# Patient Record
Sex: FEMALE | Race: WHITE | NOT HISPANIC OR LATINO | Employment: UNEMPLOYED | URBAN - METROPOLITAN AREA
[De-identification: names, ages, dates, MRNs, and addresses within clinical notes are randomized per-mention and may not be internally consistent; named-entity substitution may affect disease eponyms.]

---

## 2015-11-02 LAB — CRC RECOMMENDATION EXT: NORMAL

## 2018-10-17 LAB — HUMAN PAPILLOMAVIRUS (HPV): NORMAL

## 2019-10-22 LAB — PAP RECOMMENDATION EXT: NORMAL

## 2021-12-08 LAB — BCS RECOMMENDATION EXT: NORMAL

## 2022-01-24 LAB — HCV IGG SERPL QL IA: NON REACTIVE

## 2022-05-10 LAB
LEFT EYE DM RETINOPATHY: NEGATIVE
RIGHT EYE DM RETINOPATHY: NEGATIVE

## 2022-06-02 LAB
CREATININE URINE: 103 (ref 20–275)
MICROALB/CREAT RATIO: 4
MICROALBUM.,U,RANDOM: 0.4 UG/ML

## 2022-08-01 ENCOUNTER — OFFICE VISIT (OUTPATIENT)
Dept: PRIMARY CARE CLINIC | Facility: CLINIC | Age: 57
End: 2022-08-01
Payer: COMMERCIAL

## 2022-08-01 VITALS
BODY MASS INDEX: 37.6 KG/M2 | TEMPERATURE: 97 F | WEIGHT: 220.25 LBS | HEIGHT: 64 IN | DIASTOLIC BLOOD PRESSURE: 82 MMHG | SYSTOLIC BLOOD PRESSURE: 138 MMHG | HEART RATE: 61 BPM

## 2022-08-01 DIAGNOSIS — I10 HYPERTENSION, UNSPECIFIED TYPE: ICD-10-CM

## 2022-08-01 DIAGNOSIS — Z79.4 TYPE 2 DIABETES MELLITUS WITH OTHER SPECIFIED COMPLICATION, WITH LONG-TERM CURRENT USE OF INSULIN: ICD-10-CM

## 2022-08-01 DIAGNOSIS — M79.7 FIBROMYALGIA: ICD-10-CM

## 2022-08-01 DIAGNOSIS — E78.5 HYPERLIPIDEMIA, UNSPECIFIED HYPERLIPIDEMIA TYPE: ICD-10-CM

## 2022-08-01 DIAGNOSIS — Z00.00 ANNUAL PHYSICAL EXAM: Primary | ICD-10-CM

## 2022-08-01 DIAGNOSIS — Z76.89 ENCOUNTER TO ESTABLISH CARE: ICD-10-CM

## 2022-08-01 DIAGNOSIS — E11.69 TYPE 2 DIABETES MELLITUS WITH OTHER SPECIFIED COMPLICATION, WITH LONG-TERM CURRENT USE OF INSULIN: ICD-10-CM

## 2022-08-01 PROCEDURE — 4010F PR ACE/ARB THEARPY RXD/TAKEN: ICD-10-PCS | Mod: CPTII,S$GLB,, | Performed by: FAMILY MEDICINE

## 2022-08-01 PROCEDURE — 1159F MED LIST DOCD IN RCRD: CPT | Mod: CPTII,S$GLB,, | Performed by: FAMILY MEDICINE

## 2022-08-01 PROCEDURE — 99386 PREV VISIT NEW AGE 40-64: CPT | Mod: S$GLB,,, | Performed by: FAMILY MEDICINE

## 2022-08-01 PROCEDURE — 1159F PR MEDICATION LIST DOCUMENTED IN MEDICAL RECORD: ICD-10-PCS | Mod: CPTII,S$GLB,, | Performed by: FAMILY MEDICINE

## 2022-08-01 PROCEDURE — 3008F PR BODY MASS INDEX (BMI) DOCUMENTED: ICD-10-PCS | Mod: CPTII,S$GLB,, | Performed by: FAMILY MEDICINE

## 2022-08-01 PROCEDURE — 4010F ACE/ARB THERAPY RXD/TAKEN: CPT | Mod: CPTII,S$GLB,, | Performed by: FAMILY MEDICINE

## 2022-08-01 PROCEDURE — 3008F BODY MASS INDEX DOCD: CPT | Mod: CPTII,S$GLB,, | Performed by: FAMILY MEDICINE

## 2022-08-01 PROCEDURE — 3075F PR MOST RECENT SYSTOLIC BLOOD PRESS GE 130-139MM HG: ICD-10-PCS | Mod: CPTII,S$GLB,, | Performed by: FAMILY MEDICINE

## 2022-08-01 PROCEDURE — 1160F RVW MEDS BY RX/DR IN RCRD: CPT | Mod: CPTII,S$GLB,, | Performed by: FAMILY MEDICINE

## 2022-08-01 PROCEDURE — 3079F PR MOST RECENT DIASTOLIC BLOOD PRESSURE 80-89 MM HG: ICD-10-PCS | Mod: CPTII,S$GLB,, | Performed by: FAMILY MEDICINE

## 2022-08-01 PROCEDURE — 99386 PR PREVENTIVE VISIT,NEW,40-64: ICD-10-PCS | Mod: S$GLB,,, | Performed by: FAMILY MEDICINE

## 2022-08-01 PROCEDURE — 3079F DIAST BP 80-89 MM HG: CPT | Mod: CPTII,S$GLB,, | Performed by: FAMILY MEDICINE

## 2022-08-01 PROCEDURE — 1160F PR REVIEW ALL MEDS BY PRESCRIBER/CLIN PHARMACIST DOCUMENTED: ICD-10-PCS | Mod: CPTII,S$GLB,, | Performed by: FAMILY MEDICINE

## 2022-08-01 PROCEDURE — 99999 PR PBB SHADOW E&M-NEW PATIENT-LVL III: CPT | Mod: PBBFAC,,, | Performed by: FAMILY MEDICINE

## 2022-08-01 PROCEDURE — 3075F SYST BP GE 130 - 139MM HG: CPT | Mod: CPTII,S$GLB,, | Performed by: FAMILY MEDICINE

## 2022-08-01 PROCEDURE — 99999 PR PBB SHADOW E&M-NEW PATIENT-LVL III: ICD-10-PCS | Mod: PBBFAC,,, | Performed by: FAMILY MEDICINE

## 2022-08-01 RX ORDER — DULOXETIN HYDROCHLORIDE 30 MG/1
30 CAPSULE, DELAYED RELEASE ORAL DAILY
COMMUNITY
Start: 2022-07-18 | End: 2022-08-01 | Stop reason: SDUPTHER

## 2022-08-01 RX ORDER — AMLODIPINE AND BENAZEPRIL HYDROCHLORIDE 5; 40 MG/1; MG/1
1 CAPSULE ORAL DAILY
COMMUNITY
Start: 2022-06-30 | End: 2022-08-01 | Stop reason: SDUPTHER

## 2022-08-01 RX ORDER — MELOXICAM 15 MG/1
15 TABLET ORAL DAILY
COMMUNITY
Start: 2022-04-19 | End: 2022-08-01 | Stop reason: SDUPTHER

## 2022-08-01 RX ORDER — DULOXETIN HYDROCHLORIDE 30 MG/1
30 CAPSULE, DELAYED RELEASE ORAL DAILY
Qty: 90 CAPSULE | Refills: 3 | Status: SHIPPED | OUTPATIENT
Start: 2022-08-01 | End: 2022-12-08 | Stop reason: SDUPTHER

## 2022-08-01 RX ORDER — NEBIVOLOL 20 MG/1
1 TABLET ORAL DAILY
Qty: 90 TABLET | Refills: 3 | Status: SHIPPED | OUTPATIENT
Start: 2022-08-01 | End: 2022-12-08 | Stop reason: SDUPTHER

## 2022-08-01 RX ORDER — HYDROCHLOROTHIAZIDE 25 MG/1
25 TABLET ORAL DAILY
COMMUNITY
Start: 2022-04-19 | End: 2022-08-01 | Stop reason: SDUPTHER

## 2022-08-01 RX ORDER — METFORMIN HYDROCHLORIDE 500 MG/1
1000 TABLET, EXTENDED RELEASE ORAL 2 TIMES DAILY WITH MEALS
Qty: 360 TABLET | Refills: 3 | Status: SHIPPED | OUTPATIENT
Start: 2022-08-01 | End: 2022-12-08 | Stop reason: SDUPTHER

## 2022-08-01 RX ORDER — HYDROCHLOROTHIAZIDE 25 MG/1
25 TABLET ORAL DAILY
Qty: 90 TABLET | Refills: 3 | Status: SHIPPED | OUTPATIENT
Start: 2022-08-01 | End: 2022-12-08 | Stop reason: SDUPTHER

## 2022-08-01 RX ORDER — AMOXICILLIN 500 MG
2 CAPSULE ORAL
COMMUNITY

## 2022-08-01 RX ORDER — PRAVASTATIN SODIUM 20 MG/1
20 TABLET ORAL DAILY
COMMUNITY
Start: 2022-06-12 | End: 2022-08-01 | Stop reason: SDUPTHER

## 2022-08-01 RX ORDER — PRAVASTATIN SODIUM 20 MG/1
20 TABLET ORAL DAILY
Qty: 90 TABLET | Refills: 3 | Status: SHIPPED | OUTPATIENT
Start: 2022-08-01 | End: 2022-12-08 | Stop reason: SDUPTHER

## 2022-08-01 RX ORDER — CHOLECALCIFEROL (VITAMIN D3) 25 MCG
1000 TABLET ORAL DAILY
COMMUNITY

## 2022-08-01 RX ORDER — ONDANSETRON 4 MG/1
4 TABLET, ORALLY DISINTEGRATING ORAL EVERY 6 HOURS PRN
Qty: 20 TABLET | Refills: 3 | Status: SHIPPED | OUTPATIENT
Start: 2022-08-01 | End: 2022-12-08 | Stop reason: ALTCHOICE

## 2022-08-01 RX ORDER — SEMAGLUTIDE 1.34 MG/ML
INJECTION, SOLUTION SUBCUTANEOUS
Qty: 1 PEN | Refills: 0 | Status: SHIPPED | OUTPATIENT
Start: 2022-08-01 | End: 2022-08-22

## 2022-08-01 RX ORDER — MELOXICAM 15 MG/1
15 TABLET ORAL DAILY
Qty: 90 TABLET | Refills: 3 | Status: SHIPPED | OUTPATIENT
Start: 2022-08-01 | End: 2023-06-12

## 2022-08-01 RX ORDER — NEBIVOLOL 20 MG/1
1 TABLET ORAL DAILY PRN
COMMUNITY
Start: 2022-06-30 | End: 2022-08-01 | Stop reason: SDUPTHER

## 2022-08-01 RX ORDER — INSULIN DEGLUDEC 100 U/ML
INJECTION, SOLUTION SUBCUTANEOUS
COMMUNITY
Start: 2022-07-13 | End: 2022-08-01 | Stop reason: ALTCHOICE

## 2022-08-01 RX ORDER — METFORMIN HYDROCHLORIDE 1000 MG/1
1000 TABLET ORAL 2 TIMES DAILY
COMMUNITY
Start: 2022-05-09 | End: 2022-08-01 | Stop reason: ALTCHOICE

## 2022-08-01 RX ORDER — AMLODIPINE AND BENAZEPRIL HYDROCHLORIDE 5; 40 MG/1; MG/1
1 CAPSULE ORAL DAILY
Qty: 90 CAPSULE | Refills: 3 | Status: SHIPPED | OUTPATIENT
Start: 2022-08-01 | End: 2022-12-08 | Stop reason: SDUPTHER

## 2022-08-01 RX ORDER — INSULIN GLARGINE 100 [IU]/ML
10 INJECTION, SOLUTION SUBCUTANEOUS DAILY
Qty: 9 ML | Refills: 3 | Status: SHIPPED | OUTPATIENT
Start: 2022-08-01 | End: 2022-09-08

## 2022-08-01 RX ORDER — ASPIRIN 81 MG/1
81 TABLET ORAL
COMMUNITY

## 2022-08-01 NOTE — PROGRESS NOTES
Ahsan Tripp MD    [facility]      [address] [place] [address]      Phone: [phone]      Fax: [phone]    Office Visit  08/01/2022    Annual Exam and Establish Care      57-year-old female presents today to [facility], annual exam.    Her , Mr. Milligan, is present with her today.  New line patient states she feels okay today.  Gets tired a lot.  Sweats a lot.  Otherwise, doing okay.  Denies chest pain, shortness breast.  (Recently saw her cardiologist, everything checked out fine.)  States appetite is okay.  Tries to eat healthy.  Bowel movements are normal for her.  She frequently has diarrhea.  No urinary issues.    Has history of diabetes.  Last A1c was 7.2% in June, 2022. Has been struggling to get control of her blood sugar.  States that her sugars typically run in the 150s in the morning.  Previously tried Trulicity, but when she increased the dose to 3 mg weekly, it made her extremely nauseous, throw up, about 2-3 days after doing the injection.  As such, it was discontinued.  She was recently started on Tresiba 20 units daily.  States that since she started on this, her legs have started swelling a bit.  Previously, she used Basaglar and had no issues with that.  She also takes metformin 1 g, b.i.d..  As above, has frequent diarrhea.  Wonders if the metformin could be contributing?  Gets regular eye exams at [facility].    Also has history of hypertension, CAD, HLD.  Currently takes Bystolic 20 mg daily, HCTZ 25 mg daily, amlodipine-benazepril 5-40 mg daily.  Supplements with pravastatin 20 mg daily.  As above, follows with her cardiologist, Dr. Mercedes, regularly.    Additionally, has history of fibromyalgia.  Currently takes Cymbalta 30 mg daily, along with Mobic 15 mg daily.    PMH:  Dm, HTN, CAD, HLD, LAURIE, fibromyalgia  PSH:  Tonsils, BTL  FH:  Dm, HLD, HTN, CAD/mi, AFib  Allergies:  Ceftin causes hives.  Bactrim/sulfa also causes hives.  Social:   Stays at home.  .  Ten ice tea/a/P    Exercise:  No regular exercise program.  Has a stationary bike, but is limited due to knee pains.    Ortho:  Dr. Hood at [facility]  Eyes:  Kat  Cardiology:  Dr. Vipin Mercedes  Gyn:  Dr. Jami Brewer [facility]      Colon: 2015. Repeat in 10 yrs ()    Pap: 10/22/2019. Due in   MM2021    Eyes: 5/10/2022  Foot: 2022    **Labs at [facility]**      Immunizations:    There is no immunization history on file for this patient.    Care Teams:  Patient Care Team:  Ahsan Tripp MD as PCP - General (Family Medicine)    Medical History:  Past Medical History:   Diagnosis Date    Arthritis     Coronary artery disease     Diabetes mellitus, type 2     Fibromyalgia     Hyperlipidemia     Hypertension        Social History:  Social History     Socioeconomic History    Marital status:        Alcohol History:  Social History     Substance and Sexual Activity   Alcohol Use None       Tobacco History:  Social History     Tobacco Use   Smoking Status Not on file   Smokeless Tobacco Not on file       Family History:  Family History   Problem Relation Age of Onset    Diabetes Mother     Hyperlipidemia Mother     Heart attack Mother     Diabetes Father     Hyperlipidemia Father        Surgical History:  Past Surgical History:   Procedure Laterality Date    TONSILLECTOMY      TUBAL LIGATION         Allergies:  Review of patient's allergies indicates:   Allergen Reactions    Cefuroxime Hives     No reaction to Keflex and PCN      Sulfamethoxazole-trimethoprim Hives    Empagliflozin Nausea And Vomiting    Sulfamethoxazole Hives    Trimethoprim Hives       Medications:    Current Outpatient Medications:     aspirin (ECOTRIN) 81 MG EC tablet, Take 81 mg by mouth., Disp: , Rfl:     omega-3 fatty acids/fish oil (FISH OIL-OMEGA-3 FATTY ACIDS) 300-1,000 mg capsule, Take 2 g by mouth., Disp: , Rfl:     vitamin D (VITAMIN D3) 1000 units Tab, Take  "1,000 Units by mouth once daily., Disp: , Rfl:     amLODIPine-benazepriL (LOTREL) 5-40 mg per capsule, Take 1 capsule by mouth once daily., Disp: 90 capsule, Rfl: 3    DULoxetine (CYMBALTA) 30 MG capsule, Take 1 capsule (30 mg total) by mouth once daily., Disp: 90 capsule, Rfl: 3    hydroCHLOROthiazide (HYDRODIURIL) 25 MG tablet, Take 1 tablet (25 mg total) by mouth once daily., Disp: 90 tablet, Rfl: 3    insulin (BASAGLAR KWIKPEN U-100 INSULIN) glargine 100 units/mL SubQ pen, Inject 10 Units into the skin once daily., Disp: 9 mL, Rfl: 3    meloxicam (MOBIC) 15 MG tablet, Take 1 tablet (15 mg total) by mouth once daily., Disp: 90 tablet, Rfl: 3    metFORMIN (GLUCOPHAGE-XR) 500 MG ER 24hr tablet, Take 2 tablets (1,000 mg total) by mouth 2 (two) times daily with meals., Disp: 360 tablet, Rfl: 3    nebivoloL (BYSTOLIC) 20 mg Tab, Take 1 tablet by mouth once daily., Disp: 90 tablet, Rfl: 3    ondansetron (ZOFRAN-ODT) 4 MG TbDL, Take 1 tablet (4 mg total) by mouth every 6 (six) hours as needed (nausea)., Disp: 20 tablet, Rfl: 3    pravastatin (PRAVACHOL) 20 MG tablet, Take 1 tablet (20 mg total) by mouth once daily., Disp: 90 tablet, Rfl: 3    semaglutide (OZEMPIC) 0.25 mg or 0.5 mg(2 mg/1.5 mL) pen injector, Inject 0.25 mg into the skin every 7 days for 28 days, THEN 0.5 mg every 7 days for 14 days., Disp: 1 pen, Rfl: 0    Health Maintenance:  Health Maintenance   Topic Date Due    Hepatitis C Screening  Never done    TETANUS VACCINE  Never done    Mammogram  Never done    Lipid Panel  01/24/2027       Screening Questions  1.  Do you smoke? No  2.  In the past month have you been bothered by feeling "down", depressed or hopeless? No  3.  In the past month, have you experienced a loss of interest or pleasure in doing things? No  4.  In the past 3 months, on any single occasion have you had 5 or more drinks containing alcohol? No  5.  Regarding your use of alcohol, have you ever felt you should cut down on " "your drinking? No  6.  Are you sexually active? Yes  7   Do you exercise?  rarely    Counseling  The patient was counseled regarding diet and exercise, motor vehicle safety, sun exposure, and use of vitamins and supplements.  The patient was counseled regarding Living Will/Durable Power-Of-.  The patient was given information regarding the dangers of smoking and the overuse of alcohol.    Screening Studies    Pap (21-65, if applicable):  approximate date 10/22/2019 and was normal    DM Foot Exam (if applicable): 8/1/2022    DM Eye Exam (if applicable): 5/10/2022    Review of Systems   Constitutional: Negative for activity change, appetite change, chills and fever.   HENT: Negative for congestion, postnasal drip, rhinorrhea, sore throat and trouble swallowing.    Respiratory: Negative for cough, shortness of breath and wheezing.    Cardiovascular: Negative for chest pain and palpitations.   Gastrointestinal: Negative for abdominal pain, constipation, diarrhea, nausea and vomiting.   Genitourinary: Negative for difficulty urinating.   Musculoskeletal: Positive for arthralgias and myalgias.   Skin: Negative for color change and rash.   Neurological: Negative for speech difficulty and headaches.   All other systems reviewed and are negative.       Objective   Vitals:    08/01/22 0941   BP: 138/82   Pulse: 61   Temp: 97.4 °F (36.3 °C)   Weight: 99.9 kg (220 lb 3.8 oz)   Height: 5' 4" (1.626 m)     Physical Exam  Vitals and nursing note reviewed.   Constitutional:       General: She is not in acute distress.     Appearance: Normal appearance.   HENT:      Head: Normocephalic and atraumatic.      Right Ear: Tympanic membrane, ear canal and external ear normal.      Left Ear: Tympanic membrane, ear canal and external ear normal.      Nose: Nose normal. No congestion or rhinorrhea.      Mouth/Throat:      Mouth: Mucous membranes are moist.      Pharynx: Oropharynx is clear. No oropharyngeal exudate or posterior " oropharyngeal erythema.   Eyes:      Extraocular Movements: Extraocular movements intact.      Conjunctiva/sclera: Conjunctivae normal.      Pupils: Pupils are equal, round, and reactive to light.   Cardiovascular:      Rate and Rhythm: Normal rate and regular rhythm.   Pulmonary:      Effort: Pulmonary effort is normal. No respiratory distress.      Breath sounds: No wheezing, rhonchi or rales.   Musculoskeletal:         General: Normal range of motion.      Cervical back: Normal range of motion.   Lymphadenopathy:      Cervical: No cervical adenopathy.   Skin:     General: Skin is warm and dry.      Findings: No rash.   Neurological:      Mental Status: She is alert.     Diabetic foot exam:   Left:   Pulses: Dorsalis Pedis/Posterior Tibial: present  Monofilament test: intact  Visual Inspection normal exam, no swelling, tenderness, instability; ligaments intact, full range of motion of all ankle/foot joints  Right:   Pulses: Dorsalis Pedis/Posterior Tibial: present  Monofilament test: intact  Visual Inspection normal exam, no swelling, tenderness, instability; ligaments intact, full range of motion of all ankle/foot joints         No results found for this or any previous visit (from the past 4368 hour(s)).    Fatou Patel was seen today for annual exam and establish care.    Diagnoses and all orders for this visit:    Annual physical exam  -     CBC Auto Differential; Future  -     Comprehensive Metabolic Panel; Future  -     TSH; Future  -     Lipid Panel; Future  -     Hemoglobin A1C; Future  -     CBC Auto Differential  -     Comprehensive Metabolic Panel  -     TSH  -     Lipid Panel  -     Hemoglobin A1C    Encounter to establish care  -     CBC Auto Differential; Future  -     Comprehensive Metabolic Panel; Future  -     TSH; Future  -     Lipid Panel; Future  -     Hemoglobin A1C; Future  -     CBC Auto Differential  -     Comprehensive Metabolic Panel  -     TSH  -     Lipid Panel  -     Hemoglobin  A1C    Type 2 diabetes mellitus with other specified complication, with long-term current use of insulin  -     insulin (BASAGLAR KWIKPEN U-100 INSULIN) glargine 100 units/mL SubQ pen; Inject 10 Units into the skin once daily.  -     semaglutide (OZEMPIC) 0.25 mg or 0.5 mg(2 mg/1.5 mL) pen injector; Inject 0.25 mg into the skin every 7 days for 28 days, THEN 0.5 mg every 7 days for 14 days.  -     metFORMIN (GLUCOPHAGE-XR) 500 MG ER 24hr tablet; Take 2 tablets (1,000 mg total) by mouth 2 (two) times daily with meals.  -     Hemoglobin A1C; Future  -     Hemoglobin A1C    Hypertension, unspecified type  -     CBC Auto Differential; Future  -     Comprehensive Metabolic Panel; Future  -     TSH; Future  -     Lipid Panel; Future  -     CBC Auto Differential  -     Comprehensive Metabolic Panel  -     TSH  -     Lipid Panel  -     amLODIPine-benazepriL (LOTREL) 5-40 mg per capsule; Take 1 capsule by mouth once daily.  -     hydroCHLOROthiazide (HYDRODIURIL) 25 MG tablet; Take 1 tablet (25 mg total) by mouth once daily.  -     nebivoloL (BYSTOLIC) 20 mg Tab; Take 1 tablet by mouth once daily.    Hyperlipidemia, unspecified hyperlipidemia type  -     Lipid Panel; Future  -     Lipid Panel  -     pravastatin (PRAVACHOL) 20 MG tablet; Take 1 tablet (20 mg total) by mouth once daily.    Fibromyalgia  -     DULoxetine (CYMBALTA) 30 MG capsule; Take 1 capsule (30 mg total) by mouth once daily.  -     meloxicam (MOBIC) 15 MG tablet; Take 1 tablet (15 mg total) by mouth once daily.    Other orders  -     ondansetron (ZOFRAN-ODT) 4 MG TbDL; Take 1 tablet (4 mg total) by mouth every 6 (six) hours as needed (nausea).    Last A1c was 7.2% in June.  DM just slightly out of control.  Since she did better on Basaglar, will change Tresiba to this.  Dropped to 10 units.  Started Ozempic.  We will go slow with titration.  Change metformin to XR.  Recheck A1c in 4 weeks.  Foot exam done today.    Blood pressure appears well controlled.   Continue current meds.    Follow-up:  Follow up in about 5 weeks (around 9/5/2022) for check up.    Signed by:  Ahsan Tripp MD    The patient is accompanied by her , Mr. Milligan.

## 2022-08-01 NOTE — PATIENT INSTRUCTIONS
Physically, everything looks good today.    Let us go ahead and make some changes to meds.    Prescription sent for metformin XR to mail order pharmacy.  When it arrives, just switch it for the immediate release metformin you were taking previously.  Hopefully, this will be much gentler on your stomach.    Let us try starting on Ozempic.  Start with 0.25 mg, once a week, for the 1st 4 weeks.  Then, we can increase to 0.5 mg, once a week.    If you get nauseous, feel free to use Zofran, as needed.    For your insulin, since we are starting on Ozempic, lets drop down to 10 units of insulin each day.  Go ahead and use Mr. Milligan's Basaglar until yours arrives.    Continue to eat a healthy diet.  Be very careful with portion sizes.  Includes lots of fresh fruits, vegetables, whole grains, lean proteins.  See info below.    Keep hydrated.  Be sure to drink at least 8-10, 8 oz, glasses of water every day.    Stay active.  Try to do some sort of physical activity every day.  Nothing outrageous, just try walking for 10-15 minutes each day.     Let us touch base in about 5 weeks to see how you are doing.  KK will schedule both of you an appointment for the same day.  Orders placed to get blood work done at [facility] prior to the visit.

## 2022-08-08 ENCOUNTER — PATIENT MESSAGE (OUTPATIENT)
Dept: ADMINISTRATIVE | Facility: HOSPITAL | Age: 57
End: 2022-08-08
Payer: COMMERCIAL

## 2022-09-07 LAB
ALBUMIN SERPL-MCNC: 4.2 G/DL (ref 3.6–5.1)
ALBUMIN/GLOB SERPL: 1.4 (CALC) (ref 1–2.5)
ALP SERPL-CCNC: 84 U/L (ref 37–153)
ALT SERPL-CCNC: 26 U/L (ref 6–29)
AST SERPL-CCNC: 22 U/L (ref 10–35)
BASOPHILS # BLD AUTO: 54 CELLS/UL (ref 0–200)
BASOPHILS NFR BLD AUTO: 0.6 %
BILIRUB SERPL-MCNC: 0.3 MG/DL (ref 0.2–1.2)
BUN SERPL-MCNC: 17 MG/DL (ref 7–25)
BUN/CREAT SERPL: ABNORMAL (CALC) (ref 6–22)
CALCIUM SERPL-MCNC: 9.1 MG/DL (ref 8.6–10.4)
CHLORIDE SERPL-SCNC: 102 MMOL/L (ref 98–110)
CHOLEST SERPL-MCNC: 141 MG/DL
CHOLEST/HDLC SERPL: 3.4 (CALC)
CO2 SERPL-SCNC: 28 MMOL/L (ref 20–32)
CREAT SERPL-MCNC: 0.71 MG/DL (ref 0.5–1.03)
EGFR: 98 ML/MIN/1.73M2
EOSINOPHIL # BLD AUTO: 207 CELLS/UL (ref 15–500)
EOSINOPHIL NFR BLD AUTO: 2.3 %
ERYTHROCYTE [DISTWIDTH] IN BLOOD BY AUTOMATED COUNT: 12.9 % (ref 11–15)
GLOBULIN SER CALC-MCNC: 3 G/DL (CALC) (ref 1.9–3.7)
GLUCOSE SERPL-MCNC: 209 MG/DL (ref 65–99)
HBA1C MFR BLD: 9 % OF TOTAL HGB
HCT VFR BLD AUTO: 40.9 % (ref 35–45)
HDLC SERPL-MCNC: 42 MG/DL
HGB BLD-MCNC: 13.4 G/DL (ref 11.7–15.5)
LDLC SERPL CALC-MCNC: 77 MG/DL (CALC)
LYMPHOCYTES # BLD AUTO: 3420 CELLS/UL (ref 850–3900)
LYMPHOCYTES NFR BLD AUTO: 38 %
MCH RBC QN AUTO: 27 PG (ref 27–33)
MCHC RBC AUTO-ENTMCNC: 32.8 G/DL (ref 32–36)
MCV RBC AUTO: 82.5 FL (ref 80–100)
MONOCYTES # BLD AUTO: 594 CELLS/UL (ref 200–950)
MONOCYTES NFR BLD AUTO: 6.6 %
NEUTROPHILS # BLD AUTO: 4725 CELLS/UL (ref 1500–7800)
NEUTROPHILS NFR BLD AUTO: 52.5 %
NONHDLC SERPL-MCNC: 99 MG/DL (CALC)
PLATELET # BLD AUTO: 319 THOUSAND/UL (ref 140–400)
PMV BLD REES-ECKER: 10 FL (ref 7.5–12.5)
POTASSIUM SERPL-SCNC: 4.6 MMOL/L (ref 3.5–5.3)
PROT SERPL-MCNC: 7.2 G/DL (ref 6.1–8.1)
RBC # BLD AUTO: 4.96 MILLION/UL (ref 3.8–5.1)
SODIUM SERPL-SCNC: 138 MMOL/L (ref 135–146)
TRIGL SERPL-MCNC: 136 MG/DL
TSH SERPL-ACNC: 3.15 MIU/L (ref 0.4–4.5)
WBC # BLD AUTO: 9 THOUSAND/UL (ref 3.8–10.8)

## 2022-09-08 ENCOUNTER — OFFICE VISIT (OUTPATIENT)
Dept: PRIMARY CARE CLINIC | Facility: CLINIC | Age: 57
End: 2022-09-08
Payer: COMMERCIAL

## 2022-09-08 VITALS
HEART RATE: 78 BPM | HEIGHT: 64 IN | WEIGHT: 219.38 LBS | SYSTOLIC BLOOD PRESSURE: 130 MMHG | DIASTOLIC BLOOD PRESSURE: 82 MMHG | TEMPERATURE: 98 F | BODY MASS INDEX: 37.45 KG/M2

## 2022-09-08 DIAGNOSIS — Z79.4 TYPE 2 DIABETES MELLITUS WITH OTHER SPECIFIED COMPLICATION, WITH LONG-TERM CURRENT USE OF INSULIN: Primary | ICD-10-CM

## 2022-09-08 DIAGNOSIS — E11.69 TYPE 2 DIABETES MELLITUS WITH OTHER SPECIFIED COMPLICATION, WITH LONG-TERM CURRENT USE OF INSULIN: Primary | ICD-10-CM

## 2022-09-08 PROCEDURE — 3008F PR BODY MASS INDEX (BMI) DOCUMENTED: ICD-10-PCS | Mod: CPTII,S$GLB,, | Performed by: FAMILY MEDICINE

## 2022-09-08 PROCEDURE — 3075F SYST BP GE 130 - 139MM HG: CPT | Mod: CPTII,S$GLB,, | Performed by: FAMILY MEDICINE

## 2022-09-08 PROCEDURE — 3075F PR MOST RECENT SYSTOLIC BLOOD PRESS GE 130-139MM HG: ICD-10-PCS | Mod: CPTII,S$GLB,, | Performed by: FAMILY MEDICINE

## 2022-09-08 PROCEDURE — 1159F MED LIST DOCD IN RCRD: CPT | Mod: CPTII,S$GLB,, | Performed by: FAMILY MEDICINE

## 2022-09-08 PROCEDURE — 99999 PR PBB SHADOW E&M-EST. PATIENT-LVL IV: ICD-10-PCS | Mod: PBBFAC,,, | Performed by: FAMILY MEDICINE

## 2022-09-08 PROCEDURE — 1160F PR REVIEW ALL MEDS BY PRESCRIBER/CLIN PHARMACIST DOCUMENTED: ICD-10-PCS | Mod: CPTII,S$GLB,, | Performed by: FAMILY MEDICINE

## 2022-09-08 PROCEDURE — 99214 OFFICE O/P EST MOD 30 MIN: CPT | Mod: S$GLB,,, | Performed by: FAMILY MEDICINE

## 2022-09-08 PROCEDURE — 4010F PR ACE/ARB THEARPY RXD/TAKEN: ICD-10-PCS | Mod: CPTII,S$GLB,, | Performed by: FAMILY MEDICINE

## 2022-09-08 PROCEDURE — 99214 PR OFFICE/OUTPT VISIT, EST, LEVL IV, 30-39 MIN: ICD-10-PCS | Mod: S$GLB,,, | Performed by: FAMILY MEDICINE

## 2022-09-08 PROCEDURE — 3079F DIAST BP 80-89 MM HG: CPT | Mod: CPTII,S$GLB,, | Performed by: FAMILY MEDICINE

## 2022-09-08 PROCEDURE — 3052F HG A1C>EQUAL 8.0%<EQUAL 9.0%: CPT | Mod: CPTII,S$GLB,, | Performed by: FAMILY MEDICINE

## 2022-09-08 PROCEDURE — 3052F PR MOST RECENT HEMOGLOBIN A1C LEVEL 8.0 - < 9.0%: ICD-10-PCS | Mod: CPTII,S$GLB,, | Performed by: FAMILY MEDICINE

## 2022-09-08 PROCEDURE — 4010F ACE/ARB THERAPY RXD/TAKEN: CPT | Mod: CPTII,S$GLB,, | Performed by: FAMILY MEDICINE

## 2022-09-08 PROCEDURE — 3079F PR MOST RECENT DIASTOLIC BLOOD PRESSURE 80-89 MM HG: ICD-10-PCS | Mod: CPTII,S$GLB,, | Performed by: FAMILY MEDICINE

## 2022-09-08 PROCEDURE — 99999 PR PBB SHADOW E&M-EST. PATIENT-LVL IV: CPT | Mod: PBBFAC,,, | Performed by: FAMILY MEDICINE

## 2022-09-08 PROCEDURE — 3008F BODY MASS INDEX DOCD: CPT | Mod: CPTII,S$GLB,, | Performed by: FAMILY MEDICINE

## 2022-09-08 PROCEDURE — 1159F PR MEDICATION LIST DOCUMENTED IN MEDICAL RECORD: ICD-10-PCS | Mod: CPTII,S$GLB,, | Performed by: FAMILY MEDICINE

## 2022-09-08 PROCEDURE — 1160F RVW MEDS BY RX/DR IN RCRD: CPT | Mod: CPTII,S$GLB,, | Performed by: FAMILY MEDICINE

## 2022-09-08 RX ORDER — SEMAGLUTIDE 1.34 MG/ML
0.5 INJECTION, SOLUTION SUBCUTANEOUS
Qty: 1 PEN | Refills: 12 | Status: SHIPPED | OUTPATIENT
Start: 2022-09-08 | End: 2022-12-08 | Stop reason: DRUGHIGH

## 2022-09-08 RX ORDER — INSULIN GLARGINE 100 [IU]/ML
15 INJECTION, SOLUTION SUBCUTANEOUS DAILY
Qty: 9 ML | Refills: 3 | Status: SHIPPED | OUTPATIENT
Start: 2022-09-08 | End: 2022-12-08 | Stop reason: SDUPTHER

## 2022-09-08 RX ORDER — PRAVASTATIN SODIUM 20 MG/1
1 TABLET ORAL DAILY
COMMUNITY
Start: 2022-08-23 | End: 2022-12-08 | Stop reason: SDUPTHER

## 2022-09-08 NOTE — PATIENT INSTRUCTIONS
Recent blood work all looks good.      A1c was elevated at 9%, but we expected this.      Let us increase the Basaglar to 15 units daily.    In about two weeks, start checking her blood sugar each morning.  If it remains above 130, go ahead and increase the Basaglar to 20 units daily.    Since you are doing well on Ozempic, let us increase to 0.5 mg weekly.  New prescription sent to [facility] mail order.    Continue the metformin XR, as prescribed.    Let us touch [facility] in about three months to recheck your A1c and see how you are doing.  We will place an order today.  Feel free to stop by [facility] a couple of days before your appointment to get the blood drawn.    Continue to eat a healthy diet.  Be careful with portion sizes.  Includes lots of fresh fruits, vegetables, whole grains, lean proteins.  See info below.    Keep hydrated.  Be sure to drink at least 8-10, 8 oz, glasses of water every day.    Stay active.  Try to do some sort of physical activity every day.  Nothing outrageous, just try walking for 10-15 minutes each day.

## 2022-09-08 NOTE — PROGRESS NOTES
"    [facility]       [address] [place] [address]      Phone: [phone]      Fax: [phone]    Ahsan Tripp MD                Office Visit  2022        Subjective      HPI:  Jorge Jones is a 57 y.o. female presents today in clinic for "Follow-up  ."     57-year-old female presents today to follow-up on diabetes.    Her , Mr. Milligan, is present with her today.  He provides portions of the history.    Patient states she is doing fine on the Ozempic.  Has been taking 0.25 mg weekly for the last four weeks.  No nausea, vomiting.  Has not really seen an affect on appetite, but is encouraged.  Doing much better than on Trulicity.  Still taking metformin XR.  Diarrhea has improved significantly, although it still is present.  After switching from Tresiba back to Basaglar, the swelling in her has improved.  She feels much better on the Basaglar.  Currently doing 10 units.  States she has tried Jardiance in the past, but it made her extremely nauseous and vomit.    Also has history of hypertension, CAD, HLD.  Currently takes Bystolic 20 mg daily, HCTZ 25 mg daily, amlodipine-benazepril 5-40 mg daily.  Supplements with pravastatin 20 mg daily.  As above, follows with her cardiologist, Dr. Mercedes, regularly.    Additionally, has history of fibromyalgia.  Currently takes Cymbalta 30 mg daily, along with Mobic 15 mg daily.    PMH:  Dm, HTN, CAD, HLD, LAURIE, fibromyalgia  PSH:  Tonsils, BTL  FMH:  Dm, HLD, HTN, CAD/mi, AFib  Allergies:  Ceftin causes hives.  Bactrim/sulfa also causes hives.  Social:  Stays at home.  .  Denies T/a/D    Exercise:  No regular exercise program.  Has a stationary bike, but is limited due to knee pains.    Ortho:  Dr. Hood at [facility]  Eyes:  [place]  Cardiology:  Dr. Vipin Mercedes  Gyn:  Dr. Jami Brewer at [facility]      Colon: 2015. Repeat in 10 yrs ()    Pap: 10/22/2019. Due in   MM2021    Eyes: " 5/10/2022  Foot: 8/1/2022    **L[facility] at [facility]**      The following were updated and reviewed by myself in the chart: medications, past medical history, past surgical history, family history, social history, and allergies.     Medications:  Current Outpatient Medications on File Prior to Visit   Medication Sig Dispense Refill    amLODIPine-benazepriL (LOTREL) 5-40 mg per capsule Take 1 capsule by mouth once daily. 90 capsule 3    aspirin (ECOTRIN) 81 MG EC tablet Take 81 mg by mouth.      DULoxetine (CYMBALTA) 30 MG capsule Take 1 capsule (30 mg total) by mouth once daily. 90 capsule 3    hydroCHLOROthiazide (HYDRODIURIL) 25 MG tablet Take 1 tablet (25 mg total) by mouth once daily. 90 tablet 3    meloxicam (MOBIC) 15 MG tablet Take 1 tablet (15 mg total) by mouth once daily. 90 tablet 3    metFORMIN (GLUCOPHAGE-XR) 500 MG ER 24hr tablet Take 2 tablets (1,000 mg total) by mouth 2 (two) times daily with meals. 360 tablet 3    nebivoloL (BYSTOLIC) 20 mg Tab Take 1 tablet by mouth once daily. 90 tablet 3    omega-3 fatty acids/fish oil (FISH OIL-OMEGA-3 FATTY ACIDS) 300-1,000 mg capsule Take 2 g by mouth.      ondansetron (ZOFRAN-ODT) 4 MG TbDL Take 1 tablet (4 mg total) by mouth every 6 (six) hours as needed (nausea). 20 tablet 3    pravastatin (PRAVACHOL) 20 MG tablet Take 1 tablet (20 mg total) by mouth once daily. 90 tablet 3    vitamin D (VITAMIN D3) 1000 units Tab Take 1,000 Units by mouth once daily.      [DISCONTINUED] insulin (BASAGLAR KWIKPEN U-100 INSULIN) glargine 100 units/mL SubQ pen Inject 10 Units into the skin once daily. 9 mL 3    [DISCONTINUED] semaglutide (OZEMPIC) 0.25 mg or 0.5 mg(2 mg/1.5 mL) pen injector Inject 0.5 mg into the skin every 7 days. 1 pen 12    pravastatin (PRAVACHOL) 20 MG tablet Take 1 tablet by mouth once daily.       No current facility-administered medications on file prior to visit.        PMHx:  Past Medical History:   Diagnosis Date    Arthritis     Coronary artery disease      "Diabetes mellitus, type 2     Fibromyalgia     Hyperlipidemia     Hypertension       There are no problems to display for this patient.       PSHx:  Past Surgical History:   Procedure Laterality Date    TONSILLECTOMY      TUBAL LIGATION          FHx:  Family History   Problem Relation Age of Onset    Diabetes Mother     Hyperlipidemia Mother     Heart attack Mother     Diabetes Father     Hyperlipidemia Father         Social:  Social History     Socioeconomic History    Marital status:         Allergies:  Review of patient's allergies indicates:   Allergen Reactions    Cefuroxime Hives     No reaction to Keflex and PCN      Sulfamethoxazole-trimethoprim Hives    Empagliflozin Nausea And Vomiting    Sulfamethoxazole Hives    Trimethoprim Hives        ROS:  Review of Systems   Constitutional:  Negative for activity change, appetite change, chills and fever.   HENT:  Negative for congestion, postnasal drip, rhinorrhea, sore throat and trouble swallowing.    Respiratory:  Negative for cough, shortness of breath and wheezing.    Cardiovascular:  Negative for chest pain and palpitations.   Gastrointestinal:  Negative for abdominal pain, constipation, diarrhea, nausea and vomiting.   Genitourinary:  Negative for difficulty urinating.   Musculoskeletal:  Negative for arthralgias and myalgias.   Skin:  Negative for color change and rash.   Neurological:  Negative for speech difficulty and headaches.   All other systems reviewed and are negative.       Objective      /82   Pulse 78   Temp 97.9 °F (36.6 °C)   Ht 5' 4" (1.626 m)   Wt 99.5 kg (219 lb 5.7 oz)   BMI 37.65 kg/m²   Ht Readings from Last 3 Encounters:   09/08/22 5' 4" (1.626 m)   08/01/22 5' 4" (1.626 m)     Wt Readings from Last 3 Encounters:   09/08/22 99.5 kg (219 lb 5.7 oz)   08/01/22 99.9 kg (220 lb 3.8 oz)       PHYSICAL EXAM:  Physical Exam  Vitals and nursing note reviewed.   Constitutional:       General: She is not in acute distress.     " Appearance: Normal appearance.   HENT:      Head: Normocephalic and atraumatic.      Right Ear: Tympanic membrane, ear canal and external ear normal.      Left Ear: Tympanic membrane, ear canal and external ear normal.      Nose: Nose normal. No congestion or rhinorrhea.      Mouth/Throat:      Mouth: Mucous membranes are moist.      Pharynx: Oropharynx is clear. No oropharyngeal exudate or posterior oropharyngeal erythema.   Eyes:      Extraocular Movements: Extraocular movements intact.      Conjunctiva/sclera: Conjunctivae normal.      Pupils: Pupils are equal, round, and reactive to light.   Cardiovascular:      Rate and Rhythm: Normal rate and regular rhythm.   Pulmonary:      Effort: Pulmonary effort is normal. No respiratory distress.      Breath sounds: No wheezing, rhonchi or rales.   Musculoskeletal:         General: Normal range of motion.      Cervical back: Normal range of motion.   Lymphadenopathy:      Cervical: No cervical adenopathy.   Skin:     General: Skin is warm and dry.      Findings: No rash.   Neurological:      Mental Status: She is alert.            LABS / IMAGING:  Recent Results (from the past 4368 hour(s))   CBC Auto Differential    Collection Time: 09/06/22  8:32 AM   Result Value Ref Range    WBC 9.0 3.8 - 10.8 Thousand/uL    RBC 4.96 3.80 - 5.10 Million/uL    Hemoglobin 13.4 11.7 - 15.5 g/dL    Hematocrit 40.9 35.0 - 45.0 %    MCV 82.5 80.0 - 100.0 fL    MCH 27.0 27.0 - 33.0 pg    MCHC 32.8 32.0 - 36.0 g/dL    RDW 12.9 11.0 - 15.0 %    Platelets 319 140 - 400 Thousand/uL    MPV 10.0 7.5 - 12.5 fL    Neutrophils, Abs 4,725 1,500 - 7,800 cells/uL    Lymph # 3,420 850 - 3,900 cells/uL    Mono # 594 200 - 950 cells/uL    Eos # 207 15 - 500 cells/uL    Baso # 54 0 - 200 cells/uL    Neutrophils Relative 52.5 %    Lymph % 38.0 %    Mono % 6.6 %    Eosinophil % 2.3 %    Basophil % 0.6 %   Comprehensive Metabolic Panel    Collection Time: 09/06/22  8:32 AM   Result Value Ref Range    Glucose  209 (H) 65 - 99 mg/dL    BUN 17 7 - 25 mg/dL    Creatinine 0.71 0.50 - 1.03 mg/dL    EGFR 98 > OR = 60 mL/min/1.73m2    BUN/Creatinine Ratio NOT APPLICABLE 6 - 22 (calc)    Sodium 138 135 - 146 mmol/L    Potassium 4.6 3.5 - 5.3 mmol/L    Chloride 102 98 - 110 mmol/L    CO2 28 20 - 32 mmol/L    Calcium 9.1 8.6 - 10.4 mg/dL    Total Protein 7.2 6.1 - 8.1 g/dL    Albumin 4.2 3.6 - 5.1 g/dL    Globulin, Total 3.0 1.9 - 3.7 g/dL (calc)    Albumin/Globulin Ratio 1.4 1.0 - 2.5 (calc)    Total Bilirubin 0.3 0.2 - 1.2 mg/dL    Alkaline Phosphatase 84 37 - 153 U/L    AST 22 10 - 35 U/L    ALT 26 6 - 29 U/L   TSH    Collection Time: 09/06/22  8:32 AM   Result Value Ref Range    TSH 3.15 0.40 - 4.50 mIU/L   Lipid Panel    Collection Time: 09/06/22  8:32 AM   Result Value Ref Range    Cholesterol 141 <200 mg/dL    HDL 42 (L) > OR = 50 mg/dL    Triglycerides 136 <150 mg/dL    LDL Cholesterol 77 mg/dL (calc)    HDL/Cholesterol Ratio 3.4 <5.0 (calc)    Non HDL Chol. (LDL+VLDL) 99 <130 mg/dL (calc)   Hemoglobin A1C    Collection Time: 09/06/22  8:32 AM   Result Value Ref Range    Hemoglobin A1C 9.0 (H) <5.7 % of total Hgb         Assessment    1. Type 2 diabetes mellitus with other specified complication, with long-term current use of insulin          Plan    Jorge was seen today for follow-up.    Diagnoses and all orders for this visit:    Type 2 diabetes mellitus with other specified complication, with long-term current use of insulin  -     semaglutide (OZEMPIC) 0.25 mg or 0.5 mg(2 mg/1.5 mL) pen injector; Inject 0.5 mg into the skin every 7 days.  -     insulin (BASAGLAR KWIKPEN U-100 INSULIN) glargine 100 units/mL SubQ pen; Inject 15 Units into the skin once daily.  -     HEMOGLOBIN A1C; Future  -     HEMOGLOBIN A1C    Doing well on Ozempic 0.25 mg.  Will increase to 0.5 mg weekly.      Blood sugar still running over 200 in the mornings.  Will increase Basaglar to 15 units daily.  After two weeks, if a.m. blood sugars are still  above 130, will have her increase to 20 units.    Continue metformin.      Recheck A1c in three months.  Orders for [facility] placed today.      FOLLOW-UP:  Follow up in about 3 months (around 12/8/2022) for check up, med refill.    I spent a total of 35 minutes face to face and non-face to face on the date of this visit.This includes time preparing to see the patient (eg, review of tests, notes), obtaining and/or reviewing additional history from an independent historian and/or outside medical records, documenting clinical information in the electronic health record, independently interpreting results and/or communicating results to the patient/family/caregiver, or care coordinator.    Signed by:  Ahsan Tripp MD

## 2022-10-18 ENCOUNTER — PATIENT MESSAGE (OUTPATIENT)
Dept: ADMINISTRATIVE | Facility: HOSPITAL | Age: 57
End: 2022-10-18
Payer: COMMERCIAL

## 2022-11-14 ENCOUNTER — PATIENT OUTREACH (OUTPATIENT)
Dept: ADMINISTRATIVE | Facility: HOSPITAL | Age: 57
End: 2022-11-14
Payer: COMMERCIAL

## 2022-11-22 ENCOUNTER — PATIENT OUTREACH (OUTPATIENT)
Dept: ADMINISTRATIVE | Facility: HOSPITAL | Age: 57
End: 2022-11-22
Payer: COMMERCIAL

## 2022-12-06 LAB — HBA1C MFR BLD: 6.9 % OF TOTAL HGB

## 2022-12-07 DIAGNOSIS — E11.9 TYPE 2 DIABETES MELLITUS WITHOUT COMPLICATION: ICD-10-CM

## 2022-12-08 ENCOUNTER — OFFICE VISIT (OUTPATIENT)
Dept: PRIMARY CARE CLINIC | Facility: CLINIC | Age: 57
End: 2022-12-08
Payer: COMMERCIAL

## 2022-12-08 VITALS
SYSTOLIC BLOOD PRESSURE: 138 MMHG | HEART RATE: 82 BPM | BODY MASS INDEX: 36.96 KG/M2 | RESPIRATION RATE: 16 BRPM | HEIGHT: 64 IN | DIASTOLIC BLOOD PRESSURE: 76 MMHG | WEIGHT: 216.5 LBS | TEMPERATURE: 98 F | OXYGEN SATURATION: 97 %

## 2022-12-08 DIAGNOSIS — M79.7 FIBROMYALGIA: ICD-10-CM

## 2022-12-08 DIAGNOSIS — E78.5 HYPERLIPIDEMIA, UNSPECIFIED HYPERLIPIDEMIA TYPE: ICD-10-CM

## 2022-12-08 DIAGNOSIS — E11.69 TYPE 2 DIABETES MELLITUS WITH OTHER SPECIFIED COMPLICATION, WITH LONG-TERM CURRENT USE OF INSULIN: Primary | ICD-10-CM

## 2022-12-08 DIAGNOSIS — Z79.4 TYPE 2 DIABETES MELLITUS WITH OTHER SPECIFIED COMPLICATION, WITH LONG-TERM CURRENT USE OF INSULIN: Primary | ICD-10-CM

## 2022-12-08 DIAGNOSIS — I10 HYPERTENSION, UNSPECIFIED TYPE: ICD-10-CM

## 2022-12-08 PROCEDURE — 1160F RVW MEDS BY RX/DR IN RCRD: CPT | Mod: CPTII,S$GLB,, | Performed by: FAMILY MEDICINE

## 2022-12-08 PROCEDURE — 1160F PR REVIEW ALL MEDS BY PRESCRIBER/CLIN PHARMACIST DOCUMENTED: ICD-10-PCS | Mod: CPTII,S$GLB,, | Performed by: FAMILY MEDICINE

## 2022-12-08 PROCEDURE — 3008F PR BODY MASS INDEX (BMI) DOCUMENTED: ICD-10-PCS | Mod: CPTII,S$GLB,, | Performed by: FAMILY MEDICINE

## 2022-12-08 PROCEDURE — 3078F DIAST BP <80 MM HG: CPT | Mod: CPTII,S$GLB,, | Performed by: FAMILY MEDICINE

## 2022-12-08 PROCEDURE — 3044F PR MOST RECENT HEMOGLOBIN A1C LEVEL <7.0%: ICD-10-PCS | Mod: CPTII,S$GLB,, | Performed by: FAMILY MEDICINE

## 2022-12-08 PROCEDURE — 3078F PR MOST RECENT DIASTOLIC BLOOD PRESSURE < 80 MM HG: ICD-10-PCS | Mod: CPTII,S$GLB,, | Performed by: FAMILY MEDICINE

## 2022-12-08 PROCEDURE — 3075F PR MOST RECENT SYSTOLIC BLOOD PRESS GE 130-139MM HG: ICD-10-PCS | Mod: CPTII,S$GLB,, | Performed by: FAMILY MEDICINE

## 2022-12-08 PROCEDURE — 99999 PR PBB SHADOW E&M-EST. PATIENT-LVL IV: CPT | Mod: PBBFAC,,, | Performed by: FAMILY MEDICINE

## 2022-12-08 PROCEDURE — 99214 OFFICE O/P EST MOD 30 MIN: CPT | Mod: S$GLB,,, | Performed by: FAMILY MEDICINE

## 2022-12-08 PROCEDURE — 1159F MED LIST DOCD IN RCRD: CPT | Mod: CPTII,S$GLB,, | Performed by: FAMILY MEDICINE

## 2022-12-08 PROCEDURE — 3075F SYST BP GE 130 - 139MM HG: CPT | Mod: CPTII,S$GLB,, | Performed by: FAMILY MEDICINE

## 2022-12-08 PROCEDURE — 99999 PR PBB SHADOW E&M-EST. PATIENT-LVL IV: ICD-10-PCS | Mod: PBBFAC,,, | Performed by: FAMILY MEDICINE

## 2022-12-08 PROCEDURE — 4010F PR ACE/ARB THEARPY RXD/TAKEN: ICD-10-PCS | Mod: CPTII,S$GLB,, | Performed by: FAMILY MEDICINE

## 2022-12-08 PROCEDURE — 1159F PR MEDICATION LIST DOCUMENTED IN MEDICAL RECORD: ICD-10-PCS | Mod: CPTII,S$GLB,, | Performed by: FAMILY MEDICINE

## 2022-12-08 PROCEDURE — 3008F BODY MASS INDEX DOCD: CPT | Mod: CPTII,S$GLB,, | Performed by: FAMILY MEDICINE

## 2022-12-08 PROCEDURE — 3044F HG A1C LEVEL LT 7.0%: CPT | Mod: CPTII,S$GLB,, | Performed by: FAMILY MEDICINE

## 2022-12-08 PROCEDURE — 4010F ACE/ARB THERAPY RXD/TAKEN: CPT | Mod: CPTII,S$GLB,, | Performed by: FAMILY MEDICINE

## 2022-12-08 PROCEDURE — 99214 PR OFFICE/OUTPT VISIT, EST, LEVL IV, 30-39 MIN: ICD-10-PCS | Mod: S$GLB,,, | Performed by: FAMILY MEDICINE

## 2022-12-08 RX ORDER — INSULIN GLARGINE 100 [IU]/ML
16 INJECTION, SOLUTION SUBCUTANEOUS DAILY
Qty: 15 ML | Refills: 3 | Status: SHIPPED | OUTPATIENT
Start: 2022-12-08 | End: 2023-10-06

## 2022-12-08 RX ORDER — PRAVASTATIN SODIUM 20 MG/1
20 TABLET ORAL DAILY
Qty: 90 TABLET | Refills: 3 | Status: SHIPPED | OUTPATIENT
Start: 2022-12-08 | End: 2022-12-08

## 2022-12-08 RX ORDER — INSULIN GLARGINE 100 [IU]/ML
16 INJECTION, SOLUTION SUBCUTANEOUS DAILY
Qty: 15 ML | Refills: 3 | Status: SHIPPED | OUTPATIENT
Start: 2022-12-08 | End: 2022-12-08

## 2022-12-08 RX ORDER — NEBIVOLOL 20 MG/1
1 TABLET ORAL DAILY
Qty: 90 TABLET | Refills: 3 | Status: SHIPPED | OUTPATIENT
Start: 2022-12-08 | End: 2023-06-12

## 2022-12-08 RX ORDER — AMLODIPINE AND BENAZEPRIL HYDROCHLORIDE 5; 40 MG/1; MG/1
1 CAPSULE ORAL DAILY
Qty: 90 CAPSULE | Refills: 3 | Status: SHIPPED | OUTPATIENT
Start: 2022-12-08 | End: 2023-05-09

## 2022-12-08 RX ORDER — METFORMIN HYDROCHLORIDE 500 MG/1
1000 TABLET, EXTENDED RELEASE ORAL 2 TIMES DAILY WITH MEALS
Qty: 360 TABLET | Refills: 3 | Status: SHIPPED | OUTPATIENT
Start: 2022-12-08 | End: 2022-12-08

## 2022-12-08 RX ORDER — PRAVASTATIN SODIUM 20 MG/1
20 TABLET ORAL DAILY
Qty: 90 TABLET | Refills: 3 | Status: SHIPPED | OUTPATIENT
Start: 2022-12-08 | End: 2024-01-22 | Stop reason: SDUPTHER

## 2022-12-08 RX ORDER — DULOXETIN HYDROCHLORIDE 30 MG/1
30 CAPSULE, DELAYED RELEASE ORAL DAILY
Qty: 90 CAPSULE | Refills: 3 | Status: SHIPPED | OUTPATIENT
Start: 2022-12-08 | End: 2022-12-08

## 2022-12-08 RX ORDER — NEBIVOLOL 20 MG/1
1 TABLET ORAL DAILY
Qty: 90 TABLET | Refills: 3 | Status: SHIPPED | OUTPATIENT
Start: 2022-12-08 | End: 2022-12-08

## 2022-12-08 RX ORDER — AMLODIPINE AND BENAZEPRIL HYDROCHLORIDE 5; 40 MG/1; MG/1
1 CAPSULE ORAL DAILY
Qty: 90 CAPSULE | Refills: 3 | Status: SHIPPED | OUTPATIENT
Start: 2022-12-08 | End: 2022-12-08

## 2022-12-08 RX ORDER — METFORMIN HYDROCHLORIDE 500 MG/1
1000 TABLET, EXTENDED RELEASE ORAL 2 TIMES DAILY WITH MEALS
Qty: 360 TABLET | Refills: 3 | Status: SHIPPED | OUTPATIENT
Start: 2022-12-08 | End: 2023-06-12 | Stop reason: ALTCHOICE

## 2022-12-08 RX ORDER — HYDROCHLOROTHIAZIDE 25 MG/1
25 TABLET ORAL DAILY
Qty: 90 TABLET | Refills: 3 | Status: SHIPPED | OUTPATIENT
Start: 2022-12-08 | End: 2023-06-12

## 2022-12-08 RX ORDER — SEMAGLUTIDE 1.34 MG/ML
1 INJECTION, SOLUTION SUBCUTANEOUS
Qty: 3 PEN | Refills: 3 | Status: SHIPPED | OUTPATIENT
Start: 2022-12-08 | End: 2022-12-08

## 2022-12-08 RX ORDER — HYDROCHLOROTHIAZIDE 25 MG/1
25 TABLET ORAL DAILY
Qty: 90 TABLET | Refills: 3 | Status: SHIPPED | OUTPATIENT
Start: 2022-12-08 | End: 2022-12-08

## 2022-12-08 RX ORDER — DULOXETIN HYDROCHLORIDE 30 MG/1
30 CAPSULE, DELAYED RELEASE ORAL DAILY
Qty: 90 CAPSULE | Refills: 3 | Status: SHIPPED | OUTPATIENT
Start: 2022-12-08 | End: 2024-01-22 | Stop reason: SDUPTHER

## 2022-12-08 RX ORDER — SEMAGLUTIDE 1.34 MG/ML
1 INJECTION, SOLUTION SUBCUTANEOUS
Qty: 3 PEN | Refills: 3 | Status: SHIPPED | OUTPATIENT
Start: 2022-12-08 | End: 2023-04-05 | Stop reason: DRUGHIGH

## 2022-12-08 NOTE — PROGRESS NOTES
"    [facility]       [address] [place] [address]      Phone: [phone]      Fax: [phone]    Ahsan Tripp MD                Office Visit  12/08/2022        Subjective      HPI:  Jorge Jones is a 57 y.o. female presents today in clinic for "Follow-up (3 months, Labs were done on Monday 12/5 at [facility])  ."     57-year-old female presents today to follow-up on diabetes.    Her , Mr. Milligan, is present with her today.  He provides portions of the history.    Patient states she is doing well on the Ozempic.  Has titrated up to 1 mg weekly for several weeks now.  No nausea, vomiting.  Has noticed an affect on appetite.  Eating much less.  Still trying to eat healthy.  Unfortunately, salads caused her diarrhea.  Has not really lost any weight.  Was 220 lb last visit, only 216 lb today.  Still taking metformin XR.  Also uses Basaglar daily.  States she has tried Jardiance in the past, but it made her extremely nauseous and vomit.  A1c has improved from 9.0% to 6.9% today.    Also has hypertension, CAD, HLD.  Currently takes Bystolic 20 mg daily, HCTZ 25 mg daily, amlodipine-benazepril 5-40 mg daily.  Supplements with pravastatin 20 mg daily.  As above, follows with her cardiologist, Dr. Mercedes, regularly.    Additionally, has history of fibromyalgia.  Currently takes Cymbalta 30 mg daily, along with Mobic 15 mg daily.      She states that she has a strong family history of Alzheimer's.  Several family members have it, including 1st degree relatives.  Sometimes she feels like she is forgetting things.  Is concerned that she may develop it at some point.    Has appointment next week to see gyn for MMG, Pap.    PMH:  Dm, HTN, CAD, HLD, LAURIE, fibromyalgia  PSH:  Tonsils, BTL  FMH:  Dm, HLD, HTN, CAD/mi, AFib  Allergies:  Ceftin causes hives.  Bactrim/sulfa also causes hives.  Social:  Stays at home.  .  Denies T/a/D    Exercise:  No regular exercise " program.  Has a stationary bike, but is limited due to knee pains.    Ortho:  Dr. Hood at [facility]  Eyes:  [place]  Cardiology:  Dr. Vipin Mercedes  Gyn:  Dr. Jami Brewer at [facility]      Colon: 2015. Repeat in 10 yrs ()    Pap: 10/22/2019. Due in   MM2021    Eyes: 5/10/2022  Foot: 2022    **Labs at [facility]**      The following were updated and reviewed by myself in the chart: medications, past medical history, past surgical history, family history, social history, and allergies.     Medications:  Current Outpatient Medications on File Prior to Visit   Medication Sig Dispense Refill    aspirin (ECOTRIN) 81 MG EC tablet Take 81 mg by mouth.      meloxicam (MOBIC) 15 MG tablet Take 1 tablet (15 mg total) by mouth once daily. 90 tablet 3    omega-3 fatty acids/fish oil (FISH OIL-OMEGA-3 FATTY ACIDS) 300-1,000 mg capsule Take 2 g by mouth.      vitamin D (VITAMIN D3) 1000 units Tab Take 1,000 Units by mouth once daily.      [DISCONTINUED] amLODIPine-benazepriL (LOTREL) 5-40 mg per capsule Take 1 capsule by mouth once daily. 90 capsule 3    [DISCONTINUED] DULoxetine (CYMBALTA) 30 MG capsule Take 1 capsule (30 mg total) by mouth once daily. 90 capsule 3    [DISCONTINUED] hydroCHLOROthiazide (HYDRODIURIL) 25 MG tablet Take 1 tablet (25 mg total) by mouth once daily. 90 tablet 3    [DISCONTINUED] insulin (BASAGLAR KWIKPEN U-100 INSULIN) glargine 100 units/mL SubQ pen Inject 15 Units into the skin once daily. (Patient taking differently: Inject 16 Units into the skin once daily.) 9 mL 3    [DISCONTINUED] metFORMIN (GLUCOPHAGE-XR) 500 MG ER 24hr tablet Take 2 tablets (1,000 mg total) by mouth 2 (two) times daily with meals. 360 tablet 3    [DISCONTINUED] nebivoloL (BYSTOLIC) 20 mg Tab Take 1 tablet by mouth once daily. 90 tablet 3    [DISCONTINUED] pravastatin (PRAVACHOL) 20 MG tablet Take 1 tablet (20 mg total) by mouth once daily. 90 tablet 3    [DISCONTINUED] semaglutide (OZEMPIC) 0.25 mg or 0.5 mg(2  mg/1.5 mL) pen injector Inject 0.5 mg into the skin every 7 days. 1 pen 12    [DISCONTINUED] ondansetron (ZOFRAN-ODT) 4 MG TbDL Take 1 tablet (4 mg total) by mouth every 6 (six) hours as needed (nausea). (Patient not taking: Reported on 12/8/2022) 20 tablet 3    [DISCONTINUED] pravastatin (PRAVACHOL) 20 MG tablet Take 1 tablet by mouth once daily.       No current facility-administered medications on file prior to visit.        PMHx:  Past Medical History:   Diagnosis Date    Arthritis     Coronary artery disease     Diabetes mellitus, type 2     Fibromyalgia     Hyperlipidemia     Hypertension       There are no problems to display for this patient.       PSHx:  Past Surgical History:   Procedure Laterality Date    TONSILLECTOMY      TUBAL LIGATION          FHx:  Family History   Problem Relation Age of Onset    Diabetes Mother     Hyperlipidemia Mother     Heart attack Mother     Diabetes Father     Hyperlipidemia Father         Social:  Social History     Socioeconomic History    Marital status:    Tobacco Use    Smoking status: Never     Passive exposure: Past    Smokeless tobacco: Never   Substance and Sexual Activity    Alcohol use: Not Currently    Drug use: Never        Allergies:  Review of patient's allergies indicates:   Allergen Reactions    Cefuroxime Hives     No reaction to Keflex and PCN      Sulfamethoxazole-trimethoprim Hives    Cefuroxime axetil Hives    Empagliflozin Nausea And Vomiting    Sulfamethoxazole Hives    Trimethoprim Hives        ROS:  Review of Systems   Constitutional:  Negative for activity change, appetite change, chills and fever.   HENT:  Negative for congestion, postnasal drip, rhinorrhea, sore throat and trouble swallowing.    Respiratory:  Negative for cough, shortness of breath and wheezing.    Cardiovascular:  Negative for chest pain and palpitations.   Gastrointestinal:  Negative for abdominal pain, constipation, diarrhea, nausea and vomiting.   Genitourinary:   "Negative for difficulty urinating.   Musculoskeletal:  Negative for arthralgias and myalgias.   Skin:  Negative for color change and rash.   Neurological:  Negative for speech difficulty and headaches.   All other systems reviewed and are negative.       Objective      /76   Pulse 82   Temp 97.9 °F (36.6 °C)   Resp 16   Ht 5' 4" (1.626 m)   Wt 98.2 kg (216 lb 7.9 oz)   SpO2 97%   BMI 37.16 kg/m²   Ht Readings from Last 3 Encounters:   12/08/22 5' 4" (1.626 m)   09/08/22 5' 4" (1.626 m)   08/01/22 5' 4" (1.626 m)     Wt Readings from Last 3 Encounters:   12/08/22 98.2 kg (216 lb 7.9 oz)   09/08/22 99.5 kg (219 lb 5.7 oz)   08/01/22 99.9 kg (220 lb 3.8 oz)       PHYSICAL EXAM:  Physical Exam  Vitals and nursing note reviewed.   Constitutional:       General: She is not in acute distress.     Appearance: Normal appearance.   HENT:      Head: Normocephalic and atraumatic.      Right Ear: Tympanic membrane, ear canal and external ear normal.      Left Ear: Tympanic membrane, ear canal and external ear normal.      Nose: Nose normal. No congestion or rhinorrhea.      Mouth/Throat:      Mouth: Mucous membranes are moist.      Pharynx: Oropharynx is clear. No oropharyngeal exudate or posterior oropharyngeal erythema.   Eyes:      Extraocular Movements: Extraocular movements intact.      Conjunctiva/sclera: Conjunctivae normal.      Pupils: Pupils are equal, round, and reactive to light.   Cardiovascular:      Rate and Rhythm: Normal rate and regular rhythm.   Pulmonary:      Effort: Pulmonary effort is normal. No respiratory distress.      Breath sounds: No wheezing, rhonchi or rales.   Musculoskeletal:         General: Normal range of motion.      Cervical back: Normal range of motion.   Lymphadenopathy:      Cervical: No cervical adenopathy.   Skin:     General: Skin is warm and dry.      Findings: No rash.   Neurological:      Mental Status: She is alert.            LABS / IMAGING:  Recent Results (from the " past 4368 hour(s))   CBC Auto Differential    Collection Time: 09/06/22  8:32 AM   Result Value Ref Range    WBC 9.0 3.8 - 10.8 Thousand/uL    RBC 4.96 3.80 - 5.10 Million/uL    Hemoglobin 13.4 11.7 - 15.5 g/dL    Hematocrit 40.9 35.0 - 45.0 %    MCV 82.5 80.0 - 100.0 fL    MCH 27.0 27.0 - 33.0 pg    MCHC 32.8 32.0 - 36.0 g/dL    RDW 12.9 11.0 - 15.0 %    Platelets 319 140 - 400 Thousand/uL    MPV 10.0 7.5 - 12.5 fL    Neutrophils, Abs 4,725 1,500 - 7,800 cells/uL    Lymph # 3,420 850 - 3,900 cells/uL    Mono # 594 200 - 950 cells/uL    Eos # 207 15 - 500 cells/uL    Baso # 54 0 - 200 cells/uL    Neutrophils Relative 52.5 %    Lymph % 38.0 %    Mono % 6.6 %    Eosinophil % 2.3 %    Basophil % 0.6 %   Comprehensive Metabolic Panel    Collection Time: 09/06/22  8:32 AM   Result Value Ref Range    Glucose 209 (H) 65 - 99 mg/dL    BUN 17 7 - 25 mg/dL    Creatinine 0.71 0.50 - 1.03 mg/dL    eGFR 98 > OR = 60 mL/min/1.73m2    BUN/Creatinine Ratio NOT APPLICABLE 6 - 22 (calc)    Sodium 138 135 - 146 mmol/L    Potassium 4.6 3.5 - 5.3 mmol/L    Chloride 102 98 - 110 mmol/L    CO2 28 20 - 32 mmol/L    Calcium 9.1 8.6 - 10.4 mg/dL    Total Protein 7.2 6.1 - 8.1 g/dL    Albumin 4.2 3.6 - 5.1 g/dL    Globulin, Total 3.0 1.9 - 3.7 g/dL (calc)    Albumin/Globulin Ratio 1.4 1.0 - 2.5 (calc)    Total Bilirubin 0.3 0.2 - 1.2 mg/dL    Alkaline Phosphatase 84 37 - 153 U/L    AST 22 10 - 35 U/L    ALT 26 6 - 29 U/L   TSH    Collection Time: 09/06/22  8:32 AM   Result Value Ref Range    TSH 3.15 0.40 - 4.50 mIU/L   Lipid Panel    Collection Time: 09/06/22  8:32 AM   Result Value Ref Range    Cholesterol 141 <200 mg/dL    HDL 42 (L) > OR = 50 mg/dL    Triglycerides 136 <150 mg/dL    LDL Cholesterol 77 mg/dL (calc)    HDL/Cholesterol Ratio 3.4 <5.0 (calc)    Non HDL Chol. (LDL+VLDL) 99 <130 mg/dL (calc)   Hemoglobin A1C    Collection Time: 09/06/22  8:32 AM   Result Value Ref Range    Hemoglobin A1C 9.0 (H) <5.7 % of total Hgb   HEMOGLOBIN  A1C    Collection Time: 12/05/22 10:03 AM   Result Value Ref Range    Hemoglobin A1C 6.9 (H) <5.7 % of total Hgb         Assessment    1. Type 2 diabetes mellitus with other specified complication, with long-term current use of insulin    2. Hypertension, unspecified type    3. Hyperlipidemia, unspecified hyperlipidemia type    4. Fibromyalgia          Fatou Patel was seen today for follow-up.    Diagnoses and all orders for this visit:    Type 2 diabetes mellitus with other specified complication, with long-term current use of insulin  -     Discontinue: metFORMIN (GLUCOPHAGE-XR) 500 MG ER 24hr tablet; Take 2 tablets (1,000 mg total) by mouth 2 (two) times daily with meals.  -     Discontinue: insulin (BASAGLAR KWIKPEN U-100 INSULIN) glargine 100 units/mL SubQ pen; Inject 16 Units into the skin once daily.  -     insulin (BASAGLAR KWIKPEN U-100 INSULIN) glargine 100 units/mL SubQ pen; Inject 16 Units into the skin once daily.  -     metFORMIN (GLUCOPHAGE-XR) 500 MG ER 24hr tablet; Take 2 tablets (1,000 mg total) by mouth 2 (two) times daily with meals.  -     semaglutide (OZEMPIC) 1 mg/dose (4 mg/3 mL); Inject 1 mg into the skin every 7 days.    Hypertension, unspecified type  -     Discontinue: nebivoloL (BYSTOLIC) 20 mg Tab; Take 1 tablet by mouth once daily.  -     Discontinue: hydroCHLOROthiazide (HYDRODIURIL) 25 MG tablet; Take 1 tablet (25 mg total) by mouth once daily.  -     Discontinue: amLODIPine-benazepriL (LOTREL) 5-40 mg per capsule; Take 1 capsule by mouth once daily.  -     amLODIPine-benazepriL (LOTREL) 5-40 mg per capsule; Take 1 capsule by mouth once daily.  -     hydroCHLOROthiazide (HYDRODIURIL) 25 MG tablet; Take 1 tablet (25 mg total) by mouth once daily.  -     nebivoloL (BYSTOLIC) 20 mg Tab; Take 1 tablet by mouth once daily.    Hyperlipidemia, unspecified hyperlipidemia type  -     Discontinue: pravastatin (PRAVACHOL) 20 MG tablet; Take 1 tablet (20 mg total) by mouth once daily.  -      pravastatin (PRAVACHOL) 20 MG tablet; Take 1 tablet (20 mg total) by mouth once daily.    Fibromyalgia  -     Discontinue: DULoxetine (CYMBALTA) 30 MG capsule; Take 1 capsule (30 mg total) by mouth once daily.  -     DULoxetine (CYMBALTA) 30 MG capsule; Take 1 capsule (30 mg total) by mouth once daily.    Other orders  -     Discontinue: semaglutide (OZEMPIC) 1 mg/dose (4 mg/3 mL); Inject 1 mg into the skin every 7 days.      Doing very well on Ozempic 1 mg weekly.  We will continue this, metformin, Basaglar.  Refills sent to mail order pharmacy.    Recheck A1c in six months.    Strong family history of Alzheimer's.  Offered referral to Neurology/neuropsychology for baseline cognitive testing.  She will let us know. We will visit this again at next visit.    FOLLOW-UP:  Follow up in about 6 months (around 6/8/2023) for check up.    I spent a total of 35 minutes face to face and non-face to face on the date of this visit.This includes time preparing to see the patient (eg, review of tests, notes), obtaining and/or reviewing additional history from an independent historian and/or outside medical records, documenting clinical information in the electronic health record, independently interpreting results and/or communicating results to the patient/family/caregiver, or care coordinator.    Signed by:  Ahsan Tripp MD

## 2022-12-08 NOTE — PATIENT INSTRUCTIONS
Physically, everything looks great today.      A1c has come down dramatically!  We are now at 6.9%, which is well controlled.      Let us keep taking the Ozempic, along with the other medications, as prescribed.      Refills have been sent to the mail-order pharmacy today.      Continue to eat a healthy diet.  Be careful with portion sizes.  Includes lots of fresh fruits, vegetables, whole grains, lean proteins.  See info below.    Keep hydrated.  Be sure to drink at least 8-10, 8 oz, glasses of water every day.    Stay active.  Try to do some sort of physical activity every day.  Nothing outrageous, just try walking for 10-15 minutes each day.

## 2022-12-09 ENCOUNTER — PATIENT OUTREACH (OUTPATIENT)
Dept: ADMINISTRATIVE | Facility: HOSPITAL | Age: 57
End: 2022-12-09
Payer: COMMERCIAL

## 2022-12-13 ENCOUNTER — PATIENT OUTREACH (OUTPATIENT)
Dept: ADMINISTRATIVE | Facility: HOSPITAL | Age: 57
End: 2022-12-13
Payer: COMMERCIAL

## 2022-12-14 ENCOUNTER — PATIENT OUTREACH (OUTPATIENT)
Dept: ADMINISTRATIVE | Facility: HOSPITAL | Age: 57
End: 2022-12-14
Payer: COMMERCIAL

## 2022-12-14 DIAGNOSIS — E11.9 TYPE 2 DIABETES MELLITUS WITHOUT COMPLICATION: ICD-10-CM

## 2022-12-14 DIAGNOSIS — Z12.31 OTHER SCREENING MAMMOGRAM: ICD-10-CM

## 2022-12-14 NOTE — PROGRESS NOTES
Bulk orders declined-micro albumin urine, as results are in Care Everywhere.    Results abstracted/entered into chart

## 2022-12-16 ENCOUNTER — PATIENT OUTREACH (OUTPATIENT)
Dept: ADMINISTRATIVE | Facility: HOSPITAL | Age: 57
End: 2022-12-16
Payer: COMMERCIAL

## 2022-12-16 NOTE — LETTER
December 16, 2022    Woman's              [facility]  [address]  [facility] [address]  Phone: [phone] December 16, 2022     Patient: Jorge Jones    YOB: 1965   Date of Visit: 12/16/2022     To whom it may concern       We are seeing Jorge ZAPATA CHEVY Jones.O.B is 1965, at [facility]. Ahsan Tripp MD is their primary care physician. To help with our health maintenance records could you please send the following:     Last Pap Smear Result & HPV if applicable.    Fax Pap Smear report or check appropriate below & send back.       ___ Pap overdue    ___ No Pap on file     ___ Patient not on file     Please send fax to [phone], Attention Yasmine GARCIA LPN Care Coordination.    Thank-you in advance for your assistance. If you have any questions or concerns, please don't hesitate to contact me at [phone].    Yasmine GARCIA LPN  Care Coordination Department  [facility]  Phone number [phone]

## 2022-12-16 NOTE — LETTER
December 16, 2022    Woman's              [facility]  [address]  [facility] [address]  Phone: [phone] December 16, 2022     Patient: Jorge Jones    YOB: 1965   Date of Visit: 12/16/2022     To whom it may concern       We are seeing Jorge ZAPATA CHEVY Jones.O.B is 1965, at [facility]. Ahsan Tripp MD is their primary care physician. To help with our health maintenance records could you please send the following:     Last Mammogram Result.    Please send fax to [phone], Attention Yasmine GARCIA LPN Care Coordination.    Thank-you in advance for your assistance. If you have any questions or concerns, please don't hesitate to contact me at [phone].     Yasmine GARCIA LPN  Care Coordination Department  [facility]  Phone number [phone]

## 2022-12-19 ENCOUNTER — PATIENT OUTREACH (OUTPATIENT)
Dept: ADMINISTRATIVE | Facility: HOSPITAL | Age: 57
End: 2022-12-19
Payer: COMMERCIAL

## 2022-12-19 NOTE — LETTER
December 19, 2022    Woman's              [facility]  [address]  [facility] [address]  Phone: [phone] December 19, 2022     Patient: Jorge Jones    YOB: 1965   Date of Visit: 12/19/2022     To whom it may concern       We are seeing Jorge ZAPATA CHEVY Jones.O.B is 1965, at [facility]. Ahsan Tripp MD is their primary care physician. To help with our health maintenance records could you please send the following:     Last Mammogram Result.    Please send fax to [phone], Attention Yasmine GARCIA LPN Care Coordination.    Thank-you in advance for your assistance. If you have any questions or concerns, please don't hesitate to contact me at [phone].     Yasmine GARCIA LPN  Care Coordination Department  [facility]  Phone number [phone]

## 2022-12-20 DIAGNOSIS — Z12.31 OTHER SCREENING MAMMOGRAM: ICD-10-CM

## 2023-01-04 DIAGNOSIS — Z12.31 OTHER SCREENING MAMMOGRAM: ICD-10-CM

## 2023-01-09 LAB
BCS RECOMMENDATION EXT: NORMAL
BMD RECOMMENDATION EXT: NORMAL

## 2023-01-18 LAB — HEMOCCULT STL QL IA: NEGATIVE

## 2023-01-25 ENCOUNTER — PATIENT MESSAGE (OUTPATIENT)
Dept: ADMINISTRATIVE | Facility: HOSPITAL | Age: 58
End: 2023-01-25
Payer: COMMERCIAL

## 2023-03-16 ENCOUNTER — TELEPHONE (OUTPATIENT)
Dept: PRIMARY CARE CLINIC | Facility: CLINIC | Age: 58
End: 2023-03-16
Payer: COMMERCIAL

## 2023-03-16 DIAGNOSIS — M79.7 FIBROMYALGIA: ICD-10-CM

## 2023-03-16 NOTE — TELEPHONE ENCOUNTER
Pt advised Cymbalta was sent to [facility] on 12/8/2022; pharmacy confirmed receipt on same date @10:32am

## 2023-03-16 NOTE — TELEPHONE ENCOUNTER
----- Message from Tamiko Connor sent at 3/16/2023  3:57 PM CDT -----  Contact: Jorge  Type:  RX Refill Request    Who Called: Jogre  Refill or New Rx:refill  RX Name and Strength:DULoxetine (CYMBALTA) 30 MG capsule  How is the patient currently taking it? (ex. 1XDay):1xday  Is this a 30 day or 90 day RX:90day  Preferred Pharmacy with phone number:  [facility] - [facility] - [facility] AT [address] [facility]  [place] [address]  Phone: [phone] Fax: [phone]  Local or Mail Order:mail order  Ordering Provider:  Would the patient rather a call back or a response via [facility]? Call back   Best Call Back Number:[phone]  Additional Information:   Thanks   Am

## 2023-04-05 ENCOUNTER — OFFICE VISIT (OUTPATIENT)
Dept: PRIMARY CARE CLINIC | Facility: CLINIC | Age: 58
End: 2023-04-05
Payer: COMMERCIAL

## 2023-04-05 VITALS
WEIGHT: 220 LBS | TEMPERATURE: 97 F | DIASTOLIC BLOOD PRESSURE: 84 MMHG | SYSTOLIC BLOOD PRESSURE: 122 MMHG | HEART RATE: 86 BPM | BODY MASS INDEX: 37.56 KG/M2 | OXYGEN SATURATION: 94 % | RESPIRATION RATE: 15 BRPM | HEIGHT: 64 IN

## 2023-04-05 DIAGNOSIS — R05.9 COUGH, UNSPECIFIED TYPE: Primary | ICD-10-CM

## 2023-04-05 DIAGNOSIS — J06.9 URI, ACUTE: ICD-10-CM

## 2023-04-05 DIAGNOSIS — E11.69 TYPE 2 DIABETES MELLITUS WITH OTHER SPECIFIED COMPLICATION, WITH LONG-TERM CURRENT USE OF INSULIN: ICD-10-CM

## 2023-04-05 DIAGNOSIS — Z79.4 TYPE 2 DIABETES MELLITUS WITH OTHER SPECIFIED COMPLICATION, WITH LONG-TERM CURRENT USE OF INSULIN: ICD-10-CM

## 2023-04-05 PROCEDURE — 3079F DIAST BP 80-89 MM HG: CPT | Mod: CPTII,S$GLB,, | Performed by: FAMILY MEDICINE

## 2023-04-05 PROCEDURE — 4010F ACE/ARB THERAPY RXD/TAKEN: CPT | Mod: CPTII,S$GLB,, | Performed by: FAMILY MEDICINE

## 2023-04-05 PROCEDURE — 99999 PR PBB SHADOW E&M-EST. PATIENT-LVL V: CPT | Mod: PBBFAC,,, | Performed by: FAMILY MEDICINE

## 2023-04-05 PROCEDURE — 99214 OFFICE O/P EST MOD 30 MIN: CPT | Mod: S$GLB,,, | Performed by: FAMILY MEDICINE

## 2023-04-05 PROCEDURE — 3079F PR MOST RECENT DIASTOLIC BLOOD PRESSURE 80-89 MM HG: ICD-10-PCS | Mod: CPTII,S$GLB,, | Performed by: FAMILY MEDICINE

## 2023-04-05 PROCEDURE — 3008F BODY MASS INDEX DOCD: CPT | Mod: CPTII,S$GLB,, | Performed by: FAMILY MEDICINE

## 2023-04-05 PROCEDURE — 99999 PR PBB SHADOW E&M-EST. PATIENT-LVL V: ICD-10-PCS | Mod: PBBFAC,,, | Performed by: FAMILY MEDICINE

## 2023-04-05 PROCEDURE — 3074F PR MOST RECENT SYSTOLIC BLOOD PRESSURE < 130 MM HG: ICD-10-PCS | Mod: CPTII,S$GLB,, | Performed by: FAMILY MEDICINE

## 2023-04-05 PROCEDURE — 1159F PR MEDICATION LIST DOCUMENTED IN MEDICAL RECORD: ICD-10-PCS | Mod: CPTII,S$GLB,, | Performed by: FAMILY MEDICINE

## 2023-04-05 PROCEDURE — 3074F SYST BP LT 130 MM HG: CPT | Mod: CPTII,S$GLB,, | Performed by: FAMILY MEDICINE

## 2023-04-05 PROCEDURE — 99214 PR OFFICE/OUTPT VISIT, EST, LEVL IV, 30-39 MIN: ICD-10-PCS | Mod: S$GLB,,, | Performed by: FAMILY MEDICINE

## 2023-04-05 PROCEDURE — 1159F MED LIST DOCD IN RCRD: CPT | Mod: CPTII,S$GLB,, | Performed by: FAMILY MEDICINE

## 2023-04-05 PROCEDURE — 3008F PR BODY MASS INDEX (BMI) DOCUMENTED: ICD-10-PCS | Mod: CPTII,S$GLB,, | Performed by: FAMILY MEDICINE

## 2023-04-05 PROCEDURE — 4010F PR ACE/ARB THEARPY RXD/TAKEN: ICD-10-PCS | Mod: CPTII,S$GLB,, | Performed by: FAMILY MEDICINE

## 2023-04-05 RX ORDER — PEN NEEDLE, DIABETIC 31 GX5/16"
1 NEEDLE, DISPOSABLE MISCELLANEOUS
COMMUNITY
Start: 2023-02-06

## 2023-04-05 RX ORDER — PROMETHAZINE HYDROCHLORIDE AND DEXTROMETHORPHAN HYDROBROMIDE 6.25; 15 MG/5ML; MG/5ML
5 SYRUP ORAL EVERY 6 HOURS PRN
Qty: 120 ML | Refills: 0 | Status: SHIPPED | OUTPATIENT
Start: 2023-04-05 | End: 2023-04-15

## 2023-04-05 RX ORDER — ALBUTEROL SULFATE 90 UG/1
2 AEROSOL, METERED RESPIRATORY (INHALATION) EVERY 6 HOURS PRN
Qty: 18 G | Refills: 3 | Status: SHIPPED | OUTPATIENT
Start: 2023-04-05 | End: 2024-01-30

## 2023-04-05 RX ORDER — BENZONATATE 200 MG/1
200 CAPSULE ORAL 3 TIMES DAILY PRN
Qty: 30 CAPSULE | Refills: 0 | Status: SHIPPED | OUTPATIENT
Start: 2023-04-05 | End: 2023-04-15

## 2023-04-05 RX ORDER — SEMAGLUTIDE 2.68 MG/ML
2 INJECTION, SOLUTION SUBCUTANEOUS
Qty: 9 ML | Refills: 3 | Status: SHIPPED | OUTPATIENT
Start: 2023-04-05 | End: 2023-06-12

## 2023-04-05 NOTE — PROGRESS NOTES
"    [facility]       [address] [place] [address]      Phone: [phone]      Fax: [phone]    Ahsan Tripp MD                Office Visit  2023        Subjective      HPI:  Jorge Jones is a 58 y.o. female presents today in clinic for "Cough (Wants to make sure she is good to have heart cath on Monday)  ."     58-year-old female presents today complaining of cough.      Patient states that she is a heart catheterization scheduled for Monday.  Has been coughing lately, wants to make sure she is okay to proceed.      States that she had a nuclear stress test done a couple of weeks ago.  That made her throat start dripping.  She went to the urgent care, got prescriptions for allergist, deacon X, Tessalon, promethazine DM.  Has been taking these.  Reports no fever, chills, nor body aches.  Was congested, but much better now.  No runny nose.  Sinus pressure has relieve itself.  Still has some postnasal drip.  Feels like it is stuck in her upper chest.    PMH:  Dm, HTN, CAD, HLD, LAURIE, fibromyalgia  PSH:  Tonsils, BTL  FMH:  Dm, HLD, HTN, CAD/mi, AFib  Allergies:  Ceftin causes hives.  Bactrim/sulfa also causes hives.  Social:  Stays at home.  .  Denies T/a/D    Exercise:  No regular exercise program.  Has a stationary bike, but is limited due to knee pains.    Ortho:  Dr. Hood at [facility]  Eyes:  [place]  Cardiology:  Dr. Vipin Mercedes  Gyn:  Dr. Jami Brewer at [facility]      Colon: 2015. Repeat in 10 yrs ()    Pap: 10/22/2019. Due in   MM2021    Eyes: 5/10/2022  Foot: 2022    **Labs at [facility]**      The following were updated and reviewed by myself in the chart: medications, past medical history, past surgical history, family history, social history, and allergies.     Medications:  Current Outpatient Medications on File Prior to Visit   Medication Sig Dispense Refill    amLODIPine-benazepriL (LOTREL) 5-40 mg per capsule " "Take 1 capsule by mouth once daily. 90 capsule 3    aspirin (ECOTRIN) 81 MG EC tablet Take 81 mg by mouth.      BD ULTRA-FINE SHORT PEN NEEDLE 31 gauge x 5/16" Ndle Inject 1 Syringe into the skin.      DULoxetine (CYMBALTA) 30 MG capsule Take 1 capsule (30 mg total) by mouth once daily. 90 capsule 3    hydroCHLOROthiazide (HYDRODIURIL) 25 MG tablet Take 1 tablet (25 mg total) by mouth once daily. 90 tablet 3    insulin (BASAGLAR KWIKPEN U-100 INSULIN) glargine 100 units/mL SubQ pen Inject 16 Units into the skin once daily. 15 mL 3    meloxicam (MOBIC) 15 MG tablet Take 1 tablet (15 mg total) by mouth once daily. 90 tablet 3    metFORMIN (GLUCOPHAGE-XR) 500 MG ER 24hr tablet Take 2 tablets (1,000 mg total) by mouth 2 (two) times daily with meals. 360 tablet 3    nebivoloL (BYSTOLIC) 20 mg Tab Take 1 tablet by mouth once daily. (Patient taking differently: Take 1 tablet by mouth 2 (two) times a day.) 90 tablet 3    omega-3 fatty acids/fish oil (FISH OIL-OMEGA-3 FATTY ACIDS) 300-1,000 mg capsule Take 2 g by mouth.      pravastatin (PRAVACHOL) 20 MG tablet Take 1 tablet (20 mg total) by mouth once daily. 90 tablet 3    semaglutide (OZEMPIC) 1 mg/dose (4 mg/3 mL) Inject 1 mg into the skin every 7 days. 3 pen 3    vitamin D (VITAMIN D3) 1000 units Tab Take 1,000 Units by mouth once daily.       No current facility-administered medications on file prior to visit.        PMHx:  Past Medical History:   Diagnosis Date    Arthritis     Coronary artery disease     Diabetes mellitus, type 2     Fibromyalgia     Hyperlipidemia     Hypertension       There are no problems to display for this patient.       PSHx:  Past Surgical History:   Procedure Laterality Date    TONSILLECTOMY      TUBAL LIGATION          FHx:  Family History   Problem Relation Age of Onset    Diabetes Mother     Hyperlipidemia Mother     Heart attack Mother     Diabetes Father     Hyperlipidemia Father         Social:  Social History     Socioeconomic History " "   Marital status:    Tobacco Use    Smoking status: Never     Passive exposure: Past    Smokeless tobacco: Never   Substance and Sexual Activity    Alcohol use: Not Currently    Drug use: Never    Sexual activity: Yes     Partners: Male        Allergies:  Review of patient's allergies indicates:   Allergen Reactions    Cefuroxime Hives     No reaction to Keflex and PCN      Sulfamethoxazole-trimethoprim Hives    Cefuroxime axetil Hives    Empagliflozin Nausea And Vomiting    Sulfamethoxazole Hives    Trimethoprim Hives        ROS:  Review of Systems   Constitutional:  Negative for activity change, appetite change, chills and fever.   HENT:  Positive for postnasal drip. Negative for congestion, rhinorrhea, sore throat and trouble swallowing.    Respiratory:  Positive for cough. Negative for shortness of breath and wheezing.    Cardiovascular:  Negative for chest pain and palpitations.   Gastrointestinal:  Negative for abdominal pain, constipation, diarrhea, nausea and vomiting.   Genitourinary:  Negative for difficulty urinating.   Musculoskeletal:  Negative for arthralgias and myalgias.   Skin:  Negative for color change and rash.   Neurological:  Negative for speech difficulty and headaches.   All other systems reviewed and are negative.       Objective      /84   Pulse 86   Temp 97.1 °F (36.2 °C) (Temporal)   Resp 15   Ht 5' 4" (1.626 m)   Wt 99.8 kg (220 lb 0.3 oz)   SpO2 (!) 94%   BMI 37.77 kg/m²   Ht Readings from Last 3 Encounters:   04/05/23 5' 4" (1.626 m)   12/08/22 5' 4" (1.626 m)   09/08/22 5' 4" (1.626 m)     Wt Readings from Last 3 Encounters:   04/05/23 99.8 kg (220 lb 0.3 oz)   12/08/22 98.2 kg (216 lb 7.9 oz)   09/08/22 99.5 kg (219 lb 5.7 oz)       PHYSICAL EXAM:  Physical Exam  Vitals and nursing note reviewed.   Constitutional:       General: She is not in acute distress.     Appearance: Normal appearance.   HENT:      Head: Normocephalic and atraumatic.      Right Ear: " Tympanic membrane, ear canal and external ear normal.      Left Ear: Tympanic membrane, ear canal and external ear normal.      Nose: Nose normal. No congestion or rhinorrhea.      Mouth/Throat:      Mouth: Mucous membranes are moist.      Pharynx: Oropharynx is clear. No oropharyngeal exudate or posterior oropharyngeal erythema.   Eyes:      Extraocular Movements: Extraocular movements intact.      Conjunctiva/sclera: Conjunctivae normal.      Pupils: Pupils are equal, round, and reactive to light.   Cardiovascular:      Rate and Rhythm: Normal rate and regular rhythm.   Pulmonary:      Effort: Pulmonary effort is normal. No respiratory distress.      Breath sounds: No wheezing, rhonchi or rales.      Comments: Occasional coughing during exam  Musculoskeletal:         General: Normal range of motion.      Cervical back: Normal range of motion.   Lymphadenopathy:      Cervical: No cervical adenopathy.   Skin:     General: Skin is warm and dry.      Findings: No rash.   Neurological:      Mental Status: She is alert.            LABS / IMAGING:  Recent Results (from the past 4368 hour(s))   HEMOGLOBIN A1C    Collection Time: 12/05/22 10:03 AM   Result Value Ref Range    Hemoglobin A1C 6.9 (H) <5.7 % of total Hgb         Assessment    1. Cough, unspecified type    2. URI, acute          Fatou Patel was seen today for cough.    Diagnoses and all orders for this visit:    Cough, unspecified type  -     benzonatate (TESSALON) 200 MG capsule; Take 1 capsule (200 mg total) by mouth 3 (three) times daily as needed for Cough.  -     albuterol (VENTOLIN HFA) 90 mcg/actuation inhaler; Inhale 2 puffs into the lungs every 6 (six) hours as needed (cough). Rescue inhaler.  -     promethazine-dextromethorphan (PROMETHAZINE-DM) 6.25-15 mg/5 mL Syrp; Take 5 mLs by mouth every 6 (six) hours as needed (cough).    URI, acute  -     benzonatate (TESSALON) 200 MG capsule; Take 1 capsule (200 mg total) by mouth 3 (three) times daily as  needed for Cough.  -     albuterol (VENTOLIN HFA) 90 mcg/actuation inhaler; Inhale 2 puffs into the lungs every 6 (six) hours as needed (cough). Rescue inhaler.  -     promethazine-dextromethorphan (PROMETHAZINE-DM) 6.25-15 mg/5 mL Syrp; Take 5 mLs by mouth every 6 (six) hours as needed (cough).    Physically, everything looks good today.  Suspect she just has some postviral cough syndrome, postnasal drip.  Will have her use daily antihistamine.  Refill Tessalon, promethazine DM.  Can try albuterol inhaler for coughing spells.      Patient also requesting to increase her dose of Ozempic to 2 mg weekly.  Tolerating 1 mg weekly just fine.  New dose sent to mail order pharmacy.    FOLLOW-UP:  Follow up if symptoms worsen or fail to improve.    I spent a total of 35 minutes face to face and non-face to face on the date of this visit.This includes time preparing to see the patient (eg, review of tests, notes), obtaining and/or reviewing additional history from an independent historian and/or outside medical records, documenting clinical information in the electronic health record, independently interpreting results and/or communicating results to the patient/family/caregiver, or care coordinator.    Signed by:  Ahsan Tripp MD

## 2023-04-05 NOTE — PATIENT INSTRUCTIONS
Physically, everything looks good today.      Suspect you are just having the lingering affects from your recent URI/cold.      Try using an antihistamine daily (Claritin, Zyrtec, Allegra) for the next two weeks.    Refill of the Tessalon Perles sent to pharmacy.  Feel free to keep using them, as needed, for the cough.      Also sent a refill of the liquid cough medicine.  Continue to use that at bedtime, as needed.      You may want to try using an albuterol inhaler when you have coughing spells.  These can relax the airways and cool it off.    You should be okay to proceed with your heart catheterization on Monday.

## 2023-05-09 ENCOUNTER — OFFICE VISIT (OUTPATIENT)
Dept: PRIMARY CARE CLINIC | Facility: CLINIC | Age: 58
End: 2023-05-09
Payer: COMMERCIAL

## 2023-05-09 VITALS
BODY MASS INDEX: 36.25 KG/M2 | DIASTOLIC BLOOD PRESSURE: 72 MMHG | OXYGEN SATURATION: 98 % | TEMPERATURE: 98 F | RESPIRATION RATE: 14 BRPM | SYSTOLIC BLOOD PRESSURE: 118 MMHG | HEART RATE: 70 BPM | WEIGHT: 212.31 LBS | HEIGHT: 64 IN

## 2023-05-09 DIAGNOSIS — E11.69 TYPE 2 DIABETES MELLITUS WITH OTHER SPECIFIED COMPLICATION, WITH LONG-TERM CURRENT USE OF INSULIN: ICD-10-CM

## 2023-05-09 DIAGNOSIS — E78.5 HYPERLIPIDEMIA, UNSPECIFIED HYPERLIPIDEMIA TYPE: ICD-10-CM

## 2023-05-09 DIAGNOSIS — Z79.4 TYPE 2 DIABETES MELLITUS WITH OTHER SPECIFIED COMPLICATION, WITH LONG-TERM CURRENT USE OF INSULIN: ICD-10-CM

## 2023-05-09 DIAGNOSIS — Z11.4 ENCOUNTER FOR SCREENING FOR HIV: ICD-10-CM

## 2023-05-09 DIAGNOSIS — I25.810 CORONARY ARTERY DISEASE INVOLVING CORONARY BYPASS GRAFT OF NATIVE HEART WITHOUT ANGINA PECTORIS: ICD-10-CM

## 2023-05-09 DIAGNOSIS — Z95.1 S/P CABG X 3: ICD-10-CM

## 2023-05-09 DIAGNOSIS — Z09 HOSPITAL DISCHARGE FOLLOW-UP: Primary | ICD-10-CM

## 2023-05-09 DIAGNOSIS — I10 HYPERTENSION, UNSPECIFIED TYPE: ICD-10-CM

## 2023-05-09 PROCEDURE — 1159F MED LIST DOCD IN RCRD: CPT | Mod: CPTII,S$GLB,, | Performed by: FAMILY MEDICINE

## 2023-05-09 PROCEDURE — 4010F PR ACE/ARB THEARPY RXD/TAKEN: ICD-10-PCS | Mod: CPTII,S$GLB,, | Performed by: FAMILY MEDICINE

## 2023-05-09 PROCEDURE — 99999 PR PBB SHADOW E&M-EST. PATIENT-LVL IV: ICD-10-PCS | Mod: PBBFAC,,, | Performed by: FAMILY MEDICINE

## 2023-05-09 PROCEDURE — 1159F PR MEDICATION LIST DOCUMENTED IN MEDICAL RECORD: ICD-10-PCS | Mod: CPTII,S$GLB,, | Performed by: FAMILY MEDICINE

## 2023-05-09 PROCEDURE — 1160F RVW MEDS BY RX/DR IN RCRD: CPT | Mod: CPTII,S$GLB,, | Performed by: FAMILY MEDICINE

## 2023-05-09 PROCEDURE — 99999 PR PBB SHADOW E&M-EST. PATIENT-LVL IV: CPT | Mod: PBBFAC,,, | Performed by: FAMILY MEDICINE

## 2023-05-09 PROCEDURE — 3008F PR BODY MASS INDEX (BMI) DOCUMENTED: ICD-10-PCS | Mod: CPTII,S$GLB,, | Performed by: FAMILY MEDICINE

## 2023-05-09 PROCEDURE — 4010F ACE/ARB THERAPY RXD/TAKEN: CPT | Mod: CPTII,S$GLB,, | Performed by: FAMILY MEDICINE

## 2023-05-09 PROCEDURE — 3074F SYST BP LT 130 MM HG: CPT | Mod: CPTII,S$GLB,, | Performed by: FAMILY MEDICINE

## 2023-05-09 PROCEDURE — 99215 PR OFFICE/OUTPT VISIT, EST, LEVL V, 40-54 MIN: ICD-10-PCS | Mod: S$GLB,,, | Performed by: FAMILY MEDICINE

## 2023-05-09 PROCEDURE — 3008F BODY MASS INDEX DOCD: CPT | Mod: CPTII,S$GLB,, | Performed by: FAMILY MEDICINE

## 2023-05-09 PROCEDURE — 1160F PR REVIEW ALL MEDS BY PRESCRIBER/CLIN PHARMACIST DOCUMENTED: ICD-10-PCS | Mod: CPTII,S$GLB,, | Performed by: FAMILY MEDICINE

## 2023-05-09 PROCEDURE — 3074F PR MOST RECENT SYSTOLIC BLOOD PRESSURE < 130 MM HG: ICD-10-PCS | Mod: CPTII,S$GLB,, | Performed by: FAMILY MEDICINE

## 2023-05-09 PROCEDURE — 99215 OFFICE O/P EST HI 40 MIN: CPT | Mod: S$GLB,,, | Performed by: FAMILY MEDICINE

## 2023-05-09 PROCEDURE — 3078F PR MOST RECENT DIASTOLIC BLOOD PRESSURE < 80 MM HG: ICD-10-PCS | Mod: CPTII,S$GLB,, | Performed by: FAMILY MEDICINE

## 2023-05-09 PROCEDURE — 3078F DIAST BP <80 MM HG: CPT | Mod: CPTII,S$GLB,, | Performed by: FAMILY MEDICINE

## 2023-05-09 RX ORDER — AMLODIPINE AND BENAZEPRIL HYDROCHLORIDE 5; 20 MG/1; MG/1
CAPSULE ORAL
COMMUNITY
End: 2023-06-12

## 2023-05-09 RX ORDER — POTASSIUM CHLORIDE 20 MEQ/1
20 TABLET, EXTENDED RELEASE ORAL
COMMUNITY
Start: 2023-05-02 | End: 2023-06-12

## 2023-05-09 RX ORDER — PANTOPRAZOLE SODIUM 40 MG/1
40 TABLET, DELAYED RELEASE ORAL
COMMUNITY
Start: 2023-05-02 | End: 2024-01-22 | Stop reason: SDUPTHER

## 2023-05-09 RX ORDER — METOPROLOL TARTRATE 50 MG/1
50 TABLET ORAL 2 TIMES DAILY
COMMUNITY
Start: 2023-05-02 | End: 2023-06-12

## 2023-05-09 RX ORDER — CLOPIDOGREL BISULFATE 75 MG/1
75 TABLET ORAL
COMMUNITY
Start: 2023-05-02 | End: 2024-01-22 | Stop reason: SDUPTHER

## 2023-05-09 RX ORDER — FUROSEMIDE 20 MG/1
20 TABLET ORAL DAILY
COMMUNITY
Start: 2023-05-02 | End: 2024-01-30

## 2023-05-09 NOTE — PATIENT INSTRUCTIONS
All things considered, you are looking pretty good today!     Keep your follow-up appointment with Cardiology, as scheduled.  Would like to get an updated list of your medicines after that visit.      Continue taking the metformin, insulin, as directed.  Let us hold off on Ozempic until your next visit.    Orders for labs placed for [facility].  Feel free to swing by their about one week prior to our next appointment to get the blood drawn.  This way, we can go over the results together.    Continue to eat a healthy diet.  Be careful with portion sizes.  Includes lots of fresh fruits, vegetables, whole grains, lean proteins.  See info below.    Keep hydrated.  Be sure to drink at least 8-10, 8 oz, glasses of water every day.    Stay active.  Try to do some sort of physical activity every day.  Nothing outrageous, just try walking for 10-15 minutes each day.

## 2023-05-09 NOTE — PROGRESS NOTES
"    [facility]       [address] [place] [address]      Phone: [phone]      Fax: [phone]    Ahsan Tripp MD                Office Visit  05/09/2023        Subjective      HPI:  Jorge Jones is a 58 y.o. female presents today in clinic for "No chief complaint on file.  ."     58-year-old female presents today for hospital follow-up.    Her , Mr. Milligan, is present with her today.  He provides portions of the history.    Patient states that she had the heart catheterization scheduled by her cardiologist.  When they did the catheterization, there were three vessels that were completely blocked.  They went ahead and admitted her to the hospital, performed bypass for those three vessels.  Lad was one of the vessels.  She was admitted to [facility] for one week.  From 4/25 through 5/2.  She would have been out a few day sooner, but she developed some discomfort in her left lung.  Apparently, chest x-rays showed some abnormality.  Unsure if it was fluid, infection, pneumothorax.  She states she was told that one log did not completely expand the way it should have.    Since coming home, she feels pretty good.  Still gets short of breath easily when moving around the house.  Has an occasional cough.  No chest pains.  Appetite normal.  Bowel movements normal.  No urinary issues.    For her diabetes, when she was in the hospital they stopped all of her medication and controlled her sugar with insulin.  Since being discharged, she restarted her metformin and her insulin.  She had just filled the prescription for Ozempic 2 mg, but did not take it yet.    Also has hypertension, CAD, HLD.  Was taking Bystolic 20 mg daily, HCTZ 25 mg daily, amlodipine-benazepril 5-40 mg daily, along with pravastatin 20 mg daily.  States that some of her medications were changed while she was in the hospital.  Does not have her with her.  We will be seeing her " "cardiologist next week, on May 15.    Additionally, has history of fibromyalgia.  Currently takes Cymbalta 30 mg daily, along with Mobic 15 mg daily.      PMH:  Dm, HTN, CAD, HLD, LAURIE, fibromyalgia  PSH:  Tonsils, BTL  FMH:  Dm, HLD, HTN, CAD/mi, AFib  Allergies:  Ceftin causes hives.  Bactrim/sulfa also causes hives.  Social:  Stays at home.  .  Denies T/a/D    Exercise:  No regular exercise program.  Has a stationary bike, but is limited due to knee pains.    Ortho:  Dr. Hood at [facility]  Eyes:  Kat  Cardiology:  Dr. Vipin Mercedes  Gyn:  Dr. Jami Brewer at [facility]      Colon: 2015. Repeat in 10 yrs ()    Pap: 10/22/2019. Due in   MM2021    Eyes: 5/10/2022  Foot: 2022    **Labs at [facility]**      The following were updated and reviewed by myself in the chart: medications, past medical history, past surgical history, family history, social history, and allergies.     Medications:  Current Outpatient Medications on File Prior to Visit   Medication Sig Dispense Refill    albuterol (VENTOLIN HFA) 90 mcg/actuation inhaler Inhale 2 puffs into the lungs every 6 (six) hours as needed (cough). Rescue inhaler. 18 g 3    amlodipine-benazepril 5-20 mg (LOTREL) 5-20 mg per capsule Take by mouth.      aspirin (ECOTRIN) 81 MG EC tablet Take 81 mg by mouth.      BD ULTRA-FINE SHORT PEN NEEDLE 31 gauge x 5/16" Ndle Inject 1 Syringe into the skin.      clopidogreL (PLAVIX) 75 mg tablet Take 75 mg by mouth.      DULoxetine (CYMBALTA) 30 MG capsule Take 1 capsule (30 mg total) by mouth once daily. 90 capsule 3    furosemide (LASIX) 20 MG tablet Take 20 mg by mouth.      hydroCHLOROthiazide (HYDRODIURIL) 25 MG tablet Take 1 tablet (25 mg total) by mouth once daily. 90 tablet 3    insulin (BASAGLAR KWIKPEN U-100 INSULIN) glargine 100 units/mL SubQ pen Inject 16 Units into the skin once daily. 15 mL 3    meloxicam (MOBIC) 15 MG tablet Take 1 tablet (15 mg total) by mouth once daily. 90 tablet 3    " metFORMIN (GLUCOPHAGE-XR) 500 MG ER 24hr tablet Take 2 tablets (1,000 mg total) by mouth 2 (two) times daily with meals. 360 tablet 3    metoprolol tartrate (LOPRESSOR) 50 MG tablet Take 50 mg by mouth 2 (two) times daily.      nebivoloL (BYSTOLIC) 20 mg Tab Take 1 tablet by mouth once daily. (Patient taking differently: Take 1 tablet by mouth 2 (two) times a day.) 90 tablet 3    omega-3 fatty acids/fish oil (FISH OIL-OMEGA-3 FATTY ACIDS) 300-1,000 mg capsule Take 2 g by mouth.      pantoprazole (PROTONIX) 40 MG tablet Take 40 mg by mouth.      potassium chloride SA (K-DUR,KLOR-CON) 20 MEQ tablet Take 20 mEq by mouth.      pravastatin (PRAVACHOL) 20 MG tablet Take 1 tablet (20 mg total) by mouth once daily. 90 tablet 3    semaglutide (OZEMPIC) 2 mg/dose (8 mg/3 mL) PnIj Inject 2 mg into the skin every 7 days. 9 mL 3    vitamin D (VITAMIN D3) 1000 units Tab Take 1,000 Units by mouth once daily.      [DISCONTINUED] amLODIPine-benazepriL (LOTREL) 5-40 mg per capsule Take 1 capsule by mouth once daily. 90 capsule 3     No current facility-administered medications on file prior to visit.        PMHx:  Past Medical History:   Diagnosis Date    Arthritis     Coronary artery disease     Diabetes mellitus, type 2     Fibromyalgia     Hyperlipidemia     Hypertension       There are no problems to display for this patient.       PSHx:  Past Surgical History:   Procedure Laterality Date    TONSILLECTOMY      TUBAL LIGATION          FHx:  Family History   Problem Relation Age of Onset    Diabetes Mother     Hyperlipidemia Mother     Heart attack Mother     Diabetes Father     Hyperlipidemia Father         Social:  Social History     Socioeconomic History    Marital status:    Tobacco Use    Smoking status: Never     Passive exposure: Past    Smokeless tobacco: Never   Substance and Sexual Activity    Alcohol use: Not Currently    Drug use: Never    Sexual activity: Yes     Partners: Male        Allergies:  Review of  "patient's allergies indicates:   Allergen Reactions    Cefuroxime Hives     No reaction to Keflex and PCN      Sulfamethoxazole-trimethoprim Hives    Cefuroxime axetil Hives    Empagliflozin Nausea And Vomiting    Sulfamethoxazole Hives    Trimethoprim Hives        ROS:  Review of Systems   Constitutional:  Positive for activity change. Negative for appetite change, chills and fever.   HENT:  Negative for congestion, postnasal drip, rhinorrhea, sore throat and trouble swallowing.    Respiratory:  Positive for cough and shortness of breath. Negative for wheezing.    Cardiovascular:  Negative for chest pain and palpitations.   Gastrointestinal:  Negative for abdominal pain, constipation, diarrhea, nausea and vomiting.   Genitourinary:  Negative for difficulty urinating.   Musculoskeletal:  Negative for arthralgias and myalgias.   Skin:  Negative for color change and rash.   Neurological:  Negative for speech difficulty and headaches.   All other systems reviewed and are negative.       Objective      /72   Pulse 70   Temp 97.9 °F (36.6 °C) (Temporal)   Resp 14   Ht 5' 4" (1.626 m)   Wt 96.3 kg (212 lb 4.9 oz)   SpO2 98%   BMI 36.44 kg/m²   Ht Readings from Last 3 Encounters:   05/09/23 5' 4" (1.626 m)   04/05/23 5' 4" (1.626 m)   12/08/22 5' 4" (1.626 m)     Wt Readings from Last 3 Encounters:   05/09/23 96.3 kg (212 lb 4.9 oz)   04/05/23 99.8 kg (220 lb 0.3 oz)   12/08/22 98.2 kg (216 lb 7.9 oz)       PHYSICAL EXAM:  Physical Exam  Vitals and nursing note reviewed.   Constitutional:       General: She is not in acute distress.     Appearance: Normal appearance.   HENT:      Head: Normocephalic and atraumatic.      Right Ear: Tympanic membrane, ear canal and external ear normal.      Left Ear: Tympanic membrane, ear canal and external ear normal.      Nose: Nose normal. No congestion or rhinorrhea.      Mouth/Throat:      Mouth: Mucous membranes are moist.      Pharynx: Oropharynx is clear. No " oropharyngeal exudate or posterior oropharyngeal erythema.   Eyes:      Extraocular Movements: Extraocular movements intact.      Conjunctiva/sclera: Conjunctivae normal.      Pupils: Pupils are equal, round, and reactive to light.   Cardiovascular:      Rate and Rhythm: Normal rate and regular rhythm.   Pulmonary:      Effort: Pulmonary effort is normal. No respiratory distress.      Breath sounds: No wheezing, rhonchi or rales.   Musculoskeletal:         General: Normal range of motion.      Cervical back: Normal range of motion.   Lymphadenopathy:      Cervical: No cervical adenopathy.   Skin:     General: Skin is warm and dry.      Findings: No rash.   Neurological:      Mental Status: She is alert.            LABS / IMAGING:  Recent Results (from the past 4368 hour(s))   HEMOGLOBIN A1C    Collection Time: 12/05/22 10:03 AM   Result Value Ref Range    Hemoglobin A1C 6.9 (H) <5.7 % of total Hgb         Assessment    1. Hospital discharge follow-up    2. Coronary artery disease involving coronary bypass graft of native heart without angina pectoris    3. S/P CABG x 3    4. Type 2 diabetes mellitus with other specified complication, with long-term current use of insulin    5. Hypertension, unspecified type    6. Hyperlipidemia, unspecified hyperlipidemia type    7. Encounter for screening for HIV          Plan    Diagnoses and all orders for this visit:    Hospital discharge follow-up    Coronary artery disease involving coronary bypass graft of native heart without angina pectoris    S/P CABG x 3    Type 2 diabetes mellitus with other specified complication, with long-term current use of insulin  -     Microalbumin/Creatinine Ratio, Urine; Future  -     HEMOGLOBIN A1C; Future  -     Microalbumin/Creatinine Ratio, Urine  -     HEMOGLOBIN A1C    Hypertension, unspecified type    Hyperlipidemia, unspecified hyperlipidemia type    Encounter for screening for HIV  -     HIV 1/2 Ag/Ab (4th Gen); Future  -     HIV 1/2  Ag/Ab (4th Gen)      Physically, she looks good today, all things considered.    She has follow-up appointments with her cardiologist next week, on May 15.  After that visit, we can contact him to get a list of her updated meds.      Will also follow up with the thoracic surgeon on May 30th.  Because of the lung issue, he wants to get a chest x-ray about two days prior.    She will be due to follow-up with us in about one month.  Will have her keep this appointment.  Will send labs to [facility] to get done about one week prior.    FOLLOW-UP:  Follow up in about 1 month (around 6/9/2023) for check up, labs 1 week prior.    I spent a total of 45 minutes face to face and non-face to face on the date of this visit.This includes time preparing to see the patient (eg, review of tests, notes), obtaining and/or reviewing additional history from an independent historian and/or outside medical records, documenting clinical information in the electronic health record, independently interpreting results and/or communicating results to the patient/family/caregiver, or care coordinator.    Signed by:  Ahsan Tripp MD

## 2023-05-18 ENCOUNTER — OFFICE VISIT (OUTPATIENT)
Dept: PRIMARY CARE CLINIC | Facility: CLINIC | Age: 58
End: 2023-05-18
Payer: COMMERCIAL

## 2023-05-18 VITALS
SYSTOLIC BLOOD PRESSURE: 122 MMHG | BODY MASS INDEX: 36.99 KG/M2 | TEMPERATURE: 98 F | HEART RATE: 88 BPM | WEIGHT: 216.69 LBS | DIASTOLIC BLOOD PRESSURE: 78 MMHG | HEIGHT: 64 IN

## 2023-05-18 DIAGNOSIS — Z95.1 S/P CABG X 3: Primary | ICD-10-CM

## 2023-05-18 DIAGNOSIS — T81.49XA INCISIONAL ABSCESS: ICD-10-CM

## 2023-05-18 PROCEDURE — 3074F PR MOST RECENT SYSTOLIC BLOOD PRESSURE < 130 MM HG: ICD-10-PCS | Mod: CPTII,S$GLB,, | Performed by: FAMILY MEDICINE

## 2023-05-18 PROCEDURE — 3074F SYST BP LT 130 MM HG: CPT | Mod: CPTII,S$GLB,, | Performed by: FAMILY MEDICINE

## 2023-05-18 PROCEDURE — 1159F MED LIST DOCD IN RCRD: CPT | Mod: CPTII,S$GLB,, | Performed by: FAMILY MEDICINE

## 2023-05-18 PROCEDURE — 3008F BODY MASS INDEX DOCD: CPT | Mod: CPTII,S$GLB,, | Performed by: FAMILY MEDICINE

## 2023-05-18 PROCEDURE — 3008F PR BODY MASS INDEX (BMI) DOCUMENTED: ICD-10-PCS | Mod: CPTII,S$GLB,, | Performed by: FAMILY MEDICINE

## 2023-05-18 PROCEDURE — 4010F ACE/ARB THERAPY RXD/TAKEN: CPT | Mod: CPTII,S$GLB,, | Performed by: FAMILY MEDICINE

## 2023-05-18 PROCEDURE — 3078F PR MOST RECENT DIASTOLIC BLOOD PRESSURE < 80 MM HG: ICD-10-PCS | Mod: CPTII,S$GLB,, | Performed by: FAMILY MEDICINE

## 2023-05-18 PROCEDURE — 99214 OFFICE O/P EST MOD 30 MIN: CPT | Mod: S$GLB,,, | Performed by: FAMILY MEDICINE

## 2023-05-18 PROCEDURE — 99999 PR PBB SHADOW E&M-EST. PATIENT-LVL V: CPT | Mod: PBBFAC,,, | Performed by: FAMILY MEDICINE

## 2023-05-18 PROCEDURE — 4010F PR ACE/ARB THEARPY RXD/TAKEN: ICD-10-PCS | Mod: CPTII,S$GLB,, | Performed by: FAMILY MEDICINE

## 2023-05-18 PROCEDURE — 99999 PR PBB SHADOW E&M-EST. PATIENT-LVL V: ICD-10-PCS | Mod: PBBFAC,,, | Performed by: FAMILY MEDICINE

## 2023-05-18 PROCEDURE — 1159F PR MEDICATION LIST DOCUMENTED IN MEDICAL RECORD: ICD-10-PCS | Mod: CPTII,S$GLB,, | Performed by: FAMILY MEDICINE

## 2023-05-18 PROCEDURE — 3078F DIAST BP <80 MM HG: CPT | Mod: CPTII,S$GLB,, | Performed by: FAMILY MEDICINE

## 2023-05-18 PROCEDURE — 87186 SC STD MICRODIL/AGAR DIL: CPT | Performed by: FAMILY MEDICINE

## 2023-05-18 PROCEDURE — 87077 CULTURE AEROBIC IDENTIFY: CPT | Performed by: FAMILY MEDICINE

## 2023-05-18 PROCEDURE — 99214 PR OFFICE/OUTPT VISIT, EST, LEVL IV, 30-39 MIN: ICD-10-PCS | Mod: S$GLB,,, | Performed by: FAMILY MEDICINE

## 2023-05-18 PROCEDURE — 87070 CULTURE OTHR SPECIMN AEROBIC: CPT | Performed by: FAMILY MEDICINE

## 2023-05-18 RX ORDER — DOXYCYCLINE HYCLATE 100 MG
100 TABLET ORAL 2 TIMES DAILY
Qty: 20 TABLET | Refills: 0 | Status: SHIPPED | OUTPATIENT
Start: 2023-05-18 | End: 2023-05-28

## 2023-05-18 RX ORDER — METOPROLOL SUCCINATE 100 MG/1
100 TABLET, EXTENDED RELEASE ORAL
COMMUNITY
Start: 2023-05-15 | End: 2024-01-22 | Stop reason: SDUPTHER

## 2023-05-18 RX ORDER — DOXYCYCLINE HYCLATE 100 MG
100 TABLET ORAL 2 TIMES DAILY
Qty: 20 TABLET | Refills: 0 | Status: SHIPPED | OUTPATIENT
Start: 2023-05-18 | End: 2023-05-18

## 2023-05-18 NOTE — PATIENT INSTRUCTIONS
Does look like the top of the wound is getting a little abscess.      Culture obtained today.  If/when it grows the bacteria, will make sure it is susceptible to the doxycycline.  If not, we will contact you in change the antibiotic.      For now, take doxycycline twice daily, as directed.      Apply a warm compress to the area for about 5 minutes, twice a day.    After the compress, clean the area gently with antibacterial soap.  Rinse thoroughly, pat dry.      For the next three days, apply a small amount of Bactroban (mupirocin) ointment to the area.  This will help kill bacteria from the outside.  After about three days, stop using the ointment and let the area dry out.      Keep your follow-up appointment with the surgeon on May 31.

## 2023-05-18 NOTE — PROGRESS NOTES
"    [facility]       [address] [place] [address]      Phone: [phone]      Fax: [phone]    Ahsan Tripp MD                Office Visit  2023        Subjective      HPI:  Jorge Jones is a 58 y.o. female presents today in clinic for "Wound Check  ."     58-year-old female presents today for follow-up of her incision.    Her , Mr. Milligan, is present with her today.  He provides portions of the history.    She had CABG done on approximately .  Healing had been going well, but over the last couple of days the top of the sternal incision started turning red.  Mild swelling.  No discomfort.  Slight leakage.  A couple of the other incisions have also had some leakage.  She wanted to get it checked out to make sure she was not getting an infection.    PMH:  Dm, HTN, CAD, HLD, LAURIE, fibromyalgia  PSH:  Tonsils, BTL  FMH:  Dm, HLD, HTN, CAD/mi, AFib  Allergies:  Ceftin causes hives.  Bactrim/sulfa also causes hives.  Social:  Stays at home.  .  Denies T/a/D    Exercise:  No regular exercise program.  Has a stationary bike, but is limited due to knee pains.    Ortho:  Dr. Hood at [facility]  Eyes:  [place]  Cardiology:  Dr. Vipin Mercedes  Gyn:  Dr. Jami Brewer at [facility]      Colon: 2015. Repeat in 10 yrs ()    Pap: 10/22/2019. Due in   MM2021    Eyes: 5/10/2022  Foot: 2022    **Labs at [facility]**      The following were updated and reviewed by myself in the chart: medications, past medical history, past surgical history, family history, social history, and allergies.     Medications:  Current Outpatient Medications on File Prior to Visit   Medication Sig Dispense Refill    albuterol (VENTOLIN HFA) 90 mcg/actuation inhaler Inhale 2 puffs into the lungs every 6 (six) hours as needed (cough). Rescue inhaler. 18 g 3    aspirin (ECOTRIN) 81 MG EC tablet Take 81 mg by mouth.      BD ULTRA-FINE SHORT PEN NEEDLE 31 gauge x " "5/16" Ndle Inject 1 Syringe into the skin.      clopidogreL (PLAVIX) 75 mg tablet Take 75 mg by mouth.      DULoxetine (CYMBALTA) 30 MG capsule Take 1 capsule (30 mg total) by mouth once daily. 90 capsule 3    insulin (BASAGLAR KWIKPEN U-100 INSULIN) glargine 100 units/mL SubQ pen Inject 16 Units into the skin once daily. 15 mL 3    metFORMIN (GLUCOPHAGE-XR) 500 MG ER 24hr tablet Take 2 tablets (1,000 mg total) by mouth 2 (two) times daily with meals. 360 tablet 3    metoprolol succinate (TOPROL-XL) 100 MG 24 hr tablet Take 100 mg by mouth.      metoprolol tartrate (LOPRESSOR) 50 MG tablet Take 50 mg by mouth 2 (two) times daily.      omega-3 fatty acids/fish oil (FISH OIL-OMEGA-3 FATTY ACIDS) 300-1,000 mg capsule Take 2 g by mouth.      pantoprazole (PROTONIX) 40 MG tablet Take 40 mg by mouth.      pravastatin (PRAVACHOL) 20 MG tablet Take 1 tablet (20 mg total) by mouth once daily. 90 tablet 3    vitamin D (VITAMIN D3) 1000 units Tab Take 1,000 Units by mouth once daily.      amlodipine-benazepril 5-20 mg (LOTREL) 5-20 mg per capsule Take by mouth.      furosemide (LASIX) 20 MG tablet Take 20 mg by mouth.      hydroCHLOROthiazide (HYDRODIURIL) 25 MG tablet Take 1 tablet (25 mg total) by mouth once daily. (Patient not taking: Reported on 5/18/2023) 90 tablet 3    meloxicam (MOBIC) 15 MG tablet Take 1 tablet (15 mg total) by mouth once daily. (Patient not taking: Reported on 5/18/2023) 90 tablet 3    nebivoloL (BYSTOLIC) 20 mg Tab Take 1 tablet by mouth once daily. (Patient not taking: Reported on 5/18/2023) 90 tablet 3    potassium chloride SA (K-DUR,KLOR-CON) 20 MEQ tablet Take 20 mEq by mouth.      semaglutide (OZEMPIC) 2 mg/dose (8 mg/3 mL) PnIj Inject 2 mg into the skin every 7 days. (Patient not taking: Reported on 5/18/2023) 9 mL 3     No current facility-administered medications on file prior to visit.        PMHx:  Past Medical History:   Diagnosis Date    Arthritis     Coronary artery disease     Diabetes " "mellitus, type 2     Fibromyalgia     Hyperlipidemia     Hypertension       There are no problems to display for this patient.       PSHx:  Past Surgical History:   Procedure Laterality Date    TONSILLECTOMY      TUBAL LIGATION          FHx:  Family History   Problem Relation Age of Onset    Diabetes Mother     Hyperlipidemia Mother     Heart attack Mother     Diabetes Father     Hyperlipidemia Father         Social:  Social History     Socioeconomic History    Marital status:    Tobacco Use    Smoking status: Never     Passive exposure: Past    Smokeless tobacco: Never   Substance and Sexual Activity    Alcohol use: Not Currently    Drug use: Never    Sexual activity: Yes     Partners: Male        Allergies:  Review of patient's allergies indicates:   Allergen Reactions    Cefuroxime Hives     No reaction to Keflex and PCN      Sulfamethoxazole-trimethoprim Hives    Cefuroxime axetil Hives    Empagliflozin Nausea And Vomiting    Sulfamethoxazole Hives    Trimethoprim Hives        ROS:  Review of Systems   Constitutional:  Negative for activity change, appetite change, chills and fever.   HENT:  Negative for congestion, postnasal drip, rhinorrhea, sore throat and trouble swallowing.    Respiratory:  Negative for cough, shortness of breath and wheezing.    Cardiovascular:  Negative for chest pain and palpitations.   Gastrointestinal:  Negative for abdominal pain, constipation, diarrhea, nausea and vomiting.   Genitourinary:  Negative for difficulty urinating.   Musculoskeletal:  Negative for arthralgias and myalgias.   Skin:  Positive for color change and wound.   Neurological:  Negative for speech difficulty and headaches.   All other systems reviewed and are negative.       Objective      /78   Pulse 88   Temp 97.9 °F (36.6 °C)   Ht 5' 4" (1.626 m)   Wt 98.3 kg (216 lb 11.4 oz)   BMI 37.20 kg/m²   Ht Readings from Last 3 Encounters:   05/18/23 5' 4" (1.626 m)   05/09/23 5' 4" (1.626 m)   04/05/23 " "5' 4" (1.626 m)     Wt Readings from Last 3 Encounters:   05/18/23 98.3 kg (216 lb 11.4 oz)   05/09/23 96.3 kg (212 lb 4.9 oz)   04/05/23 99.8 kg (220 lb 0.3 oz)       PHYSICAL EXAM:  Physical Exam  Vitals and nursing note reviewed.   Constitutional:       General: She is not in acute distress.     Appearance: Normal appearance.   HENT:      Head: Normocephalic and atraumatic.      Right Ear: Tympanic membrane, ear canal and external ear normal.      Left Ear: Tympanic membrane, ear canal and external ear normal.      Nose: Nose normal. No congestion or rhinorrhea.      Mouth/Throat:      Mouth: Mucous membranes are moist.      Pharynx: Oropharynx is clear. No oropharyngeal exudate or posterior oropharyngeal erythema.   Eyes:      Extraocular Movements: Extraocular movements intact.      Conjunctiva/sclera: Conjunctivae normal.      Pupils: Pupils are equal, round, and reactive to light.   Cardiovascular:      Rate and Rhythm: Normal rate and regular rhythm.   Pulmonary:      Effort: Pulmonary effort is normal. No respiratory distress.      Breath sounds: No wheezing, rhonchi or rales.   Chest:          Comments: Midline, sternal incision on chest wall.  Staples in place.  Most of the incision looks good.  The apex has a small area of swelling, approximately 1-2 cm.  Erythematous.  Warm to touch.  Slight scab.  Manual pressure elicits small amount of purulent material.  Culture obtained.  Musculoskeletal:         General: Normal range of motion.      Cervical back: Normal range of motion.   Lymphadenopathy:      Cervical: No cervical adenopathy.   Skin:     General: Skin is warm and dry.      Findings: No rash.   Neurological:      Mental Status: She is alert.            LABS / IMAGING:  Recent Results (from the past 4368 hour(s))   HEMOGLOBIN A1C    Collection Time: 12/05/22 10:03 AM   Result Value Ref Range    Hemoglobin A1C 6.9 (H) <5.7 % of total Hgb         Assessment    1. S/P CABG x 3    2. Incisional abscess "          Fatou Patel was seen today for wound check.    Diagnoses and all orders for this visit:    S/P CABG x 3  -     doxycycline (VIBRA-TABS) 100 MG tablet; Take 1 tablet (100 mg total) by mouth 2 (two) times daily. for 10 days  -     Aerobic culture    Incisional abscess  -     doxycycline (VIBRA-TABS) 100 MG tablet; Take 1 tablet (100 mg total) by mouth 2 (two) times daily. for 10 days  -     Aerobic culture      Does look like we are developing a small, incisional abscess.  Will start her on doxycycline twice daily for 10 days.  Warm compress.  Clean gently with antibacterial soap.  Rinse, pat dry.  Bactroban/mupirocin for three days.  Keep follow-up appointment with surgeon, as scheduled.    Culture obtained in clinic.    FOLLOW-UP:  Follow up if symptoms worsen or fail to improve.    I spent a total of 35 minutes face to face and non-face to face on the date of this visit.This includes time preparing to see the patient (eg, review of tests, notes), obtaining and/or reviewing additional history from an independent historian and/or outside medical records, documenting clinical information in the electronic health record, independently interpreting results and/or communicating results to the patient/family/caregiver, or care coordinator.    Signed by:  Ahsan Tripp MD

## 2023-05-21 LAB — BACTERIA SPEC AEROBE CULT: ABNORMAL

## 2023-05-22 NOTE — PROGRESS NOTES
Ms. Patel,     As expected, the wound abscess did grow staph bacteria.  The good news is that it should be sensitive to the doxycycline (tetracycline) that I sent to the pharmacy.

## 2023-06-02 ENCOUNTER — TELEPHONE (OUTPATIENT)
Dept: PRIMARY CARE CLINIC | Facility: CLINIC | Age: 58
End: 2023-06-02
Payer: COMMERCIAL

## 2023-06-02 NOTE — TELEPHONE ENCOUNTER
----- Message from Lindy Gallegos sent at 6/2/2023  9:21 AM CDT -----  Who Called: Pt    What is the request in detail: Requesting call back to discuss new Rx for cpap face mask sent to [facility]. Fax: [phone].  Please advise    Can the clinic reply by [facility]? No    Best Call Back Number: [phone]        Additional Information:

## 2023-06-02 NOTE — TELEPHONE ENCOUNTER
Pt advised to come in to sign Records request form; records needed to be requested from previous sleep medicine clinic.    Pt came into clinic today; PHI request faxed    Pt also advised Dr Tripp did send you lab orders to [facility]

## 2023-06-08 ENCOUNTER — TELEPHONE (OUTPATIENT)
Dept: PRIMARY CARE CLINIC | Facility: CLINIC | Age: 58
End: 2023-06-08
Payer: COMMERCIAL

## 2023-06-08 NOTE — TELEPHONE ENCOUNTER
----- Message from Ashli Gardiner sent at 6/8/2023 11:26 AM CDT -----  Contact: Georgiana/[facility]  Type:  Sooner Apoointment Request    Caller is requesting a sooner appointment. Caller will not accept being placed on the waitlist and is requesting a message be sent to doctor.  Name of Caller:Georgiana  When is the first available appointment?scheduled 6/20/23  Symptoms: Hospital follow-up  Would the patient rather a call back or a response via [facility]? call  Best Call Back Number:[phone]  Additional Information: Request patient is seen sooner than scheduled.  Thank you,  GH

## 2023-06-12 ENCOUNTER — OFFICE VISIT (OUTPATIENT)
Dept: PRIMARY CARE CLINIC | Facility: CLINIC | Age: 58
End: 2023-06-12
Payer: COMMERCIAL

## 2023-06-12 DIAGNOSIS — Z79.4 TYPE 2 DIABETES MELLITUS WITH OTHER SPECIFIED COMPLICATION, WITH LONG-TERM CURRENT USE OF INSULIN: ICD-10-CM

## 2023-06-12 DIAGNOSIS — E11.69 TYPE 2 DIABETES MELLITUS WITH OTHER SPECIFIED COMPLICATION, WITH LONG-TERM CURRENT USE OF INSULIN: ICD-10-CM

## 2023-06-12 DIAGNOSIS — Z09 HOSPITAL DISCHARGE FOLLOW-UP: Primary | ICD-10-CM

## 2023-06-12 DIAGNOSIS — I50.9 ACUTE ON CHRONIC CONGESTIVE HEART FAILURE, UNSPECIFIED HEART FAILURE TYPE: ICD-10-CM

## 2023-06-12 PROCEDURE — 1160F PR REVIEW ALL MEDS BY PRESCRIBER/CLIN PHARMACIST DOCUMENTED: ICD-10-PCS | Mod: CPTII,95,, | Performed by: FAMILY MEDICINE

## 2023-06-12 PROCEDURE — 4010F ACE/ARB THERAPY RXD/TAKEN: CPT | Mod: CPTII,95,, | Performed by: FAMILY MEDICINE

## 2023-06-12 PROCEDURE — 1159F MED LIST DOCD IN RCRD: CPT | Mod: CPTII,95,, | Performed by: FAMILY MEDICINE

## 2023-06-12 PROCEDURE — 99214 PR OFFICE/OUTPT VISIT, EST, LEVL IV, 30-39 MIN: ICD-10-PCS | Mod: 95,,, | Performed by: FAMILY MEDICINE

## 2023-06-12 PROCEDURE — 1160F RVW MEDS BY RX/DR IN RCRD: CPT | Mod: CPTII,95,, | Performed by: FAMILY MEDICINE

## 2023-06-12 PROCEDURE — 4010F PR ACE/ARB THEARPY RXD/TAKEN: ICD-10-PCS | Mod: CPTII,95,, | Performed by: FAMILY MEDICINE

## 2023-06-12 PROCEDURE — 99214 OFFICE O/P EST MOD 30 MIN: CPT | Mod: 95,,, | Performed by: FAMILY MEDICINE

## 2023-06-12 PROCEDURE — 1159F PR MEDICATION LIST DOCUMENTED IN MEDICAL RECORD: ICD-10-PCS | Mod: CPTII,95,, | Performed by: FAMILY MEDICINE

## 2023-06-12 RX ORDER — EMPAGLIFLOZIN, METFORMIN HYDROCHLORIDE 12.5; 1 MG/1; MG/1
2 TABLET, EXTENDED RELEASE ORAL DAILY
Qty: 180 TABLET | Refills: 3 | Status: SHIPPED | OUTPATIENT
Start: 2023-06-12 | End: 2023-08-10 | Stop reason: ALTCHOICE

## 2023-06-12 NOTE — PROGRESS NOTES
"    [facility]       [address] [place] [address]      Phone: [phone]      Fax: [phone]    Ahsan Tripp MD                Video Visit  06/12/2023        Subjective      HPI:  Jorge Jones is a 58 y.o. female presents today via video for "No chief complaint on file.  ."     58-year-old female presents today via video visit for hospital follow-up.    Pain it is that she was in the hospital last week.  Went to [facility].    Patient states that she had been feeling a bit tired, sluggish.  One afternoon they went out to eat.  Had some blood crabs.  Even before she got to the restaurant, was already feeling a bit exhausted.  She only ate a little blood, then started feeling bad.  Got home, was getting short of breath.  Oxygen level had dropped to 89%.  She called the on-call physician who told her to good you MARINA Bennett evaluated.      She went to [facility].  They did labs, x-ray, CT.  They thought at 1st it might be pneumonia, but determined it to be CHF exacerbation.  She was admitted, given some diuretics.  Once her breathing improved, they discharged her home.      While she was in the hospital, she noticed her blood sugar was in the 400s.  They were given her insulin, but blood sugar has stayed high at home.  Recently, she was taking some extended-release metformin, but knows that whole pills were appearing in her stools.  She had some leftover immediate release metformin, shoes to she started taking this instead.  Sugars are still in the 2-300 range.  She increased her insulin to 30 units daily.      She saw her cardiologist this morning.  He recommended adding Jardiance or Farxiga to her medications to help with both blood sugar and her heart.  He plans on keeping her Lasix temporarily.  She will go back to  in about a week.  Knee also noted that her incision looked infected again.  Was able to schedule her an appointment with her cardiothoracic " surgeon on Wednesday, two days from now.  He noted that while she was in the hospital, her white blood cell counts were slightly elevated, so was her temperature.    PMH:  Dm, HTN, CAD, HLD, LAURIE, fibromyalgia  PSH:  Tonsils, BTL  FMH:  Dm, HLD, HTN, CAD/mi, AFib  Allergies:  Ceftin causes hives.  Bactrim/sulfa also causes hives.  Social:  Stays at home.  .  Denies T/a/D    Exercise:  No regular exercise program.  Has a stationary bike, but is limited due to knee pains.    Ortho:  Dr. Hood at [facility]  Eyes:  Kat  Cardiology:  Dr. Vipin Mercedes  Gyn:  Dr. Jami Brewer at [facility]      Colon: 2015. Repeat in 10 yrs ()    Pap: 10/22/2019. Due in   MM2021    Eyes: 5/10/2022  Foot: 2022    **Labs at [facility]**    Diabetes  She has type 2 diabetes mellitus. MedicAlert identification noted. The initial diagnosis of diabetes was made 15 years ago. Hypoglycemia symptoms include hunger, nervousness/anxiousness and sleepiness. Pertinent negatives for hypoglycemia include no confusion, dizziness, headaches, mood changes, pallor, seizures, speech difficulty, sweats or tremors. Associated symptoms include fatigue, polydipsia, polyphagia and polyuria. Pertinent negatives for diabetes include no blurred vision, no chest pain, no foot paresthesias, no foot ulcerations, no visual change, no weakness and no weight loss. Pertinent negatives for hypoglycemia complications include no blackouts, no hospitalization, no nocturnal hypoglycemia, no required assistance and no required glucagon injection. Symptoms are improving. Diabetic complications include heart disease. Pertinent negatives for diabetic complications include no autonomic neuropathy, CVA, nephropathy, peripheral neuropathy, PVD or retinopathy. Risk factors for coronary artery disease include dyslipidemia, family history, hypertension, obesity, post-menopausal, sedentary lifestyle and diabetes mellitus. Current diabetic treatment includes diet,  "insulin injections and oral agent (monotherapy). She is compliant with treatment most of the time. She is currently taking insulin at bedtime. Insulin injections are given by patient. Rotation sites for injection include the abdominal wall. She is following a generally healthy diet. When asked about meal planning, she reported none. She has had a previous visit with a dietitian. She rarely participates in exercise. She monitors blood glucose at home 1-2 x per day. She monitors urine at home <1 x per month. Blood glucose monitoring compliance is inadequate. Her home blood glucose trend is increasing steadily. She does not see a podiatrist.Eye exam is not current.     The following were updated and reviewed by myself in the chart: medications, past medical history, past surgical history, family history, social history, and allergies.     Medications:  Current Outpatient Medications on File Prior to Visit   Medication Sig Dispense Refill    albuterol (VENTOLIN HFA) 90 mcg/actuation inhaler Inhale 2 puffs into the lungs every 6 (six) hours as needed (cough). Rescue inhaler. 18 g 3    aspirin (ECOTRIN) 81 MG EC tablet Take 81 mg by mouth.      BD ULTRA-FINE SHORT PEN NEEDLE 31 gauge x 5/16" Ndle Inject 1 Syringe into the skin.      clopidogreL (PLAVIX) 75 mg tablet Take 75 mg by mouth.      DULoxetine (CYMBALTA) 30 MG capsule Take 1 capsule (30 mg total) by mouth once daily. 90 capsule 3    furosemide (LASIX) 20 MG tablet Take 20 mg by mouth once daily.      insulin (BASAGLAR KWIKPEN U-100 INSULIN) glargine 100 units/mL SubQ pen Inject 16 Units into the skin once daily. 15 mL 3    metoprolol succinate (TOPROL-XL) 100 MG 24 hr tablet Take 100 mg by mouth.      omega-3 fatty acids/fish oil (FISH OIL-OMEGA-3 FATTY ACIDS) 300-1,000 mg capsule Take 2 g by mouth.      pantoprazole (PROTONIX) 40 MG tablet Take 40 mg by mouth.      pravastatin (PRAVACHOL) 20 MG tablet Take 1 tablet (20 mg total) by mouth once daily. 90 tablet 3 "    vitamin D (VITAMIN D3) 1000 units Tab Take 1,000 Units by mouth once daily.      [DISCONTINUED] amlodipine-benazepril 5-20 mg (LOTREL) 5-20 mg per capsule Take by mouth.      [DISCONTINUED] hydroCHLOROthiazide (HYDRODIURIL) 25 MG tablet Take 1 tablet (25 mg total) by mouth once daily. (Patient not taking: Reported on 5/18/2023) 90 tablet 3    [DISCONTINUED] meloxicam (MOBIC) 15 MG tablet Take 1 tablet (15 mg total) by mouth once daily. (Patient not taking: Reported on 5/18/2023) 90 tablet 3    [DISCONTINUED] metFORMIN (GLUCOPHAGE-XR) 500 MG ER 24hr tablet Take 2 tablets (1,000 mg total) by mouth 2 (two) times daily with meals. 360 tablet 3    [DISCONTINUED] metoprolol tartrate (LOPRESSOR) 50 MG tablet Take 50 mg by mouth 2 (two) times daily.      [DISCONTINUED] nebivoloL (BYSTOLIC) 20 mg Tab Take 1 tablet by mouth once daily. (Patient not taking: Reported on 5/18/2023) 90 tablet 3    [DISCONTINUED] potassium chloride SA (K-DUR,KLOR-CON) 20 MEQ tablet Take 20 mEq by mouth.      [DISCONTINUED] semaglutide (OZEMPIC) 2 mg/dose (8 mg/3 mL) PnIj Inject 2 mg into the skin every 7 days. (Patient not taking: Reported on 5/18/2023) 9 mL 3     No current facility-administered medications on file prior to visit.        PMHx:  Past Medical History:   Diagnosis Date    Arthritis     Coronary artery disease     Diabetes mellitus, type 2     Fibromyalgia     Hyperlipidemia     Hypertension       There are no problems to display for this patient.       PSHx:  Past Surgical History:   Procedure Laterality Date    TONSILLECTOMY      TUBAL LIGATION          FHx:  Family History   Problem Relation Age of Onset    Diabetes Mother     Hyperlipidemia Mother     Heart attack Mother     Diabetes Father     Hyperlipidemia Father         Social:  Social History     Socioeconomic History    Marital status:    Tobacco Use    Smoking status: Never     Passive exposure: Past    Smokeless tobacco: Never   Substance and Sexual Activity     "Alcohol use: Not Currently    Drug use: Never    Sexual activity: Yes     Partners: Male        Allergies:  Review of patient's allergies indicates:   Allergen Reactions    Cefuroxime Hives     No reaction to Keflex and PCN      Sulfamethoxazole-trimethoprim Hives    Cefuroxime axetil Hives    Empagliflozin Nausea And Vomiting    Sulfamethoxazole Hives    Trimethoprim Hives        ROS:  Review of Systems   Constitutional:  Positive for fatigue. Negative for activity change, appetite change, chills, fever and weight loss.   HENT:  Negative for congestion, postnasal drip, rhinorrhea, sore throat and trouble swallowing.    Eyes:  Negative for blurred vision.   Respiratory:  Negative for cough, shortness of breath and wheezing.    Cardiovascular:  Negative for chest pain and palpitations.   Gastrointestinal:  Negative for abdominal pain, constipation, diarrhea, nausea and vomiting.   Endocrine: Positive for polydipsia, polyphagia and polyuria.   Genitourinary:  Negative for difficulty urinating.   Musculoskeletal:  Negative for arthralgias and myalgias.   Skin:  Positive for color change. Negative for pallor and rash.   Neurological:  Negative for dizziness, tremors, seizures, speech difficulty, weakness and headaches.   Psychiatric/Behavioral:  Negative for confusion. The patient is nervous/anxious.    All other systems reviewed and are negative.       Objective      There were no vitals taken for this visit.  Ht Readings from Last 3 Encounters:   05/18/23 5' 4" (1.626 m)   05/09/23 5' 4" (1.626 m)   04/05/23 5' 4" (1.626 m)     Wt Readings from Last 3 Encounters:   05/18/23 98.3 kg (216 lb 11.4 oz)   05/09/23 96.3 kg (212 lb 4.9 oz)   04/05/23 99.8 kg (220 lb 0.3 oz)       PHYSICAL EXAM:  Physical Exam  Constitutional:       General: She is not in acute distress.     Appearance: Normal appearance.   HENT:      Head: Normocephalic and atraumatic.   Pulmonary:      Effort: Pulmonary effort is normal. No respiratory " distress.   Skin:     Findings: Erythema and wound present.             Comments: The surgical wound appears to be erythematous on video.   Neurological:      Mental Status: She is alert.            LABS / IMAGING:  Recent Results (from the past 4368 hour(s))   Aerobic culture    Collection Time: 05/18/23 10:36 AM    Specimen: Abscess; Incision site   Result Value Ref Range    Aerobic Bacterial Culture (A)      METHICILLIN RESISTANT STAPHYLOCOCCUS AUREUS  Many         Susceptibility    Methicillin resistant Staphylococcus aureus - CULTURE, AEROBIC  (SPECIFY SOURCE)     Clindamycin <=0.5 Sensitive mcg/mL     Erythromycin <=0.5 Sensitive mcg/mL     Oxacillin >2 Resistant mcg/mL     Penicillin 8 Resistant mcg/mL     Trimeth/Sulfa <=0.5/9.5 Sensitive mcg/mL     Tetracycline <=4 Sensitive mcg/mL     Vancomycin 2 Sensitive mcg/mL         Assessment    1. Hospital discharge follow-up    2. Type 2 diabetes mellitus with other specified complication, with long-term current use of insulin    3. Acute on chronic congestive heart failure, unspecified heart failure type          Plan    Diagnoses and all orders for this visit:    Hospital discharge follow-up    Type 2 diabetes mellitus with other specified complication, with long-term current use of insulin  -     empagliflozin-metformin (SYNJARDY XR) 12.5-1,000 mg TBph; Take 2 tablets by mouth once daily.    Acute on chronic congestive heart failure, unspecified heart failure type  -     empagliflozin-metformin (SYNJARDY XR) 12.5-1,000 mg TBph; Take 2 tablets by mouth once daily.      Physically, she looks like she is doing okay on video.  We with blood sugars continuing to be elevated, will have her stay on the 30 units of insulin.  Will add Synjardy to her medications.  (Stop the metformin)    She has some doxycycline left over from the last time she had but appear to be a skin infection.  Will have her start taking that in keep the appointment with surgeon on  Wednesday.    FOLLOW-UP:  Follow up if symptoms worsen or fail to improve.    The patient location is: [place]  The chief complaint leading to consultation is: hosp f/u     Visit type: audiovisual    Face to Face time with patient: 20 minutes    35 minutes of total time spent on the encounter, which includes face to face time and non-face to face time preparing to see the patient (eg, review of tests), Obtaining and/or reviewing separately obtained history, Documenting clinical information in the electronic or other health record, Independently interpreting results (not separately reported) and communicating results to the patient/family/caregiver, or Care coordination (not separately reported).     Each patient to whom he or she provides medical services by telemedicine is:  (1) informed of the relationship between the physician and patient and the respective role of any other health care provider with respect to management of the patient; and (2) notified that he or she may decline to receive medical services by telemedicine and may withdraw from such care at any time.    Signed by:  Ahsan Tripp MD

## 2023-06-13 ENCOUNTER — TELEPHONE (OUTPATIENT)
Dept: PRIMARY CARE CLINIC | Facility: CLINIC | Age: 58
End: 2023-06-13
Payer: COMMERCIAL

## 2023-06-13 NOTE — TELEPHONE ENCOUNTER
----- Message from Fernanda Sawyer sent at 6/12/2023  2:59 PM CDT -----  Contact: [phone]  Pt is calling in regards to her virtual appt. Pt stated that her computer does not have a camera and she would like a phone call instead. Please call her back at [phone]. Thanks KB

## 2023-06-22 ENCOUNTER — OFFICE VISIT (OUTPATIENT)
Dept: PRIMARY CARE CLINIC | Facility: CLINIC | Age: 58
End: 2023-06-22
Payer: COMMERCIAL

## 2023-06-22 VITALS
TEMPERATURE: 98 F | HEIGHT: 64 IN | WEIGHT: 204.13 LBS | HEART RATE: 78 BPM | SYSTOLIC BLOOD PRESSURE: 120 MMHG | DIASTOLIC BLOOD PRESSURE: 82 MMHG | BODY MASS INDEX: 34.85 KG/M2

## 2023-06-22 DIAGNOSIS — E11.69 TYPE 2 DIABETES MELLITUS WITH OTHER SPECIFIED COMPLICATION, WITH LONG-TERM CURRENT USE OF INSULIN: ICD-10-CM

## 2023-06-22 DIAGNOSIS — T81.49XA INCISIONAL ABSCESS: ICD-10-CM

## 2023-06-22 DIAGNOSIS — Z09 HOSPITAL DISCHARGE FOLLOW-UP: Primary | ICD-10-CM

## 2023-06-22 DIAGNOSIS — Z79.4 TYPE 2 DIABETES MELLITUS WITH OTHER SPECIFIED COMPLICATION, WITH LONG-TERM CURRENT USE OF INSULIN: ICD-10-CM

## 2023-06-22 DIAGNOSIS — Z95.1 S/P CABG X 3: ICD-10-CM

## 2023-06-22 PROCEDURE — 99999 PR PBB SHADOW E&M-EST. PATIENT-LVL V: CPT | Mod: PBBFAC,,, | Performed by: FAMILY MEDICINE

## 2023-06-22 PROCEDURE — 3074F PR MOST RECENT SYSTOLIC BLOOD PRESSURE < 130 MM HG: ICD-10-PCS | Mod: CPTII,S$GLB,, | Performed by: FAMILY MEDICINE

## 2023-06-22 PROCEDURE — 4010F PR ACE/ARB THEARPY RXD/TAKEN: ICD-10-PCS | Mod: CPTII,S$GLB,, | Performed by: FAMILY MEDICINE

## 2023-06-22 PROCEDURE — 4010F ACE/ARB THERAPY RXD/TAKEN: CPT | Mod: CPTII,S$GLB,, | Performed by: FAMILY MEDICINE

## 2023-06-22 PROCEDURE — 99999 PR PBB SHADOW E&M-EST. PATIENT-LVL V: ICD-10-PCS | Mod: PBBFAC,,, | Performed by: FAMILY MEDICINE

## 2023-06-22 PROCEDURE — 99215 OFFICE O/P EST HI 40 MIN: CPT | Mod: S$GLB,,, | Performed by: FAMILY MEDICINE

## 2023-06-22 PROCEDURE — 3079F PR MOST RECENT DIASTOLIC BLOOD PRESSURE 80-89 MM HG: ICD-10-PCS | Mod: CPTII,S$GLB,, | Performed by: FAMILY MEDICINE

## 2023-06-22 PROCEDURE — 1159F MED LIST DOCD IN RCRD: CPT | Mod: CPTII,S$GLB,, | Performed by: FAMILY MEDICINE

## 2023-06-22 PROCEDURE — 3008F PR BODY MASS INDEX (BMI) DOCUMENTED: ICD-10-PCS | Mod: CPTII,S$GLB,, | Performed by: FAMILY MEDICINE

## 2023-06-22 PROCEDURE — 3079F DIAST BP 80-89 MM HG: CPT | Mod: CPTII,S$GLB,, | Performed by: FAMILY MEDICINE

## 2023-06-22 PROCEDURE — 99215 PR OFFICE/OUTPT VISIT, EST, LEVL V, 40-54 MIN: ICD-10-PCS | Mod: S$GLB,,, | Performed by: FAMILY MEDICINE

## 2023-06-22 PROCEDURE — 3008F BODY MASS INDEX DOCD: CPT | Mod: CPTII,S$GLB,, | Performed by: FAMILY MEDICINE

## 2023-06-22 PROCEDURE — 3074F SYST BP LT 130 MM HG: CPT | Mod: CPTII,S$GLB,, | Performed by: FAMILY MEDICINE

## 2023-06-22 PROCEDURE — 1159F PR MEDICATION LIST DOCUMENTED IN MEDICAL RECORD: ICD-10-PCS | Mod: CPTII,S$GLB,, | Performed by: FAMILY MEDICINE

## 2023-06-22 NOTE — PROGRESS NOTES
"    [facility]       [address] [place] [address]      Phone: [phone]      Fax: [phone]    Ahsan Tripp MD                Office Visit  06/22/2023        Subjective      HPI:  Jorge Jones is a 58 y.o. female presents today in clinic for "Follow-up  ."     58-year-old female presents today for hospital follow-up.    Her daughter, Javier, is present with her today.  She provides portions of the history.      We last saw Ms. Jones on 06/12/2023 via video visit for another hospital follow-up.  At that visit, she noted that her incision from her surgery was looking a little red, swollen.  When she pressed on it, a little bit of pus would come out.  She had some leftover doxycycline, so we recommended she start taking it.  She had a follow-up appointment with her surgeon scheduled for two days later.    She took the doxycycline, as directed that evening.  The next day, when she woke up, she states that the incision got significantly worse overnight.  She sent a picture of the wound to her surgeon's office and he directed her to go directly to the ER.  She went to [facility].  Surgeon met her there and wound up taking her back to the operating room to do a procedure to debride the wound.  She was kept in the hospital for approximally two days (six/06/13/2015).  Wound VAC was placed.  She was discharged home with home health.  Was given a prescription for Levaquin, which she just finished.  She states she is had some x-rays that show the sternum is healing.  She reports that the surgeon's plan is to let this wound heal, then when the sternum itself has completely healed, they will go back in to remove the plates and wires holding everything together.  They worry that there might be infection in the bone.    Since coming home, she is been feeling relatively okay.  She did have an episode of vomiting on Sunday.  At the time, she felt shaky, nauseous.      She " "has been taking her Lantus 30 units daily, as directed.  Was taking two tablets of Synjardy each morning.  She thinks this may have contributed to her stomach upset.  States that in the hospital, her A1c was checked and it came back at 10%.  Since coming home, her sugars have been running very good.  Numbers have been between the 120s-160s in the morning, fasting.    She notes that some right-sided chest wall discomfort and right shoulder discomfort that she had been having seems to have improved since he debrided the wound.    She also still has a strange sensation in her lower/middle back, on the left side.  Feels almost a fluttery sensation when she takes a deep breath, just before she coughs.  Comes and goes.    PMH:  Dm, HTN, CAD, HLD, LAURIE, fibromyalgia  PSH:  Tonsils, BTL  FMH:  Dm, HLD, HTN, CAD/mi, AFib  Allergies:  Ceftin causes hives.  Bactrim/sulfa also causes hives.  Social:  Stays at home.  .  Denies T/a/D    Exercise:  No regular exercise program.  Has a stationary bike, but is limited due to knee pains.    Ortho:  Dr. Hood at [facility]  Eyes:  [place]  Cardiology:  Dr. Vipin Mercedes  Gyn:  Dr. Jami Brewer at [facility]      Colon: 2015. Repeat in 10 yrs ()    Pap: 10/22/2019. Due in   MM2021    Eyes: 5/10/2022  Foot: 2022    **Labs at [facility]**      The following were updated and reviewed by myself in the chart: medications, past medical history, past surgical history, family history, social history, and allergies.     Medications:  Current Outpatient Medications on File Prior to Visit   Medication Sig Dispense Refill    albuterol (VENTOLIN HFA) 90 mcg/actuation inhaler Inhale 2 puffs into the lungs every 6 (six) hours as needed (cough). Rescue inhaler. 18 g 3    aspirin (ECOTRIN) 81 MG EC tablet Take 81 mg by mouth.      BD ULTRA-FINE SHORT PEN NEEDLE 31 gauge x 5/16" Ndle Inject 1 Syringe into the skin.      clopidogreL (PLAVIX) 75 mg tablet Take 75 mg by mouth.      " DULoxetine (CYMBALTA) 30 MG capsule Take 1 capsule (30 mg total) by mouth once daily. 90 capsule 3    empagliflozin-metformin (SYNJARDY XR) 12.5-1,000 mg TBph Take 2 tablets by mouth once daily. 180 tablet 3    furosemide (LASIX) 20 MG tablet Take 20 mg by mouth once daily.      insulin (BASAGLAR KWIKPEN U-100 INSULIN) glargine 100 units/mL SubQ pen Inject 16 Units into the skin once daily. 15 mL 3    metoprolol succinate (TOPROL-XL) 100 MG 24 hr tablet Take 100 mg by mouth.      omega-3 fatty acids/fish oil (FISH OIL-OMEGA-3 FATTY ACIDS) 300-1,000 mg capsule Take 2 g by mouth.      pantoprazole (PROTONIX) 40 MG tablet Take 40 mg by mouth.      pravastatin (PRAVACHOL) 20 MG tablet Take 1 tablet (20 mg total) by mouth once daily. 90 tablet 3    vitamin D (VITAMIN D3) 1000 units Tab Take 1,000 Units by mouth once daily.      empagliflozin-metformin (SYNJARDY) 12.5-1,000 mg Tab Take by mouth.       No current facility-administered medications on file prior to visit.        PMHx:  Past Medical History:   Diagnosis Date    Arthritis     Coronary artery disease     Diabetes mellitus, type 2     Fibromyalgia     Hyperlipidemia     Hypertension       There are no problems to display for this patient.       PSHx:  Past Surgical History:   Procedure Laterality Date    TONSILLECTOMY      TUBAL LIGATION          FHx:  Family History   Problem Relation Age of Onset    Diabetes Mother     Hyperlipidemia Mother     Heart attack Mother     Diabetes Father     Hyperlipidemia Father         Social:  Social History     Socioeconomic History    Marital status:    Tobacco Use    Smoking status: Never     Passive exposure: Past    Smokeless tobacco: Never   Substance and Sexual Activity    Alcohol use: Not Currently    Drug use: Never    Sexual activity: Yes     Partners: Male        Allergies:  Review of patient's allergies indicates:   Allergen Reactions    Cefuroxime Hives     No reaction to Keflex and PCN       "Sulfamethoxazole-trimethoprim Hives    Cefuroxime axetil Hives    Empagliflozin Nausea And Vomiting    Sulfamethoxazole Hives    Trimethoprim Hives        ROS:  Review of Systems   Constitutional:  Negative for activity change, appetite change, chills and fever.   HENT:  Negative for congestion, postnasal drip, rhinorrhea, sore throat and trouble swallowing.    Respiratory:  Negative for cough, shortness of breath and wheezing.    Cardiovascular:  Negative for chest pain and palpitations.   Gastrointestinal:  Negative for abdominal pain, constipation, diarrhea, nausea and vomiting.   Genitourinary:  Negative for difficulty urinating.   Musculoskeletal:  Negative for arthralgias and myalgias.   Skin:  Positive for wound.   Neurological:  Negative for speech difficulty and headaches.   All other systems reviewed and are negative.       Objective      /82   Pulse 78   Temp 97.6 °F (36.4 °C)   Ht 5' 4" (1.626 m)   Wt 92.6 kg (204 lb 2.3 oz)   BMI 35.04 kg/m²   Ht Readings from Last 3 Encounters:   06/22/23 5' 4" (1.626 m)   05/18/23 5' 4" (1.626 m)   05/09/23 5' 4" (1.626 m)     Wt Readings from Last 3 Encounters:   06/22/23 92.6 kg (204 lb 2.3 oz)   05/18/23 98.3 kg (216 lb 11.4 oz)   05/09/23 96.3 kg (212 lb 4.9 oz)       PHYSICAL EXAM:  Physical Exam  Vitals and nursing note reviewed.   Constitutional:       General: She is not in acute distress.     Appearance: Normal appearance.   HENT:      Head: Normocephalic and atraumatic.      Right Ear: Tympanic membrane, ear canal and external ear normal.      Left Ear: Tympanic membrane, ear canal and external ear normal.      Nose: Nose normal. No congestion or rhinorrhea.      Mouth/Throat:      Mouth: Mucous membranes are moist.      Pharynx: Oropharynx is clear. No oropharyngeal exudate or posterior oropharyngeal erythema.   Eyes:      Extraocular Movements: Extraocular movements intact.      Conjunctiva/sclera: Conjunctivae normal.      Pupils: Pupils are " equal, round, and reactive to light.   Cardiovascular:      Rate and Rhythm: Normal rate and regular rhythm.   Pulmonary:      Effort: Pulmonary effort is normal. No respiratory distress.      Breath sounds: No wheezing, rhonchi or rales.   Musculoskeletal:         General: Normal range of motion.      Cervical back: Normal range of motion.   Lymphadenopathy:      Cervical: No cervical adenopathy.   Skin:     General: Skin is warm and dry.      Findings: No rash.             Comments: Wound VAC in place   Neurological:      Mental Status: She is alert.            LABS / IMAGING:  Recent Results (from the past 4368 hour(s))   Aerobic culture    Collection Time: 05/18/23 10:36 AM    Specimen: Abscess; Incision site   Result Value Ref Range    Aerobic Bacterial Culture (A)      METHICILLIN RESISTANT STAPHYLOCOCCUS AUREUS  Many         Susceptibility    Methicillin resistant Staphylococcus aureus - CULTURE, AEROBIC  (SPECIFY SOURCE)     Clindamycin <=0.5 Sensitive mcg/mL     Erythromycin <=0.5 Sensitive mcg/mL     Oxacillin >2 Resistant mcg/mL     Penicillin 8 Resistant mcg/mL     Trimeth/Sulfa <=0.5/9.5 Sensitive mcg/mL     Tetracycline <=4 Sensitive mcg/mL     Vancomycin 2 Sensitive mcg/mL         Assessment    1. Hospital discharge follow-up    2. Incisional abscess    3. S/P CABG x 3    4. Type 2 diabetes mellitus with other specified complication, with long-term current use of insulin          Plan    Jorge was seen today for follow-up.    Diagnoses and all orders for this visit:    Hospital discharge follow-up    Incisional abscess    S/P CABG x 3    Type 2 diabetes mellitus with other specified complication, with long-term current use of insulin      Overall, she looks pretty good today.  Vital signs all look normal.      Wound seems to be healing now that has been debrided.  Wound VAC in place.  Home health coming out.  She has an appointment with the surgeon scheduled for next Wednesday.  We will also be  getting a chest x-ray on Monday or Tuesday, prior to that visit.      Lungs sound nice and clear.  There is some occasionally crackling, gurgling in the lower left lobe.  Almost suspect that this could be some leftover fluid from her recent CHF exacerbation.  Could also represent transmitted bronchial sounds from postnasal drip, rhinitis.  Since she is scheduled to get a chest x-ray on Monday at [facility], she will trying get a copy of the x-ray put on to a desk and drop it off test take a look.    Her A1c was 10% in the hospital and her sugars have been running a bit high (400 range) during her previous hospitalization.  Since coming home, blood sugars appear to be under good control.  Will have her continue the Lantus 30 units daily, along with the Synjardy.  Instead of taking two tablets in the morning, will have her take one tablet, twice a day, to see if that is a bit more gentle on her stomach.  Suspect that the elevated A1c is just due to the increased metabolic demand from infected wound.  We will continue to monitor.  Once she is completely healed, may want to consider restarting on the Ozempic.    FOLLOW-UP:  Follow up in about 3 months (around 9/22/2023) for check up.    I spent a total of 45 minutes face to face and non-face to face on the date of this visit.This includes time preparing to see the patient (eg, review of tests, notes), obtaining and/or reviewing additional history from an independent historian and/or outside medical records, documenting clinical information in the electronic health record, independently interpreting results and/or communicating results to the patient/family/caregiver, or care coordinator.    Signed by:  Ahsan Tripp MD

## 2023-06-22 NOTE — PATIENT INSTRUCTIONS
Physically, everything is looking much better.      Wound seems to be healing.  It will be a slow process.  Keep your appointment with the surgeon next week, as scheduled.      I would recommend getting the chest x-ray on Monday or Tuesday, if possible.  When you get it, please ask them to put the pictures on a disc and then, at your convenience, drop it off with us.  I will be interested in taking a look at the lung area in the side where you are feeling that funny sensation.    For your sugar, let us stay on 30 units of insulin each day.  You can try splitting the Synjardy up into one tablet, every 12 hours, instead of taking both of them at the same time.  This might be a little easier on your system.    If you would like to meet with the nutritionist, just let me know.  I can always place a referral.    Continue to eat a healthy diet.  Be careful with portion sizes.  Includes lots of fresh fruits, vegetables, whole grains, lean proteins.  See info below.    Keep hydrated.  Be sure to drink at least 8-10, 8 oz, glasses of water every day.    Stay active.  Try to do some sort of physical activity every day.  Nothing outrageous, just try walking for 10-15 minutes each day.

## 2023-06-29 ENCOUNTER — PATIENT MESSAGE (OUTPATIENT)
Dept: PRIMARY CARE CLINIC | Facility: CLINIC | Age: 58
End: 2023-06-29
Payer: COMMERCIAL

## 2023-06-30 ENCOUNTER — OFFICE VISIT (OUTPATIENT)
Dept: PRIMARY CARE CLINIC | Facility: CLINIC | Age: 58
End: 2023-06-30
Payer: COMMERCIAL

## 2023-06-30 DIAGNOSIS — E11.69 TYPE 2 DIABETES MELLITUS WITH OTHER SPECIFIED COMPLICATION, WITH LONG-TERM CURRENT USE OF INSULIN: ICD-10-CM

## 2023-06-30 DIAGNOSIS — L29.9 ITCHING: Primary | ICD-10-CM

## 2023-06-30 DIAGNOSIS — Z79.4 TYPE 2 DIABETES MELLITUS WITH OTHER SPECIFIED COMPLICATION, WITH LONG-TERM CURRENT USE OF INSULIN: ICD-10-CM

## 2023-06-30 PROCEDURE — 4010F PR ACE/ARB THEARPY RXD/TAKEN: ICD-10-PCS | Mod: CPTII,95,, | Performed by: FAMILY MEDICINE

## 2023-06-30 PROCEDURE — 4010F ACE/ARB THERAPY RXD/TAKEN: CPT | Mod: CPTII,95,, | Performed by: FAMILY MEDICINE

## 2023-06-30 PROCEDURE — 99214 OFFICE O/P EST MOD 30 MIN: CPT | Mod: 95,,, | Performed by: FAMILY MEDICINE

## 2023-06-30 PROCEDURE — 99214 PR OFFICE/OUTPT VISIT, EST, LEVL IV, 30-39 MIN: ICD-10-PCS | Mod: 95,,, | Performed by: FAMILY MEDICINE

## 2023-06-30 RX ORDER — HYDROXYZINE HYDROCHLORIDE 25 MG/1
25 TABLET, FILM COATED ORAL 3 TIMES DAILY PRN
Qty: 30 TABLET | Refills: 2 | Status: SHIPPED | OUTPATIENT
Start: 2023-06-30 | End: 2024-01-30

## 2023-06-30 NOTE — PATIENT INSTRUCTIONS
Do your best to keep your skin clean and dry.      Apply moisturizing lotion twice a day to keep the skin hydrated.  This can help cut down on some itching.      In the mornings, try taking an OTC antihistamine, such as Claritin or Zyrtec each day.    In the evening, try taking my prescription Atarax (hydroxyzine).  This can help cut down on some of the itching symptoms.    If the Atarax does not make you too sleepy or drowsy, you can take it during the day, as well.    Glad to hear about your sugar!  If your morning, fasting, blood sugars stay in the 110-120 range, you can cut down on your insulin by 2 or 3 units every three days.  If they start rising to 140, or above, then go up to the previous dose.

## 2023-06-30 NOTE — PROGRESS NOTES
"    [facility]       [address] [place] [address]      Phone: [phone]      Fax: [phone]    Ahsan Tripp MD                Video Visit  2023        Subjective      HPI:  Jorge Jones is a 58 y.o. female presents today via video for "No chief complaint on file.  ."     58-year-old female presents today via video visit complaining of itching.    Patient states that she started itching on Monday, four days ago.  All over.  Back, stomach, head.  No rashes, bumps, lesions.  She does have a healing, surgical incision across her sternum.  Wound VAC in place.  Because of this, they do not want her taking full showers, baths.  They prefer her sponge bathing instead.  She is been doing this.  No one else in the household is itching.  She has not tried anything for it, except using a dry shower brush to scratch hard to reach places.      She also has diabetes.  Takes Synjardy.  Also uses Lantus.  Lately, her blood sugar has been around 110-120 in the mornings, fasting.  She is been able to titrate back on her Lantus.  Now she is taking 24 units each day.    PMH:  Dm, HTN, CAD, HLD, LAURIE, fibromyalgia  PSH:  Tonsils, BTL  FMH:  Dm, HLD, HTN, CAD/mi, AFib  Allergies:  Ceftin causes hives.  Bactrim/sulfa also causes hives.  Social:  Stays at home.  .  Denies T/a/D    Exercise:  No regular exercise program.  Has a stationary bike, but is limited due to knee pains.    Ortho:  Dr. Hood at [facility]  Eyes:  [place]  Cardiology:  Dr. Vipin Mercedes  Gyn:  Dr. Jami Brewer at [facility]      Colon: 2015. Repeat in 10 yrs ()    Pap: 10/22/2019. Due in   MM2021    Eyes: 5/10/2022  Foot: 2022    **Labs at [facility]**      The following were updated and reviewed by myself in the chart: medications, past medical history, past surgical history, family history, social history, and allergies.     Medications:  Current Outpatient Medications on File " "Prior to Visit   Medication Sig Dispense Refill    albuterol (VENTOLIN HFA) 90 mcg/actuation inhaler Inhale 2 puffs into the lungs every 6 (six) hours as needed (cough). Rescue inhaler. 18 g 3    aspirin (ECOTRIN) 81 MG EC tablet Take 81 mg by mouth.      BD ULTRA-FINE SHORT PEN NEEDLE 31 gauge x 5/16" Ndle Inject 1 Syringe into the skin.      clopidogreL (PLAVIX) 75 mg tablet Take 75 mg by mouth.      DULoxetine (CYMBALTA) 30 MG capsule Take 1 capsule (30 mg total) by mouth once daily. 90 capsule 3    empagliflozin-metformin (SYNJARDY XR) 12.5-1,000 mg TBph Take 2 tablets by mouth once daily. 180 tablet 3    furosemide (LASIX) 20 MG tablet Take 20 mg by mouth once daily.      insulin (BASAGLAR KWIKPEN U-100 INSULIN) glargine 100 units/mL SubQ pen Inject 16 Units into the skin once daily. 15 mL 3    metoprolol succinate (TOPROL-XL) 100 MG 24 hr tablet Take 100 mg by mouth.      omega-3 fatty acids/fish oil (FISH OIL-OMEGA-3 FATTY ACIDS) 300-1,000 mg capsule Take 2 g by mouth.      pantoprazole (PROTONIX) 40 MG tablet Take 40 mg by mouth.      pravastatin (PRAVACHOL) 20 MG tablet Take 1 tablet (20 mg total) by mouth once daily. 90 tablet 3    vitamin D (VITAMIN D3) 1000 units Tab Take 1,000 Units by mouth once daily.      [DISCONTINUED] empagliflozin-metformin (SYNJARDY) 12.5-1,000 mg Tab Take by mouth.       No current facility-administered medications on file prior to visit.        PMHx:  Past Medical History:   Diagnosis Date    Arthritis     Coronary artery disease     Diabetes mellitus, type 2     Fibromyalgia     Hyperlipidemia     Hypertension       There are no problems to display for this patient.       PSHx:  Past Surgical History:   Procedure Laterality Date    TONSILLECTOMY      TUBAL LIGATION          FHx:  Family History   Problem Relation Age of Onset    Diabetes Mother     Hyperlipidemia Mother     Heart attack Mother     Diabetes Father     Hyperlipidemia Father         Social:  Social History " "    Socioeconomic History    Marital status:    Tobacco Use    Smoking status: Never     Passive exposure: Past    Smokeless tobacco: Never   Substance and Sexual Activity    Alcohol use: Not Currently    Drug use: Never    Sexual activity: Yes     Partners: Male        Allergies:  Review of patient's allergies indicates:   Allergen Reactions    Cefuroxime Hives     No reaction to Keflex and PCN      Sulfamethoxazole-trimethoprim Hives    Cefuroxime axetil Hives    Empagliflozin Nausea And Vomiting    Sulfamethoxazole Hives    Trimethoprim Hives        ROS:  Review of Systems   Constitutional:  Negative for activity change and unexpected weight change.   HENT:  Negative for hearing loss, rhinorrhea and trouble swallowing.    Eyes:  Positive for visual disturbance. Negative for discharge.   Respiratory:  Negative for chest tightness and wheezing.    Cardiovascular:  Negative for chest pain and palpitations.   Gastrointestinal:  Negative for blood in stool, constipation, diarrhea and vomiting.   Endocrine: Negative for polydipsia and polyuria.   Genitourinary:  Negative for difficulty urinating, dysuria, hematuria and menstrual problem.   Musculoskeletal:  Positive for arthralgias. Negative for joint swelling and neck pain.   Neurological:  Positive for headaches. Negative for weakness.   Psychiatric/Behavioral:  Negative for confusion and dysphoric mood.         Objective      There were no vitals taken for this visit.  Ht Readings from Last 3 Encounters:   06/22/23 5' 4" (1.626 m)   05/18/23 5' 4" (1.626 m)   05/09/23 5' 4" (1.626 m)     Wt Readings from Last 3 Encounters:   06/22/23 92.6 kg (204 lb 2.3 oz)   05/18/23 98.3 kg (216 lb 11.4 oz)   05/09/23 96.3 kg (212 lb 4.9 oz)       PHYSICAL EXAM:  Physical Exam  Constitutional:       General: She is not in acute distress.     Appearance: Normal appearance. She is not ill-appearing.   HENT:      Head: Normocephalic and atraumatic.   Pulmonary:      Effort: " Pulmonary effort is normal. No respiratory distress.   Neurological:      Mental Status: She is alert.   Psychiatric:         Mood and Affect: Mood normal.         Behavior: Behavior normal.         Thought Content: Thought content normal.            LABS / IMAGING:  Recent Results (from the past 4368 hour(s))   Aerobic culture    Collection Time: 05/18/23 10:36 AM    Specimen: Abscess; Incision site   Result Value Ref Range    Aerobic Bacterial Culture (A)      METHICILLIN RESISTANT STAPHYLOCOCCUS AUREUS  Many         Susceptibility    Methicillin resistant Staphylococcus aureus - CULTURE, AEROBIC  (SPECIFY SOURCE)     Clindamycin <=0.5 Sensitive mcg/mL     Erythromycin <=0.5 Sensitive mcg/mL     Oxacillin >2 Resistant mcg/mL     Penicillin 8 Resistant mcg/mL     Trimeth/Sulfa <=0.5/9.5 Sensitive mcg/mL     Tetracycline <=4 Sensitive mcg/mL     Vancomycin 2 Sensitive mcg/mL         Assessment    1. Itching    2. Type 2 diabetes mellitus with other specified complication, with long-term current use of insulin          Plan    Diagnoses and all orders for this visit:    Itching  -     hydrOXYzine HCL (ATARAX) 25 MG tablet; Take 1 tablet (25 mg total) by mouth 3 (three) times daily as needed for Itching.    Type 2 diabetes mellitus with other specified complication, with long-term current use of insulin      Suspect the itching is due to the interruption in her regular bathing schedule.    Recommended OTC moisturizing cream, twice a day.    OTC antihistamine, such as Claritin or Zyrtec in the morning.  Hydroxyzine in the evening.  If not too sedating, she can take the hydroxyzine t.i.d..    Blood sugar seems to be doing very well! Continue Synjardy.  Okay to titrate Lantus down by two or 3 units every three days as long as morning blood sugar stays below 140.     FOLLOW-UP:  Follow up if symptoms worsen or fail to improve.    The patient location is: [place]  The chief complaint leading to consultation is:  itching    Visit type: audiovisual    Face to Face time with patient: 10 minutes    35 minutes of total time spent on the encounter, which includes face to face time and non-face to face time preparing to see the patient (eg, review of tests), Obtaining and/or reviewing separately obtained history, Documenting clinical information in the electronic or other health record, Independently interpreting results (not separately reported) and communicating results to the patient/family/caregiver, or Care coordination (not separately reported).     Each patient to whom he or she provides medical services by telemedicine is:  (1) informed of the relationship between the physician and patient and the respective role of any other health care provider with respect to management of the patient; and (2) notified that he or she may decline to receive medical services by telemedicine and may withdraw from such care at any time.    Signed by:  Ahsan Tripp MD

## 2023-08-10 ENCOUNTER — TELEPHONE (OUTPATIENT)
Dept: SPORTS MEDICINE | Facility: CLINIC | Age: 58
End: 2023-08-10
Payer: COMMERCIAL

## 2023-08-10 ENCOUNTER — OFFICE VISIT (OUTPATIENT)
Dept: PRIMARY CARE CLINIC | Facility: CLINIC | Age: 58
End: 2023-08-10
Payer: COMMERCIAL

## 2023-08-10 VITALS
HEIGHT: 64 IN | SYSTOLIC BLOOD PRESSURE: 142 MMHG | DIASTOLIC BLOOD PRESSURE: 90 MMHG | HEART RATE: 80 BPM | BODY MASS INDEX: 35.04 KG/M2 | TEMPERATURE: 98 F | WEIGHT: 205.25 LBS

## 2023-08-10 DIAGNOSIS — Z11.4 ENCOUNTER FOR SCREENING FOR HIV: ICD-10-CM

## 2023-08-10 DIAGNOSIS — G89.29 CHRONIC RIGHT SHOULDER PAIN: ICD-10-CM

## 2023-08-10 DIAGNOSIS — Z79.4 TYPE 2 DIABETES MELLITUS WITH OTHER SPECIFIED COMPLICATION, WITH LONG-TERM CURRENT USE OF INSULIN: Primary | ICD-10-CM

## 2023-08-10 DIAGNOSIS — I25.810 CORONARY ARTERY DISEASE INVOLVING CORONARY BYPASS GRAFT OF NATIVE HEART WITHOUT ANGINA PECTORIS: ICD-10-CM

## 2023-08-10 DIAGNOSIS — E11.69 TYPE 2 DIABETES MELLITUS WITH OTHER SPECIFIED COMPLICATION, WITH LONG-TERM CURRENT USE OF INSULIN: Primary | ICD-10-CM

## 2023-08-10 DIAGNOSIS — M25.511 CHRONIC RIGHT SHOULDER PAIN: ICD-10-CM

## 2023-08-10 DIAGNOSIS — I10 HYPERTENSION, UNSPECIFIED TYPE: ICD-10-CM

## 2023-08-10 DIAGNOSIS — E78.5 HYPERLIPIDEMIA, UNSPECIFIED HYPERLIPIDEMIA TYPE: ICD-10-CM

## 2023-08-10 DIAGNOSIS — M25.511 RIGHT SHOULDER PAIN, UNSPECIFIED CHRONICITY: Primary | ICD-10-CM

## 2023-08-10 DIAGNOSIS — L84 CALLUS OF FOOT: ICD-10-CM

## 2023-08-10 PROCEDURE — 3077F PR MOST RECENT SYSTOLIC BLOOD PRESSURE >= 140 MM HG: ICD-10-PCS | Mod: CPTII,S$GLB,, | Performed by: FAMILY MEDICINE

## 2023-08-10 PROCEDURE — 3080F PR MOST RECENT DIASTOLIC BLOOD PRESSURE >= 90 MM HG: ICD-10-PCS | Mod: CPTII,S$GLB,, | Performed by: FAMILY MEDICINE

## 2023-08-10 PROCEDURE — 99999 PR PBB SHADOW E&M-EST. PATIENT-LVL V: CPT | Mod: PBBFAC,,, | Performed by: FAMILY MEDICINE

## 2023-08-10 PROCEDURE — 4010F ACE/ARB THERAPY RXD/TAKEN: CPT | Mod: CPTII,S$GLB,, | Performed by: FAMILY MEDICINE

## 2023-08-10 PROCEDURE — 3008F PR BODY MASS INDEX (BMI) DOCUMENTED: ICD-10-PCS | Mod: CPTII,S$GLB,, | Performed by: FAMILY MEDICINE

## 2023-08-10 PROCEDURE — 3080F DIAST BP >= 90 MM HG: CPT | Mod: CPTII,S$GLB,, | Performed by: FAMILY MEDICINE

## 2023-08-10 PROCEDURE — 1159F PR MEDICATION LIST DOCUMENTED IN MEDICAL RECORD: ICD-10-PCS | Mod: CPTII,S$GLB,, | Performed by: FAMILY MEDICINE

## 2023-08-10 PROCEDURE — 99999 PR PBB SHADOW E&M-EST. PATIENT-LVL V: ICD-10-PCS | Mod: PBBFAC,,, | Performed by: FAMILY MEDICINE

## 2023-08-10 PROCEDURE — 4010F PR ACE/ARB THEARPY RXD/TAKEN: ICD-10-PCS | Mod: CPTII,S$GLB,, | Performed by: FAMILY MEDICINE

## 2023-08-10 PROCEDURE — 99215 OFFICE O/P EST HI 40 MIN: CPT | Mod: S$GLB,,, | Performed by: FAMILY MEDICINE

## 2023-08-10 PROCEDURE — 3077F SYST BP >= 140 MM HG: CPT | Mod: CPTII,S$GLB,, | Performed by: FAMILY MEDICINE

## 2023-08-10 PROCEDURE — 3008F BODY MASS INDEX DOCD: CPT | Mod: CPTII,S$GLB,, | Performed by: FAMILY MEDICINE

## 2023-08-10 PROCEDURE — 99215 PR OFFICE/OUTPT VISIT, EST, LEVL V, 40-54 MIN: ICD-10-PCS | Mod: S$GLB,,, | Performed by: FAMILY MEDICINE

## 2023-08-10 PROCEDURE — 1159F MED LIST DOCD IN RCRD: CPT | Mod: CPTII,S$GLB,, | Performed by: FAMILY MEDICINE

## 2023-08-10 RX ORDER — METFORMIN HYDROCHLORIDE 500 MG/1
1000 TABLET, EXTENDED RELEASE ORAL 2 TIMES DAILY WITH MEALS
Qty: 360 TABLET | Refills: 3 | Status: SHIPPED | OUTPATIENT
Start: 2023-08-10 | End: 2024-01-22 | Stop reason: SDUPTHER

## 2023-08-10 RX ORDER — OLMESARTAN MEDOXOMIL AND HYDROCHLOROTHIAZIDE 20/12.5 20; 12.5 MG/1; MG/1
1 TABLET ORAL
COMMUNITY
Start: 2023-07-26 | End: 2024-01-22 | Stop reason: DRUGHIGH

## 2023-08-10 NOTE — PROGRESS NOTES
"    [facility]       [address] [place] [address]      Phone: [phone]      Fax: [phone]    Ahsan Tripp MD                Office Visit  08/10/2023        Subjective      HPI:  Jorge Jones is a 58 y.o. female presents today in clinic for "Follow-up  ."     58-year-old female presents today for follow-up of multiple issues.    Her , Srini, is present with her.  He provides portions of the history.    Overall, she feels okay.  Still having some sternal pains.  Was able to see the plastic surgeon.  They remove the wound VAC. They are trying to schedule a date for a procedure to remove the hardware from the sternum so that hopefully it will heal the rest of the way.  Sometimes gets a little sore.  Hurts more when she moves certain directions.    Has diabetes.  Has been taking Synjardy, Lantus.  States Synjardy to cause urinary issues.  Would like to get off of it.  Blood sugars have been running 140-160s in the mornings, depending on what she has for supper.  Still taking Lantus 24 units daily.  Has eye doctor appointment on August 22.    Blood pressure has been running high lately.  Saw Cardiology a couple of weeks ago.  Dr. Mercedes changed her metoprolol to olmesartan-HCTZ 20-12.5 mg.  Blood pressure went up even higher, so did heart rate.  She saw PA who restarted Toprol, but at 50 daily.  Continued the olmesartan-HCTZ.  Blood pressure is still running 150/90s at home.  Has an appointment with them to follow-up in about two weeks.    Still having discomfort in her right shoulder.  Hurts to raise above her head.  Can not take Mobic anymore because of the Plavix, blood pressure, recent heart surgery.    PMH:  Dm, HTN, CAD, HLD, LAURIE, fibromyalgia  PSH:  Tonsils, BTL  FMH:  Dm, HLD, HTN, CAD/mi, AFib  Allergies:  Ceftin causes hives.  Bactrim/sulfa also causes hives.  Social:  Stays at home.  .  Denies T/a/D    Exercise:  No regular exercise " "program.  Has a stationary bike, but is limited due to knee pains.    Ortho:  Dr. Hood at [facility]  Eyes:  Kat  Cardiology:  Dr. Vipin Mercedes  [facility]      Colon: 2015. Repeat in 10 yrs ()    Pap: 10/22/2019. Due in   MM2021    Eyes: 5/10/2022  Foot: 2022    **Labs at [facility]**        The following were updated and reviewed by myself in the chart: medications, past medical history, past surgical history, family history, social history, and allergies.     Medications:  Current Outpatient Medications on File Prior to Visit   Medication Sig Dispense Refill    albuterol (VENTOLIN HFA) 90 mcg/actuation inhaler Inhale 2 puffs into the lungs every 6 (six) hours as needed (cough). Rescue inhaler. 18 g 3    aspirin (ECOTRIN) 81 MG EC tablet Take 81 mg by mouth.      BD ULTRA-FINE SHORT PEN NEEDLE 31 gauge x 5/16" Ndle Inject 1 Syringe into the skin.      clopidogreL (PLAVIX) 75 mg tablet Take 75 mg by mouth.      DULoxetine (CYMBALTA) 30 MG capsule Take 1 capsule (30 mg total) by mouth once daily. 90 capsule 3    furosemide (LASIX) 20 MG tablet Take 20 mg by mouth once daily.      hydrOXYzine HCL (ATARAX) 25 MG tablet Take 1 tablet (25 mg total) by mouth 3 (three) times daily as needed for Itching. 30 tablet 2    insulin (BASAGLAR KWIKPEN U-100 INSULIN) glargine 100 units/mL SubQ pen Inject 16 Units into the skin once daily. 15 mL 3    metoprolol succinate (TOPROL-XL) 100 MG 24 hr tablet Take 100 mg by mouth.      olmesartan-hydrochlorothiazide (BENICAR HCT) 20-12.5 mg per tablet Take 1 tablet by mouth.      omega-3 fatty acids/fish oil (FISH OIL-OMEGA-3 FATTY ACIDS) 300-1,000 mg capsule Take 2 g by mouth.      pantoprazole (PROTONIX) 40 MG tablet Take 40 mg by mouth.      pravastatin (PRAVACHOL) 20 MG tablet Take 1 tablet (20 mg total) by mouth once daily. 90 tablet 3    vitamin D (VITAMIN D3) 1000 units Tab Take 1,000 Units by mouth once daily.      [DISCONTINUED] " empagliflozin-metformin (SYNJARDY XR) 12.5-1,000 mg TBph Take 2 tablets by mouth once daily. 180 tablet 3     No current facility-administered medications on file prior to visit.        PMHx:  Past Medical History:   Diagnosis Date    Arthritis     Coronary artery disease     Diabetes mellitus, type 2     Fibromyalgia     Hyperlipidemia     Hypertension       There are no problems to display for this patient.       PSHx:  Past Surgical History:   Procedure Laterality Date    TONSILLECTOMY      TUBAL LIGATION          FHx:  Family History   Problem Relation Age of Onset    Diabetes Mother     Hyperlipidemia Mother     Heart attack Mother     Diabetes Father     Hyperlipidemia Father         Social:  Social History     Socioeconomic History    Marital status:    Tobacco Use    Smoking status: Never     Passive exposure: Past    Smokeless tobacco: Never   Substance and Sexual Activity    Alcohol use: Not Currently    Drug use: Never    Sexual activity: Yes     Partners: Male        Allergies:  Review of patient's allergies indicates:   Allergen Reactions    Cefuroxime Hives     No reaction to Keflex and PCN      Sulfamethoxazole-trimethoprim Hives and Nausea And Vomiting    Cefuroxime axetil Hives and Nausea And Vomiting    Empagliflozin Nausea And Vomiting    Sulfamethoxazole Hives    Trimethoprim Hives        ROS:  Review of Systems   Constitutional:  Negative for activity change, appetite change, chills and fever.   HENT:  Negative for congestion, postnasal drip, rhinorrhea, sore throat and trouble swallowing.    Respiratory:  Negative for cough, shortness of breath and wheezing.    Cardiovascular:  Negative for chest pain and palpitations.   Gastrointestinal:  Negative for abdominal pain, constipation, diarrhea, nausea and vomiting.   Genitourinary:  Negative for difficulty urinating.   Musculoskeletal:  Positive for arthralgias. Negative for myalgias.   Skin:  Negative for color change and rash.  "  Neurological:  Negative for speech difficulty and headaches.   All other systems reviewed and are negative.         Objective      BP (!) 142/90   Pulse 80   Temp 97.9 °F (36.6 °C)   Ht 5' 4" (1.626 m)   Wt 93.1 kg (205 lb 4 oz)   BMI 35.23 kg/m²   Ht Readings from Last 3 Encounters:   08/10/23 5' 4" (1.626 m)   06/22/23 5' 4" (1.626 m)   05/18/23 5' 4" (1.626 m)     Wt Readings from Last 3 Encounters:   08/10/23 93.1 kg (205 lb 4 oz)   06/22/23 92.6 kg (204 lb 2.3 oz)   05/18/23 98.3 kg (216 lb 11.4 oz)       PHYSICAL EXAM:  Physical Exam  Vitals and nursing note reviewed.   Constitutional:       General: She is not in acute distress.     Appearance: Normal appearance.   HENT:      Head: Normocephalic and atraumatic.      Right Ear: Tympanic membrane, ear canal and external ear normal.      Left Ear: Tympanic membrane, ear canal and external ear normal.      Nose: Nose normal. No congestion or rhinorrhea.      Mouth/Throat:      Mouth: Mucous membranes are moist.      Pharynx: Oropharynx is clear. No oropharyngeal exudate or posterior oropharyngeal erythema.   Eyes:      Extraocular Movements: Extraocular movements intact.      Conjunctiva/sclera: Conjunctivae normal.      Pupils: Pupils are equal, round, and reactive to light.   Cardiovascular:      Rate and Rhythm: Normal rate and regular rhythm.   Pulmonary:      Effort: Pulmonary effort is normal. No respiratory distress.      Breath sounds: No wheezing, rhonchi or rales.   Musculoskeletal:         General: Normal range of motion.      Cervical back: Normal range of motion.   Lymphadenopathy:      Cervical: No cervical adenopathy.   Skin:     General: Skin is warm and dry.      Findings: No rash.   Neurological:      Mental Status: She is alert.        Diabetic foot exam:   Left: Filament test present  Right: Filament test present    Lateral aspect of both feet have a callus formation.      LABS / IMAGING:  Recent Results (from the past 4368 hour(s)) "   Aerobic culture    Collection Time: 05/18/23 10:36 AM    Specimen: Abscess; Incision site   Result Value Ref Range    Aerobic Bacterial Culture (A)      METHICILLIN RESISTANT STAPHYLOCOCCUS AUREUS  Many         Susceptibility    Methicillin resistant Staphylococcus aureus - CULTURE, AEROBIC  (SPECIFY SOURCE)     Clindamycin <=0.5 Sensitive mcg/mL     Erythromycin <=0.5 Sensitive mcg/mL     Oxacillin >2 Resistant mcg/mL     Penicillin 8 Resistant mcg/mL     Trimeth/Sulfa <=0.5/9.5 Sensitive mcg/mL     Tetracycline <=4 Sensitive mcg/mL     Vancomycin 2 Sensitive mcg/mL         Assessment    1. Type 2 diabetes mellitus with other specified complication, with long-term current use of insulin    2. Hypertension, unspecified type    3. Hyperlipidemia, unspecified hyperlipidemia type    4. Coronary artery disease involving coronary bypass graft of native heart without angina pectoris    5. Callus of foot    6. Chronic right shoulder pain    7. Encounter for screening for HIV          Fatou Patel was seen today for follow-up.    Diagnoses and all orders for this visit:    Type 2 diabetes mellitus with other specified complication, with long-term current use of insulin  -     Ambulatory referral/consult to Podiatry; Future  -     Hemoglobin A1C; Future  -     Microalbumin/Creatinine Ratio, Urine; Future  -     Hemoglobin A1C  -     Microalbumin/Creatinine Ratio, Urine  -     semaglutide (OZEMPIC) 0.25 mg or 0.5 mg (2 mg/3 mL) pen injector; Inject 0.5 mg into the skin every 7 days.  -     metFORMIN (GLUCOPHAGE-XR) 500 MG ER 24hr tablet; Take 2 tablets (1,000 mg total) by mouth 2 (two) times daily with meals.    Hypertension, unspecified type  -     CBC Auto Differential; Future  -     Comprehensive Metabolic Panel; Future  -     TSH; Future  -     Lipid Panel; Future  -     CBC Auto Differential  -     Comprehensive Metabolic Panel  -     TSH  -     Lipid Panel    Hyperlipidemia, unspecified hyperlipidemia type  -      Lipid Panel; Future  -     Lipid Panel    Coronary artery disease involving coronary bypass graft of native heart without angina pectoris    Callus of foot  -     Ambulatory referral/consult to Podiatry; Future    Chronic right shoulder pain  -     Ambulatory referral/consult to Sports Medicine; Future    Encounter for screening for HIV  -     HIV 1/2 Ag/Ab (4th Gen); Future  -     HIV 1/2 Ag/Ab (4th Gen)      Screening labs today at [facility].      Monitor blood pressure at home.  By Monday, if still elevated, will have her double up on the olmesartan-HCTZ so that she is taking 40-25 mg.  Continue Toprol-XL daily.  Keep follow-up appointment with Cardiology.    For blood sugar, recheck A1c today.  Stops Synjardy.  Restart metformin.  Restart Ozempic.  Titrate up to 0.5 mg weekly.  Continue Lantus 24 units.  Recheck in three months.  Referral to Podiatry.    Will get copies of mammogram, Pap from gyn.    Dr. Zuniga for shoulder.    Handicap tag renewed today.    FOLLOW-UP:  Follow up in about 3 months (around 11/10/2023) for recheck.    I spent a total of 45 minutes face to face and non-face to face on the date of this visit.This includes time preparing to see the patient (eg, review of tests, notes), obtaining and/or reviewing additional history from an independent historian and/or outside medical records, documenting clinical information in the electronic health record, independently interpreting results and/or communicating results to the patient/family/caregiver, or care coordinator.    Signed by:  Ahsan Tripp MD

## 2023-08-10 NOTE — PATIENT INSTRUCTIONS
Physically, everything looks pretty good today.      Blood pressure, however like.      Keep checking your pressure through the weekend.  Come Monday, if your top number stays above 135 and/or your bottom number stays above 85, go ahead and start taking two tablets of the olmesartan-HCTZ each morning.  Keep your follow-up appointment with the cardiologist in a couple of weeks.    Let us get some blood work done today to check things on the inside.  Orders placed for [facility].  We will contact you with results as soon as they are available.    Since the Jardiance part of Synjardy is causing issues, let us go ahead and discontinue that today.  Sending new prescription for Ozempic and metformin to the pharmacy.  When they arrive, start with just 0.25mg of Ozempic for the 1st four weeks.  Then, increase to 0.5 mg weekly.  Let us stay on that for about three months, then recheck your A1c.    Separately, would like you to see the podiatrist to get the callus looked at.  Referral placed today.      Let us also have you see our sports medicine specialist, Dr. Zuniga, to take a look at the shoulder.  He may be able to offer long-term relief.    Continue to eat a healthy diet.  Be careful with portion sizes.  Includes lots of fresh fruits, vegetables, whole grains, lean proteins.  See info below.    Keep hydrated.  Be sure to drink at least 8-10, 8 oz, glasses of water every day.    Stay active.  Try to do some sort of physical activity every day.  Nothing outrageous, just try walking for 10-15 minutes each day.

## 2023-08-13 LAB
ALBUMIN SERPL-MCNC: 4.1 G/DL (ref 3.6–5.1)
ALBUMIN/CREAT UR: 27 MCG/MG CREAT
ALBUMIN/GLOB SERPL: 1.1 (CALC) (ref 1–2.5)
ALP SERPL-CCNC: 104 U/L (ref 37–153)
ALT SERPL-CCNC: 6 U/L (ref 6–29)
AST SERPL-CCNC: 9 U/L (ref 10–35)
BASOPHILS # BLD AUTO: 54 CELLS/UL (ref 0–200)
BASOPHILS NFR BLD AUTO: 0.6 %
BILIRUB SERPL-MCNC: 0.3 MG/DL (ref 0.2–1.2)
BUN SERPL-MCNC: 11 MG/DL (ref 7–25)
BUN/CREAT SERPL: ABNORMAL (CALC) (ref 6–22)
CALCIUM SERPL-MCNC: 9.6 MG/DL (ref 8.6–10.4)
CHLORIDE SERPL-SCNC: 101 MMOL/L (ref 98–110)
CHOLEST SERPL-MCNC: 137 MG/DL
CHOLEST/HDLC SERPL: 3.5 (CALC)
CO2 SERPL-SCNC: 28 MMOL/L (ref 20–32)
CREAT SERPL-MCNC: 0.63 MG/DL (ref 0.5–1.05)
CREAT UR-MCNC: 62 MG/DL (ref 20–275)
EGFR: 101 ML/MIN/1.73M2
EOSINOPHIL # BLD AUTO: 171 CELLS/UL (ref 15–500)
EOSINOPHIL NFR BLD AUTO: 1.9 %
ERYTHROCYTE [DISTWIDTH] IN BLOOD BY AUTOMATED COUNT: 13.4 % (ref 11–15)
GLOBULIN SER CALC-MCNC: 3.9 G/DL (CALC) (ref 1.9–3.7)
GLUCOSE SERPL-MCNC: 141 MG/DL (ref 65–99)
HBA1C MFR BLD: 8.2 % OF TOTAL HGB
HCT VFR BLD AUTO: 42.2 % (ref 35–45)
HDLC SERPL-MCNC: 39 MG/DL
HGB BLD-MCNC: 13.5 G/DL (ref 11.7–15.5)
HIV 1+2 AB+HIV1 P24 AG SERPL QL IA: NORMAL
LDLC SERPL CALC-MCNC: 78 MG/DL (CALC)
LYMPHOCYTES # BLD AUTO: 3087 CELLS/UL (ref 850–3900)
LYMPHOCYTES NFR BLD AUTO: 34.3 %
MCH RBC QN AUTO: 25.4 PG (ref 27–33)
MCHC RBC AUTO-ENTMCNC: 32 G/DL (ref 32–36)
MCV RBC AUTO: 79.5 FL (ref 80–100)
MICROALBUMIN UR-MCNC: 1.7 MG/DL
MONOCYTES # BLD AUTO: 612 CELLS/UL (ref 200–950)
MONOCYTES NFR BLD AUTO: 6.8 %
NEUTROPHILS # BLD AUTO: 5076 CELLS/UL (ref 1500–7800)
NEUTROPHILS NFR BLD AUTO: 56.4 %
NONHDLC SERPL-MCNC: 98 MG/DL (CALC)
PLATELET # BLD AUTO: 498 THOUSAND/UL (ref 140–400)
PMV BLD REES-ECKER: 8.7 FL (ref 7.5–12.5)
POTASSIUM SERPL-SCNC: 4.3 MMOL/L (ref 3.5–5.3)
PROT SERPL-MCNC: 8 G/DL (ref 6.1–8.1)
RBC # BLD AUTO: 5.31 MILLION/UL (ref 3.8–5.1)
SODIUM SERPL-SCNC: 142 MMOL/L (ref 135–146)
TRIGL SERPL-MCNC: 114 MG/DL
TSH SERPL-ACNC: 1.46 MIU/L (ref 0.4–4.5)
WBC # BLD AUTO: 9 THOUSAND/UL (ref 3.8–10.8)

## 2023-08-14 ENCOUNTER — HOSPITAL ENCOUNTER (OUTPATIENT)
Dept: RADIOLOGY | Facility: HOSPITAL | Age: 58
Discharge: HOME | End: 2023-08-14
Attending: STUDENT IN AN ORGANIZED HEALTH CARE EDUCATION/TRAINING PROGRAM
Payer: COMMERCIAL

## 2023-08-14 ENCOUNTER — OFFICE VISIT (OUTPATIENT)
Dept: SPORTS MEDICINE | Facility: CLINIC | Age: 58
End: 2023-08-14
Payer: COMMERCIAL

## 2023-08-14 DIAGNOSIS — Z79.4 TYPE 2 DIABETES MELLITUS WITH OTHER CIRCULATORY COMPLICATION, WITH LONG-TERM CURRENT USE OF INSULIN: ICD-10-CM

## 2023-08-14 DIAGNOSIS — M67.911 TENDINOPATHY OF RIGHT ROTATOR CUFF: Primary | ICD-10-CM

## 2023-08-14 DIAGNOSIS — M75.31 CALCIFIC TENDINITIS OF RIGHT SHOULDER: ICD-10-CM

## 2023-08-14 DIAGNOSIS — I10 ESSENTIAL HYPERTENSION: ICD-10-CM

## 2023-08-14 DIAGNOSIS — E11.59 TYPE 2 DIABETES MELLITUS WITH OTHER CIRCULATORY COMPLICATION, WITH LONG-TERM CURRENT USE OF INSULIN: ICD-10-CM

## 2023-08-14 DIAGNOSIS — M25.511 CHRONIC RIGHT SHOULDER PAIN: ICD-10-CM

## 2023-08-14 DIAGNOSIS — G89.29 CHRONIC RIGHT SHOULDER PAIN: ICD-10-CM

## 2023-08-14 DIAGNOSIS — M75.41 SUBACROMIAL IMPINGEMENT OF RIGHT SHOULDER: ICD-10-CM

## 2023-08-14 DIAGNOSIS — M25.511 RIGHT SHOULDER PAIN, UNSPECIFIED CHRONICITY: ICD-10-CM

## 2023-08-14 DIAGNOSIS — I25.810 CORONARY ARTERY DISEASE INVOLVING CORONARY BYPASS GRAFT OF NATIVE HEART WITHOUT ANGINA PECTORIS: ICD-10-CM

## 2023-08-14 PROCEDURE — 73030 XR SHOULDER COMPLETE 2 OR MORE VIEWS RIGHT: ICD-10-PCS | Mod: 26,RT,, | Performed by: RADIOLOGY

## 2023-08-14 PROCEDURE — 97110 THERAPEUTIC EXERCISES: CPT | Mod: S$GLB,,, | Performed by: STUDENT IN AN ORGANIZED HEALTH CARE EDUCATION/TRAINING PROGRAM

## 2023-08-14 PROCEDURE — 99204 OFFICE O/P NEW MOD 45 MIN: CPT | Mod: S$GLB,,, | Performed by: STUDENT IN AN ORGANIZED HEALTH CARE EDUCATION/TRAINING PROGRAM

## 2023-08-14 PROCEDURE — 97110 PR THERAPEUTIC EXERCISES: ICD-10-PCS | Mod: S$GLB,,, | Performed by: STUDENT IN AN ORGANIZED HEALTH CARE EDUCATION/TRAINING PROGRAM

## 2023-08-14 PROCEDURE — 1160F RVW MEDS BY RX/DR IN RCRD: CPT | Mod: CPTII,S$GLB,, | Performed by: STUDENT IN AN ORGANIZED HEALTH CARE EDUCATION/TRAINING PROGRAM

## 2023-08-14 PROCEDURE — 3052F HG A1C>EQUAL 8.0%<EQUAL 9.0%: CPT | Mod: CPTII,S$GLB,, | Performed by: STUDENT IN AN ORGANIZED HEALTH CARE EDUCATION/TRAINING PROGRAM

## 2023-08-14 PROCEDURE — 3061F NEG MICROALBUMINURIA REV: CPT | Mod: CPTII,S$GLB,, | Performed by: STUDENT IN AN ORGANIZED HEALTH CARE EDUCATION/TRAINING PROGRAM

## 2023-08-14 PROCEDURE — 73030 X-RAY EXAM OF SHOULDER: CPT | Mod: TC,PN,RT

## 2023-08-14 PROCEDURE — 4010F ACE/ARB THERAPY RXD/TAKEN: CPT | Mod: CPTII,S$GLB,, | Performed by: STUDENT IN AN ORGANIZED HEALTH CARE EDUCATION/TRAINING PROGRAM

## 2023-08-14 PROCEDURE — 99999 PR PBB SHADOW E&M-EST. PATIENT-LVL IV: ICD-10-PCS | Mod: PBBFAC,,, | Performed by: STUDENT IN AN ORGANIZED HEALTH CARE EDUCATION/TRAINING PROGRAM

## 2023-08-14 PROCEDURE — 1159F PR MEDICATION LIST DOCUMENTED IN MEDICAL RECORD: ICD-10-PCS | Mod: CPTII,S$GLB,, | Performed by: STUDENT IN AN ORGANIZED HEALTH CARE EDUCATION/TRAINING PROGRAM

## 2023-08-14 PROCEDURE — 73030 X-RAY EXAM OF SHOULDER: CPT | Mod: 26,RT,, | Performed by: RADIOLOGY

## 2023-08-14 PROCEDURE — 3066F NEPHROPATHY DOC TX: CPT | Mod: CPTII,S$GLB,, | Performed by: STUDENT IN AN ORGANIZED HEALTH CARE EDUCATION/TRAINING PROGRAM

## 2023-08-14 PROCEDURE — 99999 PR PBB SHADOW E&M-EST. PATIENT-LVL IV: CPT | Mod: PBBFAC,,, | Performed by: STUDENT IN AN ORGANIZED HEALTH CARE EDUCATION/TRAINING PROGRAM

## 2023-08-14 PROCEDURE — 3052F PR MOST RECENT HEMOGLOBIN A1C LEVEL 8.0 - < 9.0%: ICD-10-PCS | Mod: CPTII,S$GLB,, | Performed by: STUDENT IN AN ORGANIZED HEALTH CARE EDUCATION/TRAINING PROGRAM

## 2023-08-14 PROCEDURE — 99204 PR OFFICE/OUTPT VISIT, NEW, LEVL IV, 45-59 MIN: ICD-10-PCS | Mod: S$GLB,,, | Performed by: STUDENT IN AN ORGANIZED HEALTH CARE EDUCATION/TRAINING PROGRAM

## 2023-08-14 PROCEDURE — 1159F MED LIST DOCD IN RCRD: CPT | Mod: CPTII,S$GLB,, | Performed by: STUDENT IN AN ORGANIZED HEALTH CARE EDUCATION/TRAINING PROGRAM

## 2023-08-14 PROCEDURE — 3066F PR DOCUMENTATION OF TREATMENT FOR NEPHROPATHY: ICD-10-PCS | Mod: CPTII,S$GLB,, | Performed by: STUDENT IN AN ORGANIZED HEALTH CARE EDUCATION/TRAINING PROGRAM

## 2023-08-14 PROCEDURE — 3061F PR NEG MICROALBUMINURIA RESULT DOCUMENTED/REVIEW: ICD-10-PCS | Mod: CPTII,S$GLB,, | Performed by: STUDENT IN AN ORGANIZED HEALTH CARE EDUCATION/TRAINING PROGRAM

## 2023-08-14 PROCEDURE — 1160F PR REVIEW ALL MEDS BY PRESCRIBER/CLIN PHARMACIST DOCUMENTED: ICD-10-PCS | Mod: CPTII,S$GLB,, | Performed by: STUDENT IN AN ORGANIZED HEALTH CARE EDUCATION/TRAINING PROGRAM

## 2023-08-14 PROCEDURE — 4010F PR ACE/ARB THEARPY RXD/TAKEN: ICD-10-PCS | Mod: CPTII,S$GLB,, | Performed by: STUDENT IN AN ORGANIZED HEALTH CARE EDUCATION/TRAINING PROGRAM

## 2023-08-14 NOTE — PROGRESS NOTES
"      Patient ID: Jorge Jones  YOB: 1965  MRN: [id number]    Chief Complaint: Pain of the Right Shoulder    Referred By: Dr. Tripp    Occupation: Retired    History of Present Illness: Jorge Jones is a right hand dominant 58 y.o. female with heart disease, hypertension, poorly controlled type II DM who presents today with Pain of the Right Shoulder    She began to have right shoulder pain in April 2023 after undergoing a 7 hour open heart surgery where her arms were positioned in horizontal abduction the entire time.  She complains of anterior and lateral shoulder/upper arm pain that worsen at night and with repetitive activity.  She has tried using tylenol without relief.    Past Medical History:   Past Medical History:   Diagnosis Date    Arthritis     Coronary artery disease     Diabetes mellitus, type 2     Fibromyalgia     Hyperlipidemia     Hypertension      Past Surgical History:   Procedure Laterality Date    CORONARY ARTERY BYPASS GRAFT      TONSILLECTOMY      TUBAL LIGATION       Family History   Problem Relation Age of Onset    Diabetes Mother     Hyperlipidemia Mother     Heart attack Mother     Diabetes Father     Hyperlipidemia Father      Social History     Socioeconomic History    Marital status:    Tobacco Use    Smoking status: Never     Passive exposure: Past    Smokeless tobacco: Never   Substance and Sexual Activity    Alcohol use: Not Currently    Drug use: Never    Sexual activity: Yes     Partners: Male     Medication List with Changes/Refills   Current Medications    ALBUTEROL (VENTOLIN HFA) 90 MCG/ACTUATION INHALER    Inhale 2 puffs into the lungs every 6 (six) hours as needed (cough). Rescue inhaler.    ASPIRIN (ECOTRIN) 81 MG EC TABLET    Take 81 mg by mouth.    BD ULTRA-FINE SHORT PEN NEEDLE 31 GAUGE X 5/16" NDLE    Inject 1 Syringe into the skin.    CLOPIDOGREL (PLAVIX) 75 MG TABLET    Take 75 mg by mouth.    DULOXETINE (CYMBALTA) 30 MG CAPSULE    Take 1 capsule " (30 mg total) by mouth once daily.    FUROSEMIDE (LASIX) 20 MG TABLET    Take 20 mg by mouth once daily.    HYDROXYZINE HCL (ATARAX) 25 MG TABLET    Take 1 tablet (25 mg total) by mouth 3 (three) times daily as needed for Itching.    INSULIN (BASAGLAR KWIKPEN U-100 INSULIN) GLARGINE 100 UNITS/ML SUBQ PEN    Inject 16 Units into the skin once daily.    METFORMIN (GLUCOPHAGE-XR) 500 MG ER 24HR TABLET    Take 2 tablets (1,000 mg total) by mouth 2 (two) times daily with meals.    METOPROLOL SUCCINATE (TOPROL-XL) 100 MG 24 HR TABLET    Take 100 mg by mouth.    OLMESARTAN-HYDROCHLOROTHIAZIDE (BENICAR HCT) 20-12.5 MG PER TABLET    Take 1 tablet by mouth.    OMEGA-3 FATTY ACIDS/FISH OIL (FISH OIL-OMEGA-3 FATTY ACIDS) 300-1,000 MG CAPSULE    Take 2 g by mouth.    PANTOPRAZOLE (PROTONIX) 40 MG TABLET    Take 40 mg by mouth.    PRAVASTATIN (PRAVACHOL) 20 MG TABLET    Take 1 tablet (20 mg total) by mouth once daily.    SEMAGLUTIDE (OZEMPIC) 0.25 MG OR 0.5 MG (2 MG/3 ML) PEN INJECTOR    Inject 0.5 mg into the skin every 7 days.    VITAMIN D (VITAMIN D3) 1000 UNITS TAB    Take 1,000 Units by mouth once daily.     Review of patient's allergies indicates:   Allergen Reactions    Cefuroxime Hives     No reaction to Keflex and PCN      Sulfamethoxazole-trimethoprim Hives and Nausea And Vomiting    Cefuroxime axetil Hives and Nausea And Vomiting    Empagliflozin Nausea And Vomiting    Sulfamethoxazole Hives    Trimethoprim Hives       Physical Exam:   There is no height or weight on file to calculate BMI.    Physical Exam  Detailed MSK exam:     Right Shoulder:  Inspection: No swelling, erythema or ecchymosis.   Palpation: Tenderness to palpation Lateral Subacromial space  and Bicipital Groove  Range of motion: 175 deg Flexion         70 deg External Rotation in Adduction         IR to Midthoracic  Strength:  5-/5 Abduction    5-/5 External Rotation in Adduction    5/5 Internal Rotation   Special Tests: Positive  Villasenor-Uvaldo    Positive Neer's    Positive Speed's    Positive Poynette's  Positive Empty Can   N/V Exam:  Radial: Normal motor (EPL/thumbs up)              Normal sensory (dorsal hand)   Median: Normal motor (FPL/A-OK)      Normal sensory (thumb)   Ulnar:  Normal motor (Interossei/scissors-spread)     Normal sensory (5th finger)   LABC: Normal sensory (lateral forearm)   MABC: Normal sensory (medial forearm)   MC: Normal motor (elbow flexion)   Axillary: Normal motor/sensory (deltoid)  Normal radial and ulnar pulses, warm and well perfused with capillary refill < 2 sec       Imaging:  X-ray Shoulder 2 or More Views Right  Narrative: EXAMINATION:  XR SHOULDER COMPLETE 2 OR MORE VIEWS RIGHT    CLINICAL HISTORY:  XR SHOULDER COMPLETE 2 OR MORE VIEWS RIGHTPain in right shoulder    COMPARISON:  None    FINDINGS:  Three views of the right shoulder were obtained.    No evidence of acute fracture or dislocation.  Bony mineralization is normal.  Soft tissues are unremarkable.   Mild irregularity and calcification along the rotator cuff insertion could reflect calcific tendinosis.  MRI can be obtained as clinically warranted.  Impression: No acute fracture or dislocation.  See above    Electronically signed by: Eric Colon MD  Date:    08/14/2023  Time:    09:57      Relevant imaging results were reviewed and interpreted by me and per my read:  Chronic degenerative changes about the right shoulder, with degenerative changes about the glenohumeral and acromioclavicular joints.  Small calcific density in the distal supraspinatus tendon near its insertion.  Cortical irregularity about the greater tubercle, consistent with chronic rotator cuff tendinopathy.  Normal alignment overall.  No fractures or other acute abnormalities.    This was discussed with the patient and / or family today.     Patient Instructions   Assessment:  Jorge Jones is a 58 y.o. female with a chief complaint of Pain of the Right Shoulder    Encounter  Diagnoses   Name Primary?    Tendinopathy of right rotator cuff Yes    Calcific tendinitis of right shoulder     Subacromial impingement of right shoulder     Chronic right shoulder pain     Type 2 diabetes mellitus with other circulatory complication, with long-term current use of insulin     Essential hypertension     Coronary artery disease involving coronary bypass graft of native heart without angina pectoris       Plan:  XR reviewed - small calcific density in the rotator cuff tendon, as well as degenerative changes about the shoulder  Labs reviewed - A1c 8.2%, Cr 0.63, GFR > 101, LFTs WNL  History and clinical exam is consistent with chronic right shoulder pain due to rotator cuff tendinopathy, calcific tendonitis, and subacromial impingement.  She does not appear to large, high-grade rotator cuff injury/tear.  Diagnosis, treatment options, prognosis discussed in detail.  Defer NSAIDs at this time given coronary artery disease, s/p CABG, and currently undergoing treatment for complications/infection, with upcoming surgery later this week  May have benefit for corticosteroid injection in the future, however contraindicated at this time in the setting of uncontrolled diabetes with recent A1c 8.2%, as well as planned surgery later this week and currently uncontrolled high blood pressure; given risks for glucose and blood pressure elevation following steroid, as well as risk for postoperative healing complications and infection following corticosteroid injection.  Okay to continue over-the-counter Tylenol, as needed for pain.  Home exercise program for the shoulder provided today, can start today and continue up until surgery, as well as postoperatively, as tolerated.  At least 15 minutes were spent developing, teaching, and performing a home exercise program under the direction of Gaudencio Zuniga MD.  A written summary was provided and all questions were answered.  We will refer for formal physical therapy at  [facility] to commence once she has recovered from her surgery in his feeling well enough to it, and has been cleared by Cardiology  Will continue to monitor, may consider for corticosteroid injection if pain persists despite these efforts, and we can get under better glycemic and blood pressure control and no further surgeries planned    Follow-up: 6 weeks or sooner if there are any problems between now and then.    Thank you for choosing [facility] and Dr. Gaudencio Zuniga for your orthopedic & sports medicine care. It is our goal to provide you with exceptional care that will help keep you healthy, active, and get you back in the game.    Please do not hesitate to reach out to us via email, phone, or MyChart with any questions, concerns, or feedback.    If you are experiencing pain/discomfort ,or have questions after 5pm and would like to be connected to the [facility] on-call team, please call this number and specify which Sports Medicine provider is treating you: [phone]      A copy of today's visit note has been sent to the referring provider.           Gaudencio Zuniga MD  Primary Care Sports Medicine    Disclaimer: This note was prepared using a voice recognition system and is likely to have sound alike errors within the text.

## 2023-08-14 NOTE — PATIENT INSTRUCTIONS
Assessment:  Jorge Jones is a 58 y.o. female with a chief complaint of Pain of the Right Shoulder    Encounter Diagnoses   Name Primary?    Tendinopathy of right rotator cuff Yes    Calcific tendinitis of right shoulder     Subacromial impingement of right shoulder     Chronic right shoulder pain     Type 2 diabetes mellitus with other circulatory complication, with long-term current use of insulin     Essential hypertension     Coronary artery disease involving coronary bypass graft of native heart without angina pectoris       Plan:  XR reviewed - small calcific density in the rotator cuff tendon, as well as degenerative changes about the shoulder  Labs reviewed - A1c 8.2%, Cr 0.63, GFR > 101, LFTs WNL  History and clinical exam is consistent with chronic right shoulder pain due to rotator cuff tendinopathy, calcific tendonitis, and subacromial impingement.  She does not appear to large, high-grade rotator cuff injury/tear.  Diagnosis, treatment options, prognosis discussed in detail.  Defer NSAIDs at this time given coronary artery disease, s/p CABG, and currently undergoing treatment for complications/infection, with upcoming surgery later this week  May have benefit for corticosteroid injection in the future, however contraindicated at this time in the setting of uncontrolled diabetes with recent A1c 8.2%, as well as planned surgery later this week and currently uncontrolled high blood pressure; given risks for glucose and blood pressure elevation following steroid, as well as risk for postoperative healing complications and infection following corticosteroid injection.  Okay to continue over-the-counter Tylenol, as needed for pain.  Home exercise program for the shoulder provided today, can start today and continue up until surgery, as well as postoperatively, as tolerated.  At least 10 minutes were spent developing, teaching, and performing a home exercise program under the direction of Gaudencio Zuniga MD.   A written summary was provided and all questions were answered.  We will refer for formal physical therapy at [facility] to commence once she has recovered from her surgery in his feeling well enough to it, and has been cleared by Cardiology  Will continue to monitor, may consider for corticosteroid injection if pain persists despite these efforts, and we can get under better glycemic and blood pressure control and no further surgeries planned    Follow-up: 6 weeks or sooner if there are any problems between now and then.    Thank you for choosing [facility] and Dr. Gaudencio Zuniga for your orthopedic & sports medicine care. It is our goal to provide you with exceptional care that will help keep you healthy, active, and get you back in the game.    Please do not hesitate to reach out to us via email, phone, or MyChart with any questions, concerns, or feedback.    If you are experiencing pain/discomfort ,or have questions after 5pm and would like to be connected to the [facility] on-call team, please call this number and specify which Sports Medicine provider is treating you: [phone]

## 2023-08-15 ENCOUNTER — OFFICE VISIT (OUTPATIENT)
Dept: PODIATRY | Facility: CLINIC | Age: 58
End: 2023-08-15
Payer: COMMERCIAL

## 2023-08-15 VITALS — WEIGHT: 205 LBS | BODY MASS INDEX: 35 KG/M2 | HEIGHT: 64 IN

## 2023-08-15 DIAGNOSIS — Z79.4 TYPE 2 DIABETES MELLITUS WITH OTHER SPECIFIED COMPLICATION, WITH LONG-TERM CURRENT USE OF INSULIN: ICD-10-CM

## 2023-08-15 DIAGNOSIS — L84 CALLUS OF FOOT: ICD-10-CM

## 2023-08-15 DIAGNOSIS — E11.69 TYPE 2 DIABETES MELLITUS WITH OTHER SPECIFIED COMPLICATION, WITH LONG-TERM CURRENT USE OF INSULIN: ICD-10-CM

## 2023-08-15 DIAGNOSIS — Q82.8 POROKERATOSIS: Primary | ICD-10-CM

## 2023-08-15 PROCEDURE — 99999 PR PBB SHADOW E&M-EST. PATIENT-LVL IV: CPT | Mod: PBBFAC,,, | Performed by: PODIATRIST

## 2023-08-15 PROCEDURE — 99203 OFFICE O/P NEW LOW 30 MIN: CPT | Mod: S$GLB,,, | Performed by: PODIATRIST

## 2023-08-15 PROCEDURE — 3061F PR NEG MICROALBUMINURIA RESULT DOCUMENTED/REVIEW: ICD-10-PCS | Mod: CPTII,S$GLB,, | Performed by: PODIATRIST

## 2023-08-15 PROCEDURE — 1159F PR MEDICATION LIST DOCUMENTED IN MEDICAL RECORD: ICD-10-PCS | Mod: CPTII,S$GLB,, | Performed by: PODIATRIST

## 2023-08-15 PROCEDURE — 4010F ACE/ARB THERAPY RXD/TAKEN: CPT | Mod: CPTII,S$GLB,, | Performed by: PODIATRIST

## 2023-08-15 PROCEDURE — 3052F PR MOST RECENT HEMOGLOBIN A1C LEVEL 8.0 - < 9.0%: ICD-10-PCS | Mod: CPTII,S$GLB,, | Performed by: PODIATRIST

## 2023-08-15 PROCEDURE — 3008F BODY MASS INDEX DOCD: CPT | Mod: CPTII,S$GLB,, | Performed by: PODIATRIST

## 2023-08-15 PROCEDURE — 99999 PR PBB SHADOW E&M-EST. PATIENT-LVL IV: ICD-10-PCS | Mod: PBBFAC,,, | Performed by: PODIATRIST

## 2023-08-15 PROCEDURE — G0179 MD RECERTIFICATION HHA PT: HCPCS | Mod: ,,, | Performed by: FAMILY MEDICINE

## 2023-08-15 PROCEDURE — 3066F PR DOCUMENTATION OF TREATMENT FOR NEPHROPATHY: ICD-10-PCS | Mod: CPTII,S$GLB,, | Performed by: PODIATRIST

## 2023-08-15 PROCEDURE — 4010F PR ACE/ARB THEARPY RXD/TAKEN: ICD-10-PCS | Mod: CPTII,S$GLB,, | Performed by: PODIATRIST

## 2023-08-15 PROCEDURE — 1159F MED LIST DOCD IN RCRD: CPT | Mod: CPTII,S$GLB,, | Performed by: PODIATRIST

## 2023-08-15 PROCEDURE — 3008F PR BODY MASS INDEX (BMI) DOCUMENTED: ICD-10-PCS | Mod: CPTII,S$GLB,, | Performed by: PODIATRIST

## 2023-08-15 PROCEDURE — G0179 PR HOME HEALTH MD RECERTIFICATION: ICD-10-PCS | Mod: ,,, | Performed by: FAMILY MEDICINE

## 2023-08-15 PROCEDURE — 3061F NEG MICROALBUMINURIA REV: CPT | Mod: CPTII,S$GLB,, | Performed by: PODIATRIST

## 2023-08-15 PROCEDURE — 99203 PR OFFICE/OUTPT VISIT, NEW, LEVL III, 30-44 MIN: ICD-10-PCS | Mod: S$GLB,,, | Performed by: PODIATRIST

## 2023-08-15 PROCEDURE — 3052F HG A1C>EQUAL 8.0%<EQUAL 9.0%: CPT | Mod: CPTII,S$GLB,, | Performed by: PODIATRIST

## 2023-08-15 PROCEDURE — 3066F NEPHROPATHY DOC TX: CPT | Mod: CPTII,S$GLB,, | Performed by: PODIATRIST

## 2023-08-15 NOTE — PROGRESS NOTES
PODIATRIC MEDICINE AND SURGERY        CHIEF COMPLAINT   Chief Complaint   Patient presents with    Callouses     C/o callus plantar aspect of the right foot, x several years, rates pain 2/10, diabetic, wears sandals, last seen PCP Dr. Tripp on 08/10/23         HPI:    Jorge Jones is a 58 y.o. female presenting to podiatry clinic with complaint of painful lesion on bottom of right foot. Pt has tried OTC treatment without relief. Symptoms are aggravated with pressure and/or ambulation or certain shoewear.  Patient inquires about available treatment options. No further pedal complaints.      Hemoglobin A1C   Date Value Ref Range Status   08/11/2023 8.2 (H) <5.7 % of total Hgb Final     Comment:     For someone without known diabetes, a hemoglobin A1c  value of 6.5% or greater indicates that they may have   diabetes and this should be confirmed with a follow-up   test.     For someone with known diabetes, a value <7% indicates   that their diabetes is well controlled and a value   greater than or equal to 7% indicates suboptimal   control. A1c targets should be individualized based on   duration of diabetes, age, comorbid conditions, and   other considerations.     Currently, no consensus exists regarding use of  hemoglobin A1c for diagnosis of diabetes for children.         12/05/2022 6.9 (H) <5.7 % of total Hgb Final     Comment:     For someone without known diabetes, a hemoglobin A1c  value of 6.5% or greater indicates that they may have   diabetes and this should be confirmed with a follow-up   test.     For someone with known diabetes, a value <7% indicates   that their diabetes is well controlled and a value   greater than or equal to 7% indicates suboptimal   control. A1c targets should be individualized based on   duration of diabetes, age, comorbid conditions, and   other considerations.     Currently, no consensus exists regarding use of  hemoglobin A1c for diagnosis of diabetes for children.        "  09/06/2022 9.0 (H) <5.7 % of total Hgb Final     Comment:     For someone without known diabetes, a hemoglobin A1c  value of 6.5% or greater indicates that they may have   diabetes and this should be confirmed with a follow-up   test.     For someone with known diabetes, a value <7% indicates   that their diabetes is well controlled and a value   greater than or equal to 7% indicates suboptimal   control. A1c targets should be individualized based on   duration of diabetes, age, comorbid conditions, and   other considerations.     Currently, no consensus exists regarding use of  hemoglobin A1c for diagnosis of diabetes for children.             [facility]  Past Medical History:   Diagnosis Date    Arthritis     Coronary artery disease     Diabetes mellitus, type 2     Fibromyalgia     Hyperlipidemia     Hypertension        PROBLEM LIST  There are no problems to display for this patient.      MEDS  Current Outpatient Medications on File Prior to Visit   Medication Sig Dispense Refill    albuterol (VENTOLIN HFA) 90 mcg/actuation inhaler Inhale 2 puffs into the lungs every 6 (six) hours as needed (cough). Rescue inhaler. 18 g 3    aspirin (ECOTRIN) 81 MG EC tablet Take 81 mg by mouth.      BD ULTRA-FINE SHORT PEN NEEDLE 31 gauge x 5/16" Ndle Inject 1 Syringe into the skin.      clopidogreL (PLAVIX) 75 mg tablet Take 75 mg by mouth.      DULoxetine (CYMBALTA) 30 MG capsule Take 1 capsule (30 mg total) by mouth once daily. 90 capsule 3    furosemide (LASIX) 20 MG tablet Take 20 mg by mouth once daily.      hydrOXYzine HCL (ATARAX) 25 MG tablet Take 1 tablet (25 mg total) by mouth 3 (three) times daily as needed for Itching. 30 tablet 2    insulin (BASAGLAR KWIKPEN U-100 INSULIN) glargine 100 units/mL SubQ pen Inject 16 Units into the skin once daily. 15 mL 3    metFORMIN (GLUCOPHAGE-XR) 500 MG ER 24hr tablet Take 2 tablets (1,000 mg total) by mouth 2 (two) times daily with meals. 360 tablet 3    metoprolol succinate " (TOPROL-XL) 100 MG 24 hr tablet Take 100 mg by mouth.      olmesartan-hydrochlorothiazide (BENICAR HCT) 20-12.5 mg per tablet Take 1 tablet by mouth.      omega-3 fatty acids/fish oil (FISH OIL-OMEGA-3 FATTY ACIDS) 300-1,000 mg capsule Take 2 g by mouth.      pantoprazole (PROTONIX) 40 MG tablet Take 40 mg by mouth.      pravastatin (PRAVACHOL) 20 MG tablet Take 1 tablet (20 mg total) by mouth once daily. 90 tablet 3    semaglutide (OZEMPIC) 0.25 mg or 0.5 mg (2 mg/3 mL) pen injector Inject 0.5 mg into the skin every 7 days. 9 mL 3    vitamin D (VITAMIN D3) 1000 units Tab Take 1,000 Units by mouth once daily.       No current facility-administered medications on file prior to visit.       PSH     Past Surgical History:   Procedure Laterality Date    CORONARY ARTERY BYPASS GRAFT      TONSILLECTOMY      TUBAL LIGATION          ALL  Review of patient's allergies indicates:   Allergen Reactions    Cefuroxime Hives     No reaction to Keflex and PCN      Sulfamethoxazole-trimethoprim Hives and Nausea And Vomiting    Cefuroxime axetil Hives and Nausea And Vomiting    Empagliflozin Nausea And Vomiting    Sulfamethoxazole Hives    Trimethoprim Hives       SOC     Social History     Tobacco Use    Smoking status: Never     Passive exposure: Past    Smokeless tobacco: Never   Substance Use Topics    Alcohol use: Not Currently    Drug use: Never         Family HX    Family History   Problem Relation Age of Onset    Diabetes Mother     Hyperlipidemia Mother     Heart attack Mother     Diabetes Father     Hyperlipidemia Father             REVIEW OF SYSTEMS  General: Denies any fever or chills  Chest: Denies shortness of breath, wheezing, coughing, or sputum production  Heart: Denies chest pain, cold extremities, orthopenia, or reduced exercise tolerance  As noted above and per history of current illness above, otherwise negative in the remainder of the 14 systems.      PHYSICAL EXAM:      Vitals:    08/15/23 0832   Weight: 93 kg  "(205 lb)   Height: 5' 4" (1.626 m)   PainSc:   2       General: This patient is well-developed, well-nourished and appears stated age, well-oriented to person, place and time, and cooperative and pleasant on today's visit    LOWER EXTREMITY PHYSICAL EXAM  VASCULAR  Dorsalis pedis and posterior tibial pulses palpable 2/4 bilaterally.   Capillary refill time immediate to the toes.   Feet are warm to the touch. Skin temperature warm to warm from proximally to distally   There are no varicosities, telangiectasias noted to bilateral foot and ankle regions.   There are no ecchymoses noted to bilateral foot and ankle regions.   There is no gross lower extremity edema.    DERMATOLOGIC  Skin moist with healthy texture and turgor.  There are no open ulcerations, lacerations, or fissures to bilateral foot and ankle regions. There are no signs of infection as there is no erythema, no proximal-extending lymphangiitis, no fluctuance, or crepitus noted on palpation to bilateral foot and ankle regions.   There is no interdigital maceration.   There are hyperkeratotic lesions noted to sub 5th metatarsal head right foot.    NEUROLOGIC  Epicritic sensation is intact as the patient is able to sense light touch to bilateral foot and ankle regions.   Achilles and patellar deep tendon reflexes intact  Babinski reflex absent    ORTHOPEDIC/BIOMECHANICAL  Pain with palpation of above noted lesion   Muscle strength AT/EHL/EDL/PT: 5/5; Achilles/Gastroc/Soleus: 5/5; PB/PL: 5/5 Muscle tone is normal.  Ankle joint ROM INTACT DF/PF, non-crepitus      ASSESSMENT   Type 2 diabetes mellitus with other specified complication, with long-term current use of insulin  -     Ambulatory referral/consult to Podiatry    Callus of foot  -     Ambulatory referral/consult to Podiatry        PLAN    1. Patient was educated about clinical and imaging findings, and verbalizes understanding of above.      -With patient's permission via verbal consent, the involved " area was cleansed with an alcohol swab. Trimming of hyperkeratotic lesions deep to epidermal layer x 1 was performed with a #15 blade without incident. Patient relates relief following the procedure. Patient will continue to monitor the areas daily, inspect feet, wear protective shoe gear when ambulatory, moisturizer to maintain skin integrity.    -Rx for Urea 40 /Amlactin for calluses, metatarsal pad dispensed and applied for offloading callus. Shoe recommendations provided for accomodative foot wear.          Report Electronically Signed By:     Tenisha Young DPM   Podiatry  [facility]  8/15/2023

## 2023-08-15 NOTE — PATIENT INSTRUCTIONS
In order to moisturize your heels/bottom of foot, I recommend to purchase one of the below moisturizers that work well with dry skin/calluses. You should apply to bottom of your feet twice daily for best results.     You can purchase UREA 40% cream online: www.1-800-DOCTORS       [employer]   Urea 40% Foot Cream 4 oz - Best Callus Remover - Moisturizes & Rehydrates Thick, Cracked, Rough, Dead & Dry Skin - For Feet, Elbows and Hands + Free Pumice Stone - 100% Money Back Guarantee   4.6 out of 5 stars 1,181   $14.99 Prime    OR   You can also purchase Flexitol heel balm cream. This can be found at Mr. Number or also online www.1-800-DOCTORS

## 2023-08-16 ENCOUNTER — TELEPHONE (OUTPATIENT)
Dept: PRIMARY CARE CLINIC | Facility: CLINIC | Age: 58
End: 2023-08-16
Payer: COMMERCIAL

## 2023-08-16 ENCOUNTER — PATIENT OUTREACH (OUTPATIENT)
Dept: ADMINISTRATIVE | Facility: HOSPITAL | Age: 58
End: 2023-08-16
Payer: COMMERCIAL

## 2023-08-16 NOTE — TELEPHONE ENCOUNTER
Spoke with pt.  Pt is concerned with recent lab results in relation to her going into surgery tomorrow.  Would like pcp advice on if lab results make surgery contraindicated for pt.   [>50% of Time Spent on Counseling for ____] : Greater than 50% of the encounter time was spent on counseling for [unfilled] [Time Spent: ___ minutes] : I have spent [unfilled] minutes of face to face time with the patient

## 2023-08-16 NOTE — TELEPHONE ENCOUNTER
----- Message from Alexa Milan MA sent at 8/16/2023 10:43 AM CDT -----  Contact: elise  Type:  Test Results    Who Called: elise  Name of Test (Lab/Mammo/Etc): labs/orders   Date of Test: 8/11  Ordering Provider:  dr frazier  Where the test was performed: [facility]   Would the patient rather a call back or a response via [facility]? Call back   Best Call Back Number: [phone] (home)     Additional Information:  n/a

## 2023-08-22 LAB
LEFT EYE DM RETINOPATHY: NEGATIVE
RIGHT EYE DM RETINOPATHY: NEGATIVE

## 2023-09-01 DIAGNOSIS — Z79.4 TYPE 2 DIABETES MELLITUS WITH OTHER SPECIFIED COMPLICATION, WITH LONG-TERM CURRENT USE OF INSULIN: Primary | ICD-10-CM

## 2023-09-01 DIAGNOSIS — E11.69 TYPE 2 DIABETES MELLITUS WITH OTHER SPECIFIED COMPLICATION, WITH LONG-TERM CURRENT USE OF INSULIN: Primary | ICD-10-CM

## 2023-09-01 NOTE — PROGRESS NOTES
Ms. Patel,    Most of your recent blood work looks fine.  Blood counts are okay.  Electrolytes, kidney function, liver function, and thyroid function are all fine.  Your cholesterol levels are perfect.  You were negative for HIV.    Sugar, however, has gone up a bit.  Random glucose was not bad, it was 141.  Much better than a year ago when it was 209.  A1c has also gone.  Now, it is at 8.2%.      Our goal is to get you below 7%.    At our last visit, we stopped the Synjardy, but restarted the metformin, started Ozempic, and continue the Lantus.  How are you doing on these?    We will be rechecking your A1c prior to next visit, but I really suspect that the Ozempic will bring your numbers back into good range.  I am going to send an order to [facility] to recheck your A1c.  Feel free to go there anytime after November 1 to get the blood drawn.  That way, we can discuss the results together on November 10 at your checkup.

## 2023-09-07 ENCOUNTER — EXTERNAL HOME HEALTH (OUTPATIENT)
Dept: HOME HEALTH SERVICES | Facility: HOSPITAL | Age: 58
End: 2023-09-07
Payer: COMMERCIAL

## 2023-09-25 ENCOUNTER — PATIENT OUTREACH (OUTPATIENT)
Dept: ADMINISTRATIVE | Facility: HOSPITAL | Age: 58
End: 2023-09-25
Payer: COMMERCIAL

## 2023-09-25 NOTE — PROGRESS NOTES
Working  HTN Report.    Requested an updated bp reading. States she is not at home. Agreed to check it latter and I will call tomorrow for bp.

## 2023-09-27 NOTE — PROGRESS NOTES
Requested an updated bp reading.   States she checked it 3 times the other day. Said the first time it was in the 150's, before taking her medication. Checked it later that afternoon and it was higher and then again around 9 pm and it was still higher. She agreed to nurse bp check, 9/28/23. She said she would be calling her cardiologist for further recommendations.

## 2023-09-28 ENCOUNTER — CLINICAL SUPPORT (OUTPATIENT)
Dept: PRIMARY CARE CLINIC | Facility: CLINIC | Age: 58
End: 2023-09-28
Payer: COMMERCIAL

## 2023-09-28 VITALS — SYSTOLIC BLOOD PRESSURE: 130 MMHG | DIASTOLIC BLOOD PRESSURE: 82 MMHG | HEART RATE: 88 BPM

## 2023-09-28 DIAGNOSIS — I10 HYPERTENSION, UNSPECIFIED TYPE: Primary | ICD-10-CM

## 2023-09-28 PROCEDURE — 99999 PR PBB SHADOW E&M-EST. PATIENT-LVL I: CPT | Mod: PBBFAC,,,

## 2023-09-28 PROCEDURE — 99999 PR PBB SHADOW E&M-EST. PATIENT-LVL I: ICD-10-PCS | Mod: PBBFAC,,,

## 2023-09-28 NOTE — PROGRESS NOTES
Pt in today for a b/p check . Pts b/p 130/82 HR-88. No distress noted at this time. I advised pt to continue taking all prescribe meds. Pt verbalized understanding.

## 2023-10-06 DIAGNOSIS — E11.69 TYPE 2 DIABETES MELLITUS WITH OTHER SPECIFIED COMPLICATION, WITH LONG-TERM CURRENT USE OF INSULIN: ICD-10-CM

## 2023-10-06 DIAGNOSIS — Z79.4 TYPE 2 DIABETES MELLITUS WITH OTHER SPECIFIED COMPLICATION, WITH LONG-TERM CURRENT USE OF INSULIN: ICD-10-CM

## 2023-10-06 RX ORDER — INSULIN GLARGINE 100 [IU]/ML
16 INJECTION, SOLUTION SUBCUTANEOUS
Qty: 15 ML | Refills: 1 | Status: SHIPPED | OUTPATIENT
Start: 2023-10-06 | End: 2024-01-22 | Stop reason: SDUPTHER

## 2023-10-06 NOTE — TELEPHONE ENCOUNTER
No care due was identified.  [facility] Embedded Care Due Messages. Reference number: [id number].   10/06/2023 12:17:32 PM CDT

## 2023-10-06 NOTE — TELEPHONE ENCOUNTER
Refill Decision Note   Jorge Jones  is requesting a refill authorization.  Brief Assessment and Rationale for Refill:  Approve     Medication Therapy Plan:       Medication Reconciliation Completed: No   Comments:     No Care Gaps recommended.     Note composed:12:19 PM 10/06/2023

## 2023-11-08 LAB — HBA1C MFR BLD: 6.8 % OF TOTAL HGB

## 2023-12-29 ENCOUNTER — TELEPHONE (OUTPATIENT)
Dept: PRIMARY CARE CLINIC | Facility: CLINIC | Age: 58
End: 2023-12-29
Payer: COMMERCIAL

## 2023-12-29 NOTE — TELEPHONE ENCOUNTER
----- Message from Marli Flores sent at 12/29/2023  7:19 AM CST -----  Contact: pt  Type:  Same Day Appointment Request    Caller is requesting a same day appointment.  Caller declined first available appointment listed below.    Name of Caller:pt  When is the first available appointment?  Nothing pulling up in book it  Symptoms: coughing  Best Call Back Number: [phone]  Additional Information:   (no SDA anywhere in [place] providers)

## 2023-12-29 NOTE — TELEPHONE ENCOUNTER
Called and spoke with the pt. Informed the pt to schedule a in person visit on the portal for the 3 day change appt . Pt verbalized and understands

## 2024-01-22 DIAGNOSIS — M79.7 FIBROMYALGIA: ICD-10-CM

## 2024-01-22 DIAGNOSIS — E11.69 TYPE 2 DIABETES MELLITUS WITH OTHER SPECIFIED COMPLICATION, WITH LONG-TERM CURRENT USE OF INSULIN: Primary | ICD-10-CM

## 2024-01-22 DIAGNOSIS — E78.5 HYPERLIPIDEMIA, UNSPECIFIED HYPERLIPIDEMIA TYPE: ICD-10-CM

## 2024-01-22 DIAGNOSIS — Z79.4 TYPE 2 DIABETES MELLITUS WITH OTHER SPECIFIED COMPLICATION, WITH LONG-TERM CURRENT USE OF INSULIN: Primary | ICD-10-CM

## 2024-01-22 RX ORDER — OLMESARTAN MEDOXOMIL AND HYDROCHLOROTHIAZIDE 40/25 40; 25 MG/1; MG/1
1 TABLET ORAL DAILY
Qty: 90 TABLET | Refills: 3 | Status: SHIPPED | OUTPATIENT
Start: 2024-01-22 | End: 2025-01-21

## 2024-01-22 RX ORDER — METFORMIN HYDROCHLORIDE 500 MG/1
1000 TABLET, EXTENDED RELEASE ORAL 2 TIMES DAILY WITH MEALS
Qty: 360 TABLET | Refills: 3 | Status: SHIPPED | OUTPATIENT
Start: 2024-01-22 | End: 2025-01-21

## 2024-01-22 RX ORDER — INSULIN GLARGINE 100 [IU]/ML
20 INJECTION, SOLUTION SUBCUTANEOUS NIGHTLY
Qty: 18 ML | Refills: 3 | Status: SHIPPED | OUTPATIENT
Start: 2024-01-22 | End: 2025-01-21

## 2024-01-22 RX ORDER — DULOXETIN HYDROCHLORIDE 30 MG/1
30 CAPSULE, DELAYED RELEASE ORAL DAILY
Qty: 90 CAPSULE | Refills: 3 | Status: SHIPPED | OUTPATIENT
Start: 2024-01-22

## 2024-01-22 RX ORDER — CLOPIDOGREL BISULFATE 75 MG/1
75 TABLET ORAL DAILY
Qty: 90 TABLET | Refills: 3 | Status: SHIPPED | OUTPATIENT
Start: 2024-01-22

## 2024-01-22 RX ORDER — AMLODIPINE BESYLATE 5 MG/1
5 TABLET ORAL NIGHTLY
Qty: 90 TABLET | Refills: 3 | Status: SHIPPED | OUTPATIENT
Start: 2024-01-22

## 2024-01-22 RX ORDER — PANTOPRAZOLE SODIUM 40 MG/1
40 TABLET, DELAYED RELEASE ORAL DAILY
Qty: 90 TABLET | Refills: 3 | Status: SHIPPED | OUTPATIENT
Start: 2024-01-22

## 2024-01-22 RX ORDER — METOPROLOL SUCCINATE 100 MG/1
100 TABLET, EXTENDED RELEASE ORAL NIGHTLY
Qty: 90 TABLET | Refills: 3 | Status: SHIPPED | OUTPATIENT
Start: 2024-01-22

## 2024-01-22 RX ORDER — PRAVASTATIN SODIUM 20 MG/1
20 TABLET ORAL DAILY
Qty: 90 TABLET | Refills: 3 | Status: SHIPPED | OUTPATIENT
Start: 2024-01-22

## 2024-01-23 ENCOUNTER — TELEPHONE (OUTPATIENT)
Dept: PRIMARY CARE CLINIC | Facility: CLINIC | Age: 59
End: 2024-01-23
Payer: COMMERCIAL

## 2024-01-28 LAB
HBA1C MFR BLD: 7.3 % OF TOTAL HGB
HIV 1+2 AB+HIV1 P24 AG SERPL QL IA: NORMAL

## 2024-01-30 ENCOUNTER — OFFICE VISIT (OUTPATIENT)
Dept: PRIMARY CARE CLINIC | Facility: CLINIC | Age: 59
End: 2024-01-30
Payer: COMMERCIAL

## 2024-01-30 VITALS
WEIGHT: 212.31 LBS | HEART RATE: 83 BPM | HEIGHT: 64 IN | BODY MASS INDEX: 36.25 KG/M2 | SYSTOLIC BLOOD PRESSURE: 118 MMHG | DIASTOLIC BLOOD PRESSURE: 74 MMHG | TEMPERATURE: 98 F

## 2024-01-30 DIAGNOSIS — I10 HYPERTENSION, UNSPECIFIED TYPE: ICD-10-CM

## 2024-01-30 DIAGNOSIS — M79.7 FIBROMYALGIA: ICD-10-CM

## 2024-01-30 DIAGNOSIS — E78.5 HYPERLIPIDEMIA, UNSPECIFIED HYPERLIPIDEMIA TYPE: ICD-10-CM

## 2024-01-30 DIAGNOSIS — Z79.4 TYPE 2 DIABETES MELLITUS WITH OTHER SPECIFIED COMPLICATION, WITH LONG-TERM CURRENT USE OF INSULIN: ICD-10-CM

## 2024-01-30 DIAGNOSIS — Z12.4 CERVICAL CANCER SCREENING: ICD-10-CM

## 2024-01-30 DIAGNOSIS — Z12.31 BREAST CANCER SCREENING BY MAMMOGRAM: ICD-10-CM

## 2024-01-30 DIAGNOSIS — Z00.00 ANNUAL PHYSICAL EXAM: Primary | ICD-10-CM

## 2024-01-30 DIAGNOSIS — E11.69 TYPE 2 DIABETES MELLITUS WITH OTHER SPECIFIED COMPLICATION, WITH LONG-TERM CURRENT USE OF INSULIN: ICD-10-CM

## 2024-01-30 DIAGNOSIS — I25.810 CORONARY ARTERY DISEASE INVOLVING CORONARY BYPASS GRAFT OF NATIVE HEART WITHOUT ANGINA PECTORIS: ICD-10-CM

## 2024-01-30 PROCEDURE — 3078F DIAST BP <80 MM HG: CPT | Mod: CPTII,S$GLB,, | Performed by: FAMILY MEDICINE

## 2024-01-30 PROCEDURE — 1159F MED LIST DOCD IN RCRD: CPT | Mod: CPTII,S$GLB,, | Performed by: FAMILY MEDICINE

## 2024-01-30 PROCEDURE — 99396 PREV VISIT EST AGE 40-64: CPT | Mod: S$GLB,,, | Performed by: FAMILY MEDICINE

## 2024-01-30 PROCEDURE — 3008F BODY MASS INDEX DOCD: CPT | Mod: CPTII,S$GLB,, | Performed by: FAMILY MEDICINE

## 2024-01-30 PROCEDURE — 3074F SYST BP LT 130 MM HG: CPT | Mod: CPTII,S$GLB,, | Performed by: FAMILY MEDICINE

## 2024-01-30 PROCEDURE — 99999 PR PBB SHADOW E&M-EST. PATIENT-LVL V: CPT | Mod: PBBFAC,,, | Performed by: FAMILY MEDICINE

## 2024-01-30 PROCEDURE — 3051F HG A1C>EQUAL 7.0%<8.0%: CPT | Mod: CPTII,S$GLB,, | Performed by: FAMILY MEDICINE

## 2024-01-30 RX ORDER — PRAVASTATIN SODIUM 20 MG/1
TABLET ORAL
COMMUNITY
End: 2024-01-30

## 2024-01-30 NOTE — PATIENT INSTRUCTIONS
Overall, everything looks pretty good today.      A1c is a little higher than we would like at 7.3%.  Our goal is to get it below 7%.  Continue to focus on diet and exercise over the next six months.      Let us not make any changes to your medication at this time.  Instead, let us see about making them more affordable.  I am re sending the Ozempic prescription to [facility], and I put the savings card info on the prescription.  Bring the printed sheet with you and make sure they type in that information.  It should bring your co-pay down to $25.  When you are at the pharmacy, ask them if coupon will let them do a 90 day supply for $25 instead?    Continue all of your other medications, as prescribed.  [facility]      We are due for cervical and breast cancer screening.  Order for mammogram placed today.  I also placed a referral to get you established with Ms. Hawk, our gyn NP, for well-woman exam and Pap.  She comes here every Wednesday.  We can schedule the mammogram the same day as that visit.      Continue to eat a healthy diet.  Be careful with portion sizes.  Includes lots of fresh fruits, vegetables, whole grains, lean proteins.  See info below.    Keep hydrated.  Be sure to drink at least 8-10, 8 oz, glasses of water every day.    Stay active.  Try to do some sort of physical activity every day.  Nothing outrageous, just try walking for 10-15 minutes each day.

## 2024-01-30 NOTE — PROGRESS NOTES
"    [facility]       [address] [place] [address]      Phone: [phone]      Fax: [phone]    Ahsan Tripp MD                Office Visit  01/30/2024        Subjective      HPI:  Jorge Jones is a 58 y.o. female presents today in clinic for "Follow-up  ."     58-year-old female presents today for annual wellness exam.    Overall, feels good today.  No acute complaints.  No chest pains, shortness O breath.  No fever, chills, body aches.  No coughing, sneezing, URI type symptoms.  Appetite normal.  Bowel movements normal.  No urinary issues.      Has diabetes.  In the past, was taking Synjardy, Lantus.  Synjardy caused her urinary issues, so we discontinued it.  Instead, she takes Basaglar 20 units daily, along with metformin 1000 mg twice a day, and Ozempic 0.5 mg weekly.  Tolerating these medications quite well.  A1c still slightly above goal at 7.3%.  It has been fluctuating over the last year.  Last August it was 8.2%.  Then, came down to 6.8% in November.  Now, back to 7.3%.  They did splurge quite a bit during holidays.  Has been trying to get refocused on diet, exercise.    She does state that the medication has gotten expensive.  Ozempic is costing approximately $100+ her month.   is paying $400+ for a three-month supply.  His other medications are also expensive.  Now that he is on Medicare, prices appear to have changed.  She is still on commercial insurance.    Has hypertension.  Also has CAD/CABG.  HLD.  Currently takes amlodipine 5 mg daily, along with Benicar HCT 40-25 mg daily.  Takes pravastatin 20 mg for cholesterol.  Cardiologist also has her on Toprol- mg daily and baby aspirin plus Plavix.  States that later this year he may be stopping the Plavix since it will be one year after her surgery date.    Has fibromyalgia.  Was having some issues with her shoulder.  Feels like it is doing much better.    PMH:  Dm, HTN, CAD, " "HLD, LAURIE, fibromyalgia  PSH:  Tonsils, BTL  FMH:  Dm, HLD, HTN, CAD/mi, AFib  Allergies:  Ceftin causes hives.  Bactrim/sulfa also causes hives.  Social:  Stays at home.  .  Denies T/a/D    Exercise:  No regular exercise program.  Has a stationary bike, but is limited due to knee pains.    Ortho:  Dr. Hood at [facility]  Eyes:  [place]  Cardiology:  Dr. Vipin Mercedes  Gyn:  Dr. Jami Brewer at [facility]  (she retired at the end of December.  Needs to get reestablished with someone new.  Would prefer to be someone out here. )    Colon: 2015. Repeat in 10 yrs ()    Pap: 10/22/2019. Due in   MM23    **Labs at [facility]**        The following were updated and reviewed by myself in the chart: medications, past medical history, past surgical history, family history, social history, and allergies.     Medications:  Current Outpatient Medications on File Prior to Visit   Medication Sig Dispense Refill    amLODIPine (NORVASC) 5 MG tablet Take 1 tablet (5 mg total) by mouth every evening. 90 tablet 3    aspirin (ECOTRIN) 81 MG EC tablet Take 81 mg by mouth.      BD ULTRA-FINE SHORT PEN NEEDLE 31 gauge x 5/16" Ndle Inject 1 Syringe into the skin.      clopidogreL (PLAVIX) 75 mg tablet Take 1 tablet (75 mg total) by mouth once daily. 90 tablet 3    DULoxetine (CYMBALTA) 30 MG capsule Take 1 capsule (30 mg total) by mouth once daily. 90 capsule 3    insulin (BASAGLAR KWIKPEN U-100 INSULIN) glargine 100 units/mL SubQ pen Inject 20 Units into the skin every evening. 18 mL 3    metFORMIN (GLUCOPHAGE-XR) 500 MG ER 24hr tablet Take 2 tablets (1,000 mg total) by mouth 2 (two) times daily with meals. 360 tablet 3    metoprolol succinate (TOPROL-XL) 100 MG 24 hr tablet Take 1 tablet (100 mg total) by mouth every evening. 90 tablet 3    olmesartan-hydrochlorothiazide (BENICAR HCT) 40-25 mg per tablet Take 1 tablet by mouth once daily. 90 tablet 3    omega-3 fatty acids/fish oil (FISH OIL-OMEGA-3 FATTY ACIDS) " 300-1,000 mg capsule Take 2 g by mouth.      pantoprazole (PROTONIX) 40 MG tablet Take 1 tablet (40 mg total) by mouth once daily. 90 tablet 3    pravastatin (PRAVACHOL) 20 MG tablet Take 1 tablet (20 mg total) by mouth once daily. 90 tablet 3    vitamin D (VITAMIN D3) 1000 units Tab Take 1,000 Units by mouth once daily.      [DISCONTINUED] pravastatin (PRAVACHOL) 20 MG tablet Pravastatin Sodium      [DISCONTINUED] semaglutide (OZEMPIC) 0.25 mg or 0.5 mg (2 mg/3 mL) pen injector Inject 0.5 mg into the skin every 7 days. 9 mL 3    [DISCONTINUED] albuterol (VENTOLIN HFA) 90 mcg/actuation inhaler Inhale 2 puffs into the lungs every 6 (six) hours as needed (cough). Rescue inhaler. 18 g 3    [DISCONTINUED] furosemide (LASIX) 20 MG tablet Take 20 mg by mouth once daily.      [DISCONTINUED] hydrOXYzine HCL (ATARAX) 25 MG tablet Take 1 tablet (25 mg total) by mouth 3 (three) times daily as needed for Itching. 30 tablet 2     No current facility-administered medications on file prior to visit.        PMHx:  Past Medical History:   Diagnosis Date    Arthritis     Coronary artery disease     Diabetes mellitus, type 2     Fibromyalgia     Hyperlipidemia     Hypertension       There are no problems to display for this patient.       PSHx:  Past Surgical History:   Procedure Laterality Date    CORONARY ARTERY BYPASS GRAFT      TONSILLECTOMY      TUBAL LIGATION          FHx:  Family History   Problem Relation Age of Onset    Diabetes Mother     Hyperlipidemia Mother     Heart attack Mother     Diabetes Father     Hyperlipidemia Father         Social:  Social History     Socioeconomic History    Marital status:    Tobacco Use    Smoking status: Never     Passive exposure: Past    Smokeless tobacco: Never   Substance and Sexual Activity    Alcohol use: Not Currently    Drug use: Never    Sexual activity: Yes     Partners: Male        Allergies:  Review of patient's allergies indicates:   Allergen Reactions    Cefuroxime Hives  "    No reaction to Keflex and PCN      Sulfamethoxazole-trimethoprim Hives and Nausea And Vomiting    Cefuroxime axetil Hives and Nausea And Vomiting    Empagliflozin Nausea And Vomiting    Sulfamethoxazole Hives    Trimethoprim Hives        ROS:  Review of Systems   Constitutional:  Negative for activity change, appetite change, chills and fever.   HENT:  Negative for congestion, postnasal drip, rhinorrhea, sore throat and trouble swallowing.    Respiratory:  Negative for cough, shortness of breath and wheezing.    Cardiovascular:  Negative for chest pain and palpitations.   Gastrointestinal:  Negative for abdominal pain, constipation, diarrhea, nausea and vomiting.   Genitourinary:  Negative for difficulty urinating.   Musculoskeletal:  Negative for arthralgias and myalgias.   Skin:  Negative for color change and rash.   Neurological:  Negative for speech difficulty and headaches.   All other systems reviewed and are negative.         Objective      /74   Pulse 83   Temp 97.7 °F (36.5 °C)   Ht 5' 4" (1.626 m)   Wt 96.3 kg (212 lb 4.9 oz)   BMI 36.44 kg/m²   Ht Readings from Last 3 Encounters:   01/30/24 5' 4" (1.626 m)   08/15/23 5' 4" (1.626 m)   08/10/23 5' 4" (1.626 m)     Wt Readings from Last 3 Encounters:   01/30/24 96.3 kg (212 lb 4.9 oz)   08/15/23 93 kg (205 lb)   08/10/23 93.1 kg (205 lb 4 oz)       PHYSICAL EXAM:  Physical Exam  Vitals and nursing note reviewed.   Constitutional:       General: She is not in acute distress.     Appearance: Normal appearance.   HENT:      Head: Normocephalic and atraumatic.      Right Ear: Tympanic membrane, ear canal and external ear normal.      Left Ear: Tympanic membrane, ear canal and external ear normal.      Nose: Nose normal. No congestion or rhinorrhea.      Mouth/Throat:      Mouth: Mucous membranes are moist.      Pharynx: Oropharynx is clear. No oropharyngeal exudate or posterior oropharyngeal erythema.   Eyes:      Extraocular Movements: " Extraocular movements intact.      Conjunctiva/sclera: Conjunctivae normal.      Pupils: Pupils are equal, round, and reactive to light.   Cardiovascular:      Rate and Rhythm: Normal rate and regular rhythm.   Pulmonary:      Effort: Pulmonary effort is normal. No respiratory distress.      Breath sounds: No wheezing, rhonchi or rales.   Musculoskeletal:         General: Normal range of motion.      Cervical back: Normal range of motion.   Lymphadenopathy:      Cervical: No cervical adenopathy.   Skin:     General: Skin is warm and dry.      Findings: No rash.   Neurological:      Mental Status: She is alert.              LABS / IMAGING:  Recent Results (from the past 4368 hour(s))   CBC Auto Differential    Collection Time: 08/11/23  8:47 AM   Result Value Ref Range    WBC 9.0 3.8 - 10.8 Thousand/uL    RBC 5.31 (H) 3.80 - 5.10 Million/uL    Hemoglobin 13.5 11.7 - 15.5 g/dL    Hematocrit 42.2 35.0 - 45.0 %    MCV 79.5 (L) 80.0 - 100.0 fL    MCH 25.4 (L) 27.0 - 33.0 pg    MCHC 32.0 32.0 - 36.0 g/dL    RDW 13.4 11.0 - 15.0 %    Platelets 498 (H) 140 - 400 Thousand/uL    MPV 8.7 7.5 - 12.5 fL    Neutrophils, Abs 5,076 1,500 - 7,800 cells/uL    Lymph # 3,087 850 - 3,900 cells/uL    Mono # 612 200 - 950 cells/uL    Eos # 171 15 - 500 cells/uL    Baso # 54 0 - 200 cells/uL    Neutrophils Relative 56.4 %    Lymph % 34.3 %    Mono % 6.8 %    Eosinophil % 1.9 %    Basophil % 0.6 %   Comprehensive Metabolic Panel    Collection Time: 08/11/23  8:47 AM   Result Value Ref Range    Glucose 141 (H) 65 - 99 mg/dL    BUN 11 7 - 25 mg/dL    Creatinine 0.63 0.50 - 1.05 mg/dL    eGFR 101 > OR = 60 mL/min/1.73m2    BUN/Creatinine Ratio SEE NOTE: 6 - 22 (calc)    Sodium 142 135 - 146 mmol/L    Potassium 4.3 3.5 - 5.3 mmol/L    Chloride 101 98 - 110 mmol/L    CO2 28 20 - 32 mmol/L    Calcium 9.6 8.6 - 10.4 mg/dL    Total Protein 8.0 6.1 - 8.1 g/dL    Albumin 4.1 3.6 - 5.1 g/dL    Globulin, Total 3.9 (H) 1.9 - 3.7 g/dL (calc)     Albumin/Globulin Ratio 1.1 1.0 - 2.5 (calc)    Total Bilirubin 0.3 0.2 - 1.2 mg/dL    Alkaline Phosphatase 104 37 - 153 U/L    AST 9 (L) 10 - 35 U/L    ALT 6 6 - 29 U/L   TSH    Collection Time: 08/11/23  8:47 AM   Result Value Ref Range    TSH 1.46 0.40 - 4.50 mIU/L   Lipid Panel    Collection Time: 08/11/23  8:47 AM   Result Value Ref Range    Cholesterol 137 <200 mg/dL    HDL 39 (L) > OR = 50 mg/dL    Triglycerides 114 <150 mg/dL    LDL Cholesterol 78 mg/dL (calc)    HDL/Cholesterol Ratio 3.5 <5.0 (calc)    Non HDL Chol. (LDL+VLDL) 98 <130 mg/dL (calc)   Hemoglobin A1C    Collection Time: 08/11/23  8:47 AM   Result Value Ref Range    Hemoglobin A1C 8.2 (H) <5.7 % of total Hgb   HIV 1/2 Ag/Ab (4th Gen)    Collection Time: 08/11/23  8:47 AM   Result Value Ref Range    HIV Ag/Ab 4th Gen NON-REACTIVE NON-REACTIVE   Microalbumin/Creatinine Ratio, Urine    Collection Time: 08/11/23  8:47 AM   Result Value Ref Range    Creatinine, Urine 62 20 - 275 mg/dL    Microalb, Ur 1.7 See Note: mg/dL    Microalb/Creat Ratio 27 <30 mcg/mg creat   HEMOGLOBIN A1C    Collection Time: 11/07/23  7:56 AM   Result Value Ref Range    Hemoglobin A1C 6.8 (H) <5.7 % of total Hgb   HEMOGLOBIN A1C    Collection Time: 01/26/24  8:59 AM   Result Value Ref Range    Hemoglobin A1C 7.3 (H) <5.7 % of total Hgb   HIV 1/2 Ag/Ab (4th Gen)    Collection Time: 01/26/24  8:59 AM   Result Value Ref Range    HIV Ag/Ab 4th Gen NON-REACTIVE NON-REACTIVE         Assessment    1. Annual physical exam    2. Type 2 diabetes mellitus with other specified complication, with long-term current use of insulin    3. Fibromyalgia    4. Hypertension, unspecified type    5. Hyperlipidemia, unspecified hyperlipidemia type    6. Coronary artery disease involving coronary bypass graft of native heart without angina pectoris    7. Breast cancer screening by mammogram    8. Cervical cancer screening          Plan    Jorge was seen today for follow-up.    Diagnoses and all  orders for this visit:    Annual physical exam    Type 2 diabetes mellitus with other specified complication, with long-term current use of insulin  -     semaglutide (OZEMPIC) 0.25 mg or 0.5 mg (2 mg/3 mL) pen injector; Inject 0.5 mg into the skin every 7 days.    Fibromyalgia    Hypertension, unspecified type    Hyperlipidemia, unspecified hyperlipidemia type    Coronary artery disease involving coronary bypass graft of native heart without angina pectoris    Breast cancer screening by mammogram  -     Mammo Digital Screening Bilat w/ Chao; Future    Cervical cancer screening  -     Ambulatory referral/consult to Gynecology; Future      Physically, everything looks really good today.      A1c holding steady at 7.3%.  A bit higher than goal, but they are starting to focus on diet and exercise now.  Will have her continue current medicines for the next six months, then recheck.  Can adjust doses at that time.    For her , sent request to pharmacy patient assistance to see about getting him qualified free medications.  If that works for him, we can then follow-up and send referral for her own medications.    Order for screening mammogram placed today.    Referral to Ms. Hawk for well-woman, Pap.  Will try to get that the same day as the mammogram.    We will get copies of her last eye exam from [place].    FOLLOW-UP:  Follow up in about 6 months (around 7/30/2024) for check up.    I spent a total of 45 minutes face to face and non-face to face on the date of this visit.This includes time preparing to see the patient (eg, review of tests, notes), obtaining and/or reviewing additional history from an independent historian and/or outside medical records, documenting clinical information in the electronic health record, independently interpreting results and/or communicating results to the patient/family/caregiver, or care coordinator.    Signed by:  Ahsan Tripp MD

## 2024-02-02 ENCOUNTER — PATIENT OUTREACH (OUTPATIENT)
Dept: ADMINISTRATIVE | Facility: HOSPITAL | Age: 59
End: 2024-02-02
Payer: COMMERCIAL

## 2024-02-14 ENCOUNTER — OFFICE VISIT (OUTPATIENT)
Dept: OBSTETRICS AND GYNECOLOGY | Facility: CLINIC | Age: 59
End: 2024-02-14
Payer: COMMERCIAL

## 2024-02-14 ENCOUNTER — HOSPITAL ENCOUNTER (OUTPATIENT)
Dept: RADIOLOGY | Facility: HOSPITAL | Age: 59
Discharge: HOME | End: 2024-02-14
Attending: FAMILY MEDICINE
Payer: COMMERCIAL

## 2024-02-14 VITALS — BODY MASS INDEX: 36.84 KG/M2 | WEIGHT: 215.81 LBS | HEIGHT: 64 IN

## 2024-02-14 DIAGNOSIS — Z01.419 ENCOUNTER FOR GYNECOLOGICAL EXAMINATION WITHOUT ABNORMAL FINDING: Primary | ICD-10-CM

## 2024-02-14 DIAGNOSIS — Z12.4 CERVICAL CANCER SCREENING: ICD-10-CM

## 2024-02-14 DIAGNOSIS — Z78.0 POSTMENOPAUSAL: ICD-10-CM

## 2024-02-14 DIAGNOSIS — Z12.31 BREAST CANCER SCREENING BY MAMMOGRAM: ICD-10-CM

## 2024-02-14 DIAGNOSIS — Z12.31 ENCOUNTER FOR SCREENING MAMMOGRAM FOR BREAST CANCER: ICD-10-CM

## 2024-02-14 PROBLEM — I10 DIABETES MELLITUS WITH COINCIDENT HYPERTENSION: Status: ACTIVE | Noted: 2017-08-09

## 2024-02-14 PROBLEM — F32.A DEPRESSIVE DISORDER: Status: ACTIVE | Noted: 2024-02-14

## 2024-02-14 PROBLEM — Z99.89 USES CONTINUOUS POSITIVE AIRWAY PRESSURE (CPAP) VENTILATION AT HOME: Status: ACTIVE | Noted: 2024-02-14

## 2024-02-14 PROBLEM — M79.7 FIBROMYALGIA: Status: ACTIVE | Noted: 2017-02-15

## 2024-02-14 PROBLEM — R06.02 SHORTNESS OF BREATH: Status: ACTIVE | Noted: 2023-06-07

## 2024-02-14 PROBLEM — I25.10 ARTERIOSCLEROSIS OF CORONARY ARTERY: Status: ACTIVE | Noted: 2024-02-14

## 2024-02-14 PROBLEM — I51.9 HEART DISEASE: Status: ACTIVE | Noted: 2024-02-14

## 2024-02-14 PROBLEM — E78.00 HIGH CHOLESTEROL: Status: ACTIVE | Noted: 2024-02-14

## 2024-02-14 PROBLEM — E11.9 DIABETES MELLITUS WITH COINCIDENT HYPERTENSION: Status: ACTIVE | Noted: 2017-08-09

## 2024-02-14 PROBLEM — F41.9 ANXIETY DISORDER: Status: ACTIVE | Noted: 2024-02-14

## 2024-02-14 PROCEDURE — 3051F HG A1C>EQUAL 7.0%<8.0%: CPT | Mod: CPTII,S$GLB,, | Performed by: NURSE PRACTITIONER

## 2024-02-14 PROCEDURE — 99386 PREV VISIT NEW AGE 40-64: CPT | Mod: S$GLB,,, | Performed by: NURSE PRACTITIONER

## 2024-02-14 PROCEDURE — 77063 BREAST TOMOSYNTHESIS BI: CPT | Mod: 26,,, | Performed by: RADIOLOGY

## 2024-02-14 PROCEDURE — 77067 SCR MAMMO BI INCL CAD: CPT | Mod: 26,,, | Performed by: RADIOLOGY

## 2024-02-14 PROCEDURE — 99999 PR PBB SHADOW E&M-EST. PATIENT-LVL IV: CPT | Mod: PBBFAC,,, | Performed by: NURSE PRACTITIONER

## 2024-02-14 PROCEDURE — 1159F MED LIST DOCD IN RCRD: CPT | Mod: CPTII,S$GLB,, | Performed by: NURSE PRACTITIONER

## 2024-02-14 PROCEDURE — 1160F RVW MEDS BY RX/DR IN RCRD: CPT | Mod: CPTII,S$GLB,, | Performed by: NURSE PRACTITIONER

## 2024-02-14 PROCEDURE — 3008F BODY MASS INDEX DOCD: CPT | Mod: CPTII,S$GLB,, | Performed by: NURSE PRACTITIONER

## 2024-02-14 PROCEDURE — 77067 SCR MAMMO BI INCL CAD: CPT | Mod: TC,PN

## 2024-02-14 PROCEDURE — 87624 HPV HI-RISK TYP POOLED RSLT: CPT | Performed by: NURSE PRACTITIONER

## 2024-02-14 PROCEDURE — 88175 CYTOPATH C/V AUTO FLUID REDO: CPT | Performed by: NURSE PRACTITIONER

## 2024-02-14 NOTE — PROGRESS NOTES
"CC: Well woman exam    Jorge Jones is a 58 y.o. female  presents for well woman exam.  LMP: No LMP recorded. Patient is postmenopausal..  No gyn issues  Was seeing Dr. Brewer  Pap normal  Mmg scheduled  Pt does have some sweating of head   Stopped cycling around 55/56  Had CABG x 3 in 2023        Past Medical History:   Diagnosis Date    Arthritis     Coronary artery disease     Diabetes mellitus, type 2     Fibromyalgia     Hyperlipidemia     Hypertension      Past Surgical History:   Procedure Laterality Date    CORONARY ARTERY BYPASS GRAFT      TONSILLECTOMY      TUBAL LIGATION       Social History     Socioeconomic History    Marital status:    Tobacco Use    Smoking status: Never     Passive exposure: Past    Smokeless tobacco: Never   Substance and Sexual Activity    Alcohol use: Never    Drug use: Never    Sexual activity: Not Currently     Partners: Male     Birth control/protection: See Surgical Hx     Family History   Problem Relation Age of Onset    Diabetes Father     Hyperlipidemia Father     Diabetes Mother     Hyperlipidemia Mother     Heart attack Mother     Breast cancer Paternal Aunt     Breast cancer Paternal Cousin     Colon cancer Paternal Cousin     Ovarian cancer Neg Hx     Uterine cancer Neg Hx     Cervical cancer Neg Hx      OB History          2    Para   2    Term   2            AB        Living   2         SAB        IAB        Ectopic        Multiple        Live Births   2                 BP (!) (P) 142/82   Ht 5' 4" (1.626 m)   Wt 97.9 kg (215 lb 13.3 oz)   BMI 37.05 kg/m²       ROS:  Per hpi    PHYSICAL EXAM:  APPEARANCE: Well nourished, well developed, in no acute distress.  AFFECT: WNL, alert and oriented x 3  SKIN: No acne or hirsutism  NECK: Neck symmetric without masses or thyromegaly  NODES: No inguinal, cervical, axillary, or femoral lymph node enlargement  CHEST: Good respiratory effect  ABDOMEN: Soft.  No tenderness or masses.  No " hepatosplenomegaly.  No hernias.  BREASTS: Symmetrical, no skin changes or visible lesions.  No palpable masses, nipple discharge bilaterally.  PELVIC: Normal external genitalia without lesions.  Normal hair distribution.  Adequate perineal body, normal urethral meatus.  Vagina moist and well rugated without lesions or discharge.  Cervix pink, without lesions, discharge or tenderness.  No significant cystocele or rectocele.  Bimanual exam shows uterus to be normal size, regular, mobile and nontender.  Adnexa without masses or tenderness.    EXTREMITIES: No edema.  Physical Exam    1. Encounter for gynecological examination without abnormal finding  Liquid-Based Pap Smear, Screening    HPV High Risk Genotypes, PCR      2. Cervical cancer screening  Ambulatory referral/consult to Gynecology      3. Postmenopausal        4. Encounter for screening mammogram for breast cancer         AND PLAN:    Jorge was seen today for well woman.    Diagnoses and all orders for this visit:    Encounter for gynecological examination without abnormal finding  -     Liquid-Based Pap Smear, Screening  -     HPV High Risk Genotypes, PCR    Cervical cancer screening  -     Ambulatory referral/consult to Gynecology    Postmenopausal    Encounter for screening mammogram for breast cancer     Black cohosh  Evening primrose oil   Patient was counseled today on A.C.S. Pap guidelines and recommendations for yearly pelvic exams, mammograms and monthly self breast exams; to see her PCP for other health maintenance.

## 2024-02-19 LAB
FINAL PATHOLOGIC DIAGNOSIS: NORMAL
Lab: NORMAL

## 2024-02-24 LAB
HPV HR 12 DNA SPEC QL NAA+PROBE: NEGATIVE
HPV16 AG SPEC QL: NEGATIVE
HPV18 DNA SPEC QL NAA+PROBE: NEGATIVE

## 2024-06-17 ENCOUNTER — TELEPHONE (OUTPATIENT)
Dept: PRIMARY CARE CLINIC | Facility: CLINIC | Age: 59
End: 2024-06-17
Payer: COMMERCIAL

## 2024-06-17 NOTE — TELEPHONE ENCOUNTER
----- Message from Estefania Rebolledo MA sent at 6/17/2024 12:20 PM CDT -----  Contact: [phone]  Pt called                  Pt is requesting a call back to speak with MS BARRAGAN

## 2024-06-17 NOTE — TELEPHONE ENCOUNTER
Pts insurance is no longer covering basaglar. Pt would like a new RX for Toujeo sent over to [facility]

## 2024-07-03 ENCOUNTER — TELEPHONE (OUTPATIENT)
Dept: PRIMARY CARE CLINIC | Facility: CLINIC | Age: 59
End: 2024-07-03
Payer: COMMERCIAL

## 2024-07-03 DIAGNOSIS — I10 HYPERTENSION, UNSPECIFIED TYPE: ICD-10-CM

## 2024-07-03 DIAGNOSIS — Z79.4 TYPE 2 DIABETES MELLITUS WITH OTHER SPECIFIED COMPLICATION, WITH LONG-TERM CURRENT USE OF INSULIN: Primary | ICD-10-CM

## 2024-07-03 DIAGNOSIS — E11.69 TYPE 2 DIABETES MELLITUS WITH OTHER SPECIFIED COMPLICATION, WITH LONG-TERM CURRENT USE OF INSULIN: Primary | ICD-10-CM

## 2024-07-03 DIAGNOSIS — E78.5 HYPERLIPIDEMIA, UNSPECIFIED HYPERLIPIDEMIA TYPE: ICD-10-CM

## 2024-07-03 DIAGNOSIS — Z00.00 ANNUAL PHYSICAL EXAM: Primary | ICD-10-CM

## 2024-07-03 NOTE — TELEPHONE ENCOUNTER
----- Message from Mariely Randolph sent at 7/3/2024 11:12 AM CDT -----  Patient would like the order for blood work sent to [facility] in [place]. Channing BRUCE

## 2024-07-06 LAB
ALBUMIN SERPL-MCNC: 4.5 G/DL (ref 3.9–4.9)
ALBUMIN/CREAT UR: 19 MG/G CREAT (ref 0–29)
ALP SERPL-CCNC: 97 IU/L (ref 44–121)
ALT SERPL-CCNC: 26 IU/L (ref 0–32)
AST SERPL-CCNC: 22 IU/L (ref 0–40)
BASOPHILS # BLD AUTO: 0.1 X10E3/UL (ref 0–0.2)
BASOPHILS NFR BLD AUTO: 1 %
BILIRUB SERPL-MCNC: <0.2 MG/DL (ref 0–1.2)
BUN SERPL-MCNC: 13 MG/DL (ref 8–27)
BUN/CREAT SERPL: 16 (ref 12–28)
CALCIUM SERPL-MCNC: 10.2 MG/DL (ref 8.7–10.3)
CHLORIDE SERPL-SCNC: 98 MMOL/L (ref 96–106)
CHOLEST SERPL-MCNC: 115 MG/DL (ref 100–199)
CO2 SERPL-SCNC: 26 MMOL/L (ref 20–29)
CREAT SERPL-MCNC: 0.81 MG/DL (ref 0.57–1)
CREAT UR-MCNC: 234.4 MG/DL
EOSINOPHIL # BLD AUTO: 0.3 X10E3/UL (ref 0–0.4)
EOSINOPHIL NFR BLD AUTO: 4 %
ERYTHROCYTE [DISTWIDTH] IN BLOOD BY AUTOMATED COUNT: 13.8 % (ref 11.7–15.4)
EST. GFR  (NO RACE VARIABLE): 83 ML/MIN/1.73
GLOBULIN SER CALC-MCNC: 2.9 G/DL (ref 1.5–4.5)
GLUCOSE SERPL-MCNC: 220 MG/DL (ref 70–99)
HBA1C MFR BLD: 8.3 % (ref 4.8–5.6)
HCT VFR BLD AUTO: 41.9 % (ref 34–46.6)
HDLC SERPL-MCNC: 34 MG/DL
HGB BLD-MCNC: 13.3 G/DL (ref 11.1–15.9)
IMM GRANULOCYTES # BLD AUTO: 0 X10E3/UL (ref 0–0.1)
IMM GRANULOCYTES NFR BLD AUTO: 0 %
LDLC SERPL CALC-MCNC: 63 MG/DL (ref 0–99)
LYMPHOCYTES # BLD AUTO: 3 X10E3/UL (ref 0.7–3.1)
LYMPHOCYTES NFR BLD AUTO: 32 %
MCH RBC QN AUTO: 26.2 PG (ref 26.6–33)
MCHC RBC AUTO-ENTMCNC: 31.7 G/DL (ref 31.5–35.7)
MCV RBC AUTO: 83 FL (ref 79–97)
MICROALBUMIN UR-MCNC: 43.7 UG/ML
MONOCYTES # BLD AUTO: 0.7 X10E3/UL (ref 0.1–0.9)
MONOCYTES NFR BLD AUTO: 7 %
NEUTROPHILS # BLD AUTO: 5.5 X10E3/UL (ref 1.4–7)
NEUTROPHILS NFR BLD AUTO: 56 %
PLATELET # BLD AUTO: 371 X10E3/UL (ref 150–450)
POTASSIUM SERPL-SCNC: 4.4 MMOL/L (ref 3.5–5.2)
PROT SERPL-MCNC: 7.4 G/DL (ref 6–8.5)
RBC # BLD AUTO: 5.08 X10E6/UL (ref 3.77–5.28)
SODIUM SERPL-SCNC: 141 MMOL/L (ref 134–144)
TRIGL SERPL-MCNC: 93 MG/DL (ref 0–149)
TSH SERPL DL<=0.005 MIU/L-ACNC: 1.52 UIU/ML (ref 0.45–4.5)
VLDLC SERPL CALC-MCNC: 18 MG/DL (ref 5–40)
WBC # BLD AUTO: 9.6 X10E3/UL (ref 3.4–10.8)

## 2024-07-08 ENCOUNTER — OFFICE VISIT (OUTPATIENT)
Dept: PRIMARY CARE CLINIC | Facility: CLINIC | Age: 59
End: 2024-07-08
Payer: COMMERCIAL

## 2024-07-08 VITALS
SYSTOLIC BLOOD PRESSURE: 122 MMHG | RESPIRATION RATE: 18 BRPM | HEIGHT: 64 IN | HEART RATE: 87 BPM | WEIGHT: 218.69 LBS | TEMPERATURE: 98 F | OXYGEN SATURATION: 96 % | BODY MASS INDEX: 37.34 KG/M2 | DIASTOLIC BLOOD PRESSURE: 78 MMHG

## 2024-07-08 DIAGNOSIS — E78.5 HYPERLIPIDEMIA, UNSPECIFIED HYPERLIPIDEMIA TYPE: ICD-10-CM

## 2024-07-08 DIAGNOSIS — I25.810 CORONARY ARTERY DISEASE INVOLVING CORONARY BYPASS GRAFT OF NATIVE HEART WITHOUT ANGINA PECTORIS: ICD-10-CM

## 2024-07-08 DIAGNOSIS — Z79.4 TYPE 2 DIABETES MELLITUS WITH OTHER SPECIFIED COMPLICATION, WITH LONG-TERM CURRENT USE OF INSULIN: Primary | ICD-10-CM

## 2024-07-08 DIAGNOSIS — M79.7 FIBROMYALGIA: ICD-10-CM

## 2024-07-08 DIAGNOSIS — R23.2 HOT FLASHES: ICD-10-CM

## 2024-07-08 DIAGNOSIS — E11.69 TYPE 2 DIABETES MELLITUS WITH OTHER SPECIFIED COMPLICATION, WITH LONG-TERM CURRENT USE OF INSULIN: Primary | ICD-10-CM

## 2024-07-08 DIAGNOSIS — I10 HYPERTENSION, UNSPECIFIED TYPE: ICD-10-CM

## 2024-07-08 DIAGNOSIS — R61 EXCESSIVE SWEATING: ICD-10-CM

## 2024-07-08 PROCEDURE — 1159F MED LIST DOCD IN RCRD: CPT | Mod: CPTII,S$GLB,, | Performed by: FAMILY MEDICINE

## 2024-07-08 PROCEDURE — 99999 PR PBB SHADOW E&M-EST. PATIENT-LVL IV: CPT | Mod: PBBFAC,,, | Performed by: FAMILY MEDICINE

## 2024-07-08 PROCEDURE — 3052F HG A1C>EQUAL 8.0%<EQUAL 9.0%: CPT | Mod: CPTII,S$GLB,, | Performed by: FAMILY MEDICINE

## 2024-07-08 PROCEDURE — 3008F BODY MASS INDEX DOCD: CPT | Mod: CPTII,S$GLB,, | Performed by: FAMILY MEDICINE

## 2024-07-08 PROCEDURE — 3078F DIAST BP <80 MM HG: CPT | Mod: CPTII,S$GLB,, | Performed by: FAMILY MEDICINE

## 2024-07-08 PROCEDURE — 3074F SYST BP LT 130 MM HG: CPT | Mod: CPTII,S$GLB,, | Performed by: FAMILY MEDICINE

## 2024-07-08 PROCEDURE — 3066F NEPHROPATHY DOC TX: CPT | Mod: CPTII,S$GLB,, | Performed by: FAMILY MEDICINE

## 2024-07-08 PROCEDURE — G2211 COMPLEX E/M VISIT ADD ON: HCPCS | Mod: S$GLB,,, | Performed by: FAMILY MEDICINE

## 2024-07-08 PROCEDURE — 3061F NEG MICROALBUMINURIA REV: CPT | Mod: CPTII,S$GLB,, | Performed by: FAMILY MEDICINE

## 2024-07-08 PROCEDURE — 99215 OFFICE O/P EST HI 40 MIN: CPT | Mod: S$GLB,,, | Performed by: FAMILY MEDICINE

## 2024-07-08 RX ORDER — METFORMIN HYDROCHLORIDE 500 MG/1
1000 TABLET, EXTENDED RELEASE ORAL 2 TIMES DAILY WITH MEALS
Qty: 360 TABLET | Refills: 3 | Status: SHIPPED | OUTPATIENT
Start: 2024-07-08 | End: 2025-07-08

## 2024-07-08 RX ORDER — PRAVASTATIN SODIUM 20 MG/1
20 TABLET ORAL DAILY
Qty: 90 TABLET | Refills: 3 | Status: SHIPPED | OUTPATIENT
Start: 2024-07-08

## 2024-07-08 RX ORDER — INSULIN GLARGINE 300 [IU]/ML
20 INJECTION, SOLUTION SUBCUTANEOUS DAILY
Qty: 6 ML | Refills: 3 | Status: SHIPPED | OUTPATIENT
Start: 2024-07-08 | End: 2025-07-08

## 2024-07-08 RX ORDER — DULOXETIN HYDROCHLORIDE 30 MG/1
30 CAPSULE, DELAYED RELEASE ORAL DAILY
Qty: 90 CAPSULE | Refills: 3 | Status: SHIPPED | OUTPATIENT
Start: 2024-07-08

## 2024-07-08 RX ORDER — AMLODIPINE BESYLATE 5 MG/1
5 TABLET ORAL NIGHTLY
Qty: 90 TABLET | Refills: 3 | Status: SHIPPED | OUTPATIENT
Start: 2024-07-08

## 2024-07-08 RX ORDER — SEMAGLUTIDE 1.34 MG/ML
1 INJECTION, SOLUTION SUBCUTANEOUS
Qty: 3 ML | Refills: 12 | Status: SHIPPED | OUTPATIENT
Start: 2024-07-08 | End: 2025-07-08

## 2024-07-08 RX ORDER — PANTOPRAZOLE SODIUM 40 MG/1
40 TABLET, DELAYED RELEASE ORAL DAILY
Qty: 90 TABLET | Refills: 3 | Status: SHIPPED | OUTPATIENT
Start: 2024-07-08

## 2024-07-08 RX ORDER — METOPROLOL SUCCINATE 100 MG/1
100 TABLET, EXTENDED RELEASE ORAL NIGHTLY
Qty: 90 TABLET | Refills: 3 | Status: SHIPPED | OUTPATIENT
Start: 2024-07-08

## 2024-07-08 RX ORDER — OLMESARTAN MEDOXOMIL AND HYDROCHLOROTHIAZIDE 40/25 40; 25 MG/1; MG/1
1 TABLET ORAL DAILY
Qty: 90 TABLET | Refills: 3 | Status: SHIPPED | OUTPATIENT
Start: 2024-07-08 | End: 2025-07-08

## 2024-07-08 NOTE — PATIENT INSTRUCTIONS
We really need to work to get your sugar under control.      I am sending a prescription for Toujeo.  Let us go back up to 20 units.  Three days from now, check your blood sugar in the morning.  If greater than 180, increase by 2 units.  Keep checking your sugar every three days and as long as it is over 180, increase by 2 units.  When it gets below 180, stay at that dose.    Let us keep taking two tablets of metformin, twice a day.    Let us also increase the Ozempic to 1 mg weekly.  New prescription sent to pharmacy today.    As with Mr. Milligan, let us try to get you covered under the patient assistance program.  I am printing a prescription for [facility].  They make Ozempic and Tresiba insulin.  Fill out your portion, then bring it back to me with the financial information they request.  Then, I will fill out my section and we can fax it to them.    Let us also plan to recheck your A1c in about three months.  I will place orders today for [facility].      If we can not make significant improvement in the next three months, then we will get both of you referred over to our diabetes clinic.    Continue to eat a healthy diet.  Be careful with portion sizes.  Includes lots of fresh fruits, vegetables, whole grains, lean proteins.  See info below.    Keep hydrated.  Be sure to drink at least 8-10, 8 oz, glasses of water every day.    Stay active.  Try to do some sort of physical activity every day.  Nothing outrageous, just try walking for 10-15 minutes each day.

## 2024-07-08 NOTE — PROGRESS NOTES
"    [facility]       [address] [place] [address]      Phone: [phone]      Fax: [phone]    Ahsan Tripp MD                Office Visit  07/08/2024        Subjective      HPI:  Jorge Jones is a 59 y.o. female presents today in clinic for "Follow-up  ."     59-year-old female presents today to follow up on multiple issues    Her , Mr. Milligan, present.  He provides portions of the history.    Overall, feels okay today.  No chest pains, shortness O breath.  No fever, chills, body aches.  No coughing, sneezing, URI type symptoms.  Appetite normal.  Bowel movements normal.  No urinary issues.      States that she feels like her hormones are off.  Gets hot flashes.  Sweats frequently.  Saw gyn recently who recommended black cohosh supplements.  Not sure if this is menopause, [place] summer, or hormones.  She also mentions that she gets easily exhausted.  Just simply walking around her house, carrying laundry to living room, leaves her tired, sweaty.    She also mentions that her skin feels funny.  The top of her right foot hurts, but only if something touches it and only for a brief moment.  Wearing shoes does not bother her.  But, if she has barefoot and the other foot brushes up against it, or if she rubs against something briefly, then it will feel tingling, itchy, sore.  This sensation tends to move around a bit.  Seems to be getting better over the last few days, though.    Has diabetes.  In the past, was taking Synjardy, Lantus.  Synjardy caused her urinary issues, so we discontinued it.  Instead, she has been taking Basaglar 20 units daily, along with metformin 1000 mg twice a day, and Ozempic 0.5 mg weekly.  She ran out of her Basaglar two weeks ago.  Also, it appears her insurance does not cover this and it is quite expensive.  States that she needs to switch to Toujeo instead.  Even Ozempic is expensive for her.  Costing approximately " "$100+ per month.      Has hypertension.  Also has CAD/CABG.  HLD.  Currently takes amlodipine 5 mg daily, along with Benicar HCT 40-25 mg daily.  Takes pravastatin 20 mg for cholesterol.  Cardiologist also has her on Toprol- mg daily and baby aspirin plus Plavix.  At some point, he may be stopping the Plavix since it will be one year after her surgery date.    PMH:  Dm, HTN, CAD, HLD, LAURIE, fibromyalgia  PSH:  Tonsils, BTL  FMH:  Dm, HLD, HTN, CAD/mi, AFib  Allergies:  Ceftin causes hives.  Bactrim/sulfa also causes hives.  Social:  Stays at home.  .  Denies T/a/D    Exercise:  No regular exercise program.  Has a stationary bike, but is limited due to knee pains.    Ortho:  Dr. Hood at [facility]  Eyes:  Kat  Cardiology:  Dr. Vipin Mercedes  Gyn:  Carine (Prev Dr. Jami Brewer at [facility])    Colon: 2015. Repeat in 10 yrs ()    Pap:  2024  MM2024    **Labs at [facility]**        The following were updated and reviewed by myself in the chart: medications, past medical history, past surgical history, family history, social history, and allergies.     Medications:  Current Outpatient Medications on File Prior to Visit   Medication Sig Dispense Refill    aspirin (ECOTRIN) 81 MG EC tablet Take 81 mg by mouth.      BD ULTRA-FINE SHORT PEN NEEDLE 31 gauge x 5/16" Ndle Inject 1 Syringe into the skin.      clopidogreL (PLAVIX) 75 mg tablet Take 1 tablet (75 mg total) by mouth once daily. 90 tablet 3    omega-3 fatty acids/fish oil (FISH OIL-OMEGA-3 FATTY ACIDS) 300-1,000 mg capsule Take 2 g by mouth.      vitamin D (VITAMIN D3) 1000 units Tab Take 1,000 Units by mouth once daily.      [DISCONTINUED] amLODIPine (NORVASC) 5 MG tablet Take 1 tablet (5 mg total) by mouth every evening. 90 tablet 3    [DISCONTINUED] DULoxetine (CYMBALTA) 30 MG capsule Take 1 capsule (30 mg total) by mouth once daily. 90 capsule 3    [DISCONTINUED] metFORMIN (GLUCOPHAGE-XR) 500 MG ER 24hr tablet " Take 2 tablets (1,000 mg total) by mouth 2 (two) times daily with meals. 360 tablet 3    [DISCONTINUED] metoprolol succinate (TOPROL-XL) 100 MG 24 hr tablet Take 1 tablet (100 mg total) by mouth every evening. 90 tablet 3    [DISCONTINUED] olmesartan-hydrochlorothiazide (BENICAR HCT) 40-25 mg per tablet Take 1 tablet by mouth once daily. 90 tablet 3    [DISCONTINUED] pantoprazole (PROTONIX) 40 MG tablet Take 1 tablet (40 mg total) by mouth once daily. 90 tablet 3    [DISCONTINUED] pravastatin (PRAVACHOL) 20 MG tablet Take 1 tablet (20 mg total) by mouth once daily. 90 tablet 3    [DISCONTINUED] semaglutide (OZEMPIC) 0.25 mg or 0.5 mg (2 mg/3 mL) pen injector Inject 0.5 mg into the skin every 7 days. 3 mL 12    [DISCONTINUED] insulin (BASAGLAR KWIKPEN U-100 INSULIN) glargine 100 units/mL SubQ pen Inject 20 Units into the skin every evening. (Patient not taking: Reported on 7/8/2024) 18 mL 3     No current facility-administered medications on file prior to visit.        PMHx:  Past Medical History:   Diagnosis Date    Arthritis     Coronary artery disease     Diabetes mellitus, type 2     Fibromyalgia     Hyperlipidemia     Hypertension       Patient Active Problem List    Diagnosis Date Noted    Anxiety disorder 02/14/2024    Arteriosclerosis of coronary artery 02/14/2024    Depressive disorder 02/14/2024    High cholesterol 02/14/2024    Heart disease 02/14/2024    Uses continuous positive airway pressure (CPAP) ventilation at home 02/14/2024    Shortness of breath 06/07/2023    BMI 38.0-38.9,adult 01/05/2021    Diabetes mellitus with coincident hypertension 08/09/2017    Fibromyalgia 02/15/2017    Adrenal adenoma 05/18/2016    LAURIE on CPAP 07/08/2015    Allergic rhinitis 05/12/2014    Vitamin D deficiency disease 05/12/2014        PSHx:  Past Surgical History:   Procedure Laterality Date    CORONARY ARTERY BYPASS GRAFT      TONSILLECTOMY      TUBAL LIGATION          FHx:  Family History   Problem Relation Name Age  "of Onset    Diabetes Father      Hyperlipidemia Father      Diabetes Mother      Hyperlipidemia Mother      Heart attack Mother      Breast cancer Paternal Aunt      Breast cancer Paternal Cousin      Colon cancer Paternal Cousin      Ovarian cancer Neg Hx      Uterine cancer Neg Hx      Cervical cancer Neg Hx          Social:  Social History     Socioeconomic History    Marital status:    Tobacco Use    Smoking status: Never     Passive exposure: Past    Smokeless tobacco: Never   Substance and Sexual Activity    Alcohol use: Never    Drug use: Never    Sexual activity: Not Currently     Partners: Male     Birth control/protection: See Surgical Hx        Allergies:  Review of patient's allergies indicates:   Allergen Reactions    Cefuroxime Hives     No reaction to Keflex and PCN      Sulfamethoxazole-trimethoprim Hives and Nausea And Vomiting    Cefuroxime axetil Hives and Nausea And Vomiting    Empagliflozin Nausea And Vomiting    Sulfamethoxazole Hives    Trimethoprim Hives        ROS:  Review of Systems   Constitutional:  Positive for activity change, diaphoresis and fatigue. Negative for appetite change, chills and fever.   HENT:  Negative for congestion, postnasal drip, rhinorrhea, sore throat and trouble swallowing.    Respiratory:  Negative for cough, shortness of breath and wheezing.    Cardiovascular:  Negative for chest pain and palpitations.   Gastrointestinal:  Negative for abdominal pain, constipation, diarrhea, nausea and vomiting.   Genitourinary:  Negative for difficulty urinating.   Musculoskeletal:  Negative for arthralgias and myalgias.   Skin:  Negative for color change and rash.   Neurological:  Negative for speech difficulty and headaches.   All other systems reviewed and are negative.         Objective      /78   Pulse 87   Temp 97.9 °F (36.6 °C) (Oral)   Resp 18   Ht 5' 4" (1.626 m)   Wt 99.2 kg (218 lb 11.1 oz)   SpO2 96%   BMI 37.54 kg/m²   Ht Readings from Last 3 " "Encounters:   07/08/24 5' 4" (1.626 m)   02/14/24 5' 4" (1.626 m)   01/30/24 5' 4" (1.626 m)     Wt Readings from Last 3 Encounters:   07/08/24 99.2 kg (218 lb 11.1 oz)   02/14/24 97.9 kg (215 lb 13.3 oz)   01/30/24 96.3 kg (212 lb 4.9 oz)       PHYSICAL EXAM:  Physical Exam  Vitals and nursing note reviewed.   Constitutional:       General: She is not in acute distress.     Appearance: Normal appearance.   HENT:      Head: Normocephalic and atraumatic.      Right Ear: Tympanic membrane, ear canal and external ear normal.      Left Ear: Tympanic membrane, ear canal and external ear normal.      Nose: Nose normal. No congestion or rhinorrhea.      Mouth/Throat:      Mouth: Mucous membranes are moist.      Pharynx: Oropharynx is clear. No oropharyngeal exudate or posterior oropharyngeal erythema.   Eyes:      Extraocular Movements: Extraocular movements intact.      Conjunctiva/sclera: Conjunctivae normal.      Pupils: Pupils are equal, round, and reactive to light.   Cardiovascular:      Rate and Rhythm: Normal rate and regular rhythm.   Pulmonary:      Effort: Pulmonary effort is normal. No respiratory distress.      Breath sounds: No wheezing, rhonchi or rales.   Musculoskeletal:         General: Normal range of motion.      Cervical back: Normal range of motion.   Lymphadenopathy:      Cervical: No cervical adenopathy.   Skin:     General: Skin is warm and dry.      Findings: No rash.   Neurological:      Mental Status: She is alert.              LABS / IMAGING:  Recent Results (from the past 4368 hour(s))   HEMOGLOBIN A1C    Collection Time: 01/26/24  8:59 AM   Result Value Ref Range    Hemoglobin A1C 7.3 (H) <5.7 % of total Hgb   HIV 1/2 Ag/Ab (4th Gen)    Collection Time: 01/26/24  8:59 AM   Result Value Ref Range    HIV Ag/Ab 4th Gen NON-REACTIVE NON-REACTIVE   Liquid-Based Pap Smear, Screening    Collection Time: 02/14/24  9:56 AM   Result Value Ref Range    Final Pathologic Diagnosis       Specimen " Adequacy  Satisfactory for interpretation. Endocervical component is present.    Donnelly Category  Negative for intraepithelial lesion or malignancy.  Fungal spores present.      Disclaimer       The Pap smear is a screening test that aids in the detection of cervical cancer and cancer precursors. Both false positive and false negative results can occur. The test should be used at regular intervals, and positive results should be confirmed before   definitive therapy.  This liquid based specimen is processed using the  or  Thin PrepPAP System. This specimen has been analyzed by the ThinPrep Imaging System (Pixeon), an automated imaging and review system which assists the laboratory in evaluating   cells on ThinPrep PAP tests. Following automated imaging, selected fields from every slide are reviewed by a cytotechnologist and/or pathologist.     Screening was performed at [facility], [address] [place] [address].     HPV High Risk Genotypes, PCR    Collection Time: 02/14/24  9:56 AM   Result Value Ref Range    HPV other High Risk types, PCR Negative Negative    HPV High Risk type 16, PCR Negative Negative    HPV High Risk type 18, PCR Negative Negative   Lipid Panel    Collection Time: 07/05/24  9:02 AM   Result Value Ref Range    Cholesterol 115 100 - 199 mg/dL    Triglycerides 93 0 - 149 mg/dL    HDL 34 (L) >39 mg/dL    VLDL Cholesterol Sudeep 18 5 - 40 mg/dL    LDL Calculated 63 0 - 99 mg/dL   TSH    Collection Time: 07/05/24  9:02 AM   Result Value Ref Range    TSH 1.520 0.450 - 4.500 uIU/mL   Comprehensive Metabolic Panel    Collection Time: 07/05/24  9:02 AM   Result Value Ref Range    Glucose 220 (H) 70 - 99 mg/dL    BUN 13 8 - 27 mg/dL    Creatinine 0.81 0.57 - 1.00 mg/dL    eGFR 83 >59 mL/min/1.73    BUN/Creatinine Ratio 16 12 - 28    Sodium 141 134 - 144 mmol/L    Potassium 4.4 3.5 - 5.2 mmol/L    Chloride 98 96 - 106 mmol/L     CO2 26 20 - 29 mmol/L    Calcium 10.2 8.7 - 10.3 mg/dL    Protein, Total 7.4 6.0 - 8.5 g/dL    Albumin 4.5 3.9 - 4.9 g/dL    Globulin, Total 2.9 1.5 - 4.5 g/dL    Total Bilirubin <0.2 0.0 - 1.2 mg/dL    Alkaline Phosphatase 97 44 - 121 IU/L    AST 22 0 - 40 IU/L    ALT 26 0 - 32 IU/L   CBC Auto Differential    Collection Time: 07/05/24  9:02 AM   Result Value Ref Range    WBC 9.6 3.4 - 10.8 x10E3/uL    RBC 5.08 3.77 - 5.28 x10E6/uL    Hemoglobin 13.3 11.1 - 15.9 g/dL    Hematocrit 41.9 34.0 - 46.6 %    MCV 83 79 - 97 fL    MCH 26.2 (L) 26.6 - 33.0 pg    MCHC 31.7 31.5 - 35.7 g/dL    RDW 13.8 11.7 - 15.4 %    Platelets 371 150 - 450 x10E3/uL    Neutrophils 56 Not Estab. %    Lymphs 32 Not Estab. %    Monocytes 7 Not Estab. %    Eos 4 Not Estab. %    Basos 1 Not Estab. %    Neutrophils (Absolute) 5.5 1.4 - 7.0 x10E3/uL    Lymphs (Absolute) 3.0 0.7 - 3.1 x10E3/uL    Monocytes(Absolute) 0.7 0.1 - 0.9 x10E3/uL    Eos (Absolute) 0.3 0.0 - 0.4 x10E3/uL    Baso (Absolute) 0.1 0.0 - 0.2 x10E3/uL    Immature Granulocytes 0 Not Estab. %    Immature Grans (Abs) 0.0 0.0 - 0.1 x10E3/uL   Hemoglobin A1C    Collection Time: 07/05/24  9:02 AM   Result Value Ref Range    Hemoglobin A1c 8.3 (H) 4.8 - 5.6 %   Microalbumin/Creatinine Ratio, Urine    Collection Time: 07/05/24  9:02 AM   Result Value Ref Range    Creatinine, Urine 234.4 Not Estab. mg/dL    Microalb, Ur 43.7 Not Estab. ug/mL    Microalb/Crt. Ratio 19 0 - 29 mg/g creat         Assessment    1. Type 2 diabetes mellitus with other specified complication, with long-term current use of insulin    2. Hot flashes    3. Excessive sweating    4. Fibromyalgia    5. Hyperlipidemia, unspecified hyperlipidemia type    6. Coronary artery disease involving coronary bypass graft of native heart without angina pectoris    7. Hypertension, unspecified type          Fatou    Jorge was seen today for follow-up.    Diagnoses and all orders for this visit:    Type 2 diabetes mellitus with other  specified complication, with long-term current use of insulin  -     semaglutide (OZEMPIC) 1 mg/dose (4 mg/3 mL); Inject 1 mg into the skin every 7 days.  -     insulin glargine U-300 conc (TOUJEO MAX U-300 SOLOSTAR) 300 unit/mL (3 mL) insulin pen; Inject 20 Units into the skin once daily.  -     Hemoglobin A1C; Future  -     Hemoglobin A1C  -     metFORMIN (GLUCOPHAGE-XR) 500 MG ER 24hr tablet; Take 2 tablets (1,000 mg total) by mouth 2 (two) times daily with meals.    Hot flashes  -     FOLLICLE STIMULATING HORMONE; Future  -     ESTRADIOL; Future  -     Testosterone; Future  -     FOLLICLE STIMULATING HORMONE  -     ESTRADIOL  -     Testosterone    Excessive sweating  -     FOLLICLE STIMULATING HORMONE; Future  -     ESTRADIOL; Future  -     Testosterone; Future  -     FOLLICLE STIMULATING HORMONE  -     ESTRADIOL  -     Testosterone    Fibromyalgia  -     DULoxetine (CYMBALTA) 30 MG capsule; Take 1 capsule (30 mg total) by mouth once daily.    Hyperlipidemia, unspecified hyperlipidemia type  -     pravastatin (PRAVACHOL) 20 MG tablet; Take 1 tablet (20 mg total) by mouth once daily.    Coronary artery disease involving coronary bypass graft of native heart without angina pectoris    Hypertension, unspecified type  -     amLODIPine (NORVASC) 5 MG tablet; Take 1 tablet (5 mg total) by mouth every evening.  -     metoprolol succinate (TOPROL-XL) 100 MG 24 hr tablet; Take 1 tablet (100 mg total) by mouth every evening.  -     olmesartan-hydrochlorothiazide (BENICAR HCT) 40-25 mg per tablet; Take 1 tablet by mouth once daily.    Other orders  -     pantoprazole (PROTONIX) 40 MG tablet; Take 1 tablet (40 mg total) by mouth once daily.      Recently had labs done.  CBC, CMP, TSH, lipids all look fine.    Copy of labs faxed to her cardiologist, Dr. Mercedes.    Up-to-date on screening items.      Unfortunately, A1c has gone from 6.8%, to 7.3%, now at 8.3%.  She states that she lowered her Basaglar to 18 units, but has  been out for the last couple of weeks.  This would have been around the time that she had the blood work done, so looks like blood sugar is elevated even on the insulin.    We will have her try to fill out patient assistance program info for Fabiola Nordisk (Ozempic, Tresiba), but likelihood of getting approved is slim as she has not on Medicare.  Regardless, they should me the income qualifications, so we will try anyway.      In the meantime, will have her go to 20 units of Toujeo daily.  Check blood sugar every three days, a.m. fasting.  As long as it is over 180, we will have her increase by 2 units every three days.      Increase Ozempic to 1 mg weekly  Continue metformin 1000 mg, twice a day    Recheck A1c in three months.  If still above goal, we will have her see [facility].    When we recheck the A1c, we can check some hormones.  May have her see Dr. Lozano for HRT.    Continue other medications, as prescribed.    FOLLOW-UP:  Follow up in about 3 months (around 10/8/2024) for recheck, labs 1 week prior.    I spent a total of 45 minutes face to face and non-face to face on the date of this visit.This includes time preparing to see the patient (eg, review of tests, notes), obtaining and/or reviewing additional history from an independent historian and/or outside medical records, documenting clinical information in the electronic health record, independently interpreting results and/or communicating results to the patient/family/caregiver, or care coordinator.  Visit today included increased complexity associated with the care of the episodic problem addressed and managing the longitudinal care of the patient due to the serious and/or complex managed problem(s).    Signed by:  Ahsan Tripp MD

## 2024-07-31 DIAGNOSIS — I10 HYPERTENSION, UNSPECIFIED TYPE: ICD-10-CM

## 2024-07-31 DIAGNOSIS — E11.69 TYPE 2 DIABETES MELLITUS WITH OTHER SPECIFIED COMPLICATION, WITH LONG-TERM CURRENT USE OF INSULIN: ICD-10-CM

## 2024-07-31 DIAGNOSIS — Z79.4 TYPE 2 DIABETES MELLITUS WITH OTHER SPECIFIED COMPLICATION, WITH LONG-TERM CURRENT USE OF INSULIN: ICD-10-CM

## 2024-07-31 DIAGNOSIS — M79.7 FIBROMYALGIA: ICD-10-CM

## 2024-07-31 DIAGNOSIS — E78.5 HYPERLIPIDEMIA, UNSPECIFIED HYPERLIPIDEMIA TYPE: ICD-10-CM

## 2024-07-31 RX ORDER — METFORMIN HYDROCHLORIDE 500 MG/1
TABLET, EXTENDED RELEASE ORAL
Refills: 0 | OUTPATIENT
Start: 2024-07-31

## 2024-07-31 RX ORDER — DULOXETIN HYDROCHLORIDE 30 MG/1
CAPSULE, DELAYED RELEASE ORAL
Refills: 0 | OUTPATIENT
Start: 2024-07-31

## 2024-07-31 RX ORDER — PRAVASTATIN SODIUM 20 MG/1
TABLET ORAL
Refills: 0 | OUTPATIENT
Start: 2024-07-31

## 2024-07-31 RX ORDER — METOPROLOL SUCCINATE 100 MG/1
TABLET, EXTENDED RELEASE ORAL
Refills: 0 | OUTPATIENT
Start: 2024-07-31

## 2024-07-31 RX ORDER — PANTOPRAZOLE SODIUM 40 MG/1
TABLET, DELAYED RELEASE ORAL
Refills: 0 | OUTPATIENT
Start: 2024-07-31

## 2024-07-31 RX ORDER — OLMESARTAN MEDOXOMIL AND HYDROCHLOROTHIAZIDE 40/25 40; 25 MG/1; MG/1
TABLET ORAL
Refills: 0 | OUTPATIENT
Start: 2024-07-31

## 2024-07-31 NOTE — TELEPHONE ENCOUNTER
Refill Decision Note   Jorge Jones  is requesting a refill authorization.  Brief Assessment and Rationale for Refill:  Quick Discontinue     Medication Therapy Plan: Pharmacy is requesting new scripts for the following medications without required information, (sig/ frequency/qty/etc)       Medication Reconciliation Completed: No   Comments: Pharmacies have been requesting medications for patients without required information, (sig, frequency, qty, etc.). In addition, requests are sent for medication(s) pt. are currently not taking, and medications patients have never taken.    We have spoken to the pharmacies about these request types and advised their teams previously that we are unable to assess these New Script requests and require all details for these requests. This is a known issue and has been reported.     No Care Gaps recommended.     Note composed:5:50 PM 07/31/2024

## 2024-07-31 NOTE — TELEPHONE ENCOUNTER
No care due was identified.  [facility] Embedded Care Due Messages. Reference number: [id number].   7/31/2024 3:59:31 PM CDT

## 2024-09-26 DIAGNOSIS — E11.69 TYPE 2 DIABETES MELLITUS WITH OTHER SPECIFIED COMPLICATION, WITH LONG-TERM CURRENT USE OF INSULIN: ICD-10-CM

## 2024-09-26 DIAGNOSIS — E78.5 HYPERLIPIDEMIA, UNSPECIFIED HYPERLIPIDEMIA TYPE: ICD-10-CM

## 2024-09-26 DIAGNOSIS — I10 HYPERTENSION, UNSPECIFIED TYPE: ICD-10-CM

## 2024-09-26 DIAGNOSIS — Z79.4 TYPE 2 DIABETES MELLITUS WITH OTHER SPECIFIED COMPLICATION, WITH LONG-TERM CURRENT USE OF INSULIN: ICD-10-CM

## 2024-09-26 DIAGNOSIS — M79.7 FIBROMYALGIA: ICD-10-CM

## 2024-09-26 NOTE — TELEPHONE ENCOUNTER
No care due was identified.  [facility] Embedded Care Due Messages. Reference number: [id number].   9/26/2024 1:30:44 PM CDT

## 2024-09-28 RX ORDER — OLMESARTAN MEDOXOMIL AND HYDROCHLOROTHIAZIDE 40/25 40; 25 MG/1; MG/1
TABLET ORAL
Refills: 0 | OUTPATIENT
Start: 2024-09-28

## 2024-09-28 RX ORDER — METFORMIN HYDROCHLORIDE 500 MG/1
TABLET, EXTENDED RELEASE ORAL
Refills: 0 | OUTPATIENT
Start: 2024-09-28

## 2024-09-28 RX ORDER — METOPROLOL SUCCINATE 100 MG/1
TABLET, EXTENDED RELEASE ORAL
Refills: 0 | OUTPATIENT
Start: 2024-09-28

## 2024-09-28 RX ORDER — DULOXETIN HYDROCHLORIDE 30 MG/1
CAPSULE, DELAYED RELEASE ORAL
Refills: 0 | OUTPATIENT
Start: 2024-09-28

## 2024-09-28 RX ORDER — PANTOPRAZOLE SODIUM 40 MG/1
TABLET, DELAYED RELEASE ORAL
Refills: 0 | OUTPATIENT
Start: 2024-09-28

## 2024-09-28 RX ORDER — PRAVASTATIN SODIUM 20 MG/1
TABLET ORAL
Refills: 0 | OUTPATIENT
Start: 2024-09-28

## 2024-09-28 RX ORDER — AMLODIPINE BESYLATE 5 MG/1
TABLET ORAL
Refills: 0 | OUTPATIENT
Start: 2024-09-28

## 2024-09-28 NOTE — TELEPHONE ENCOUNTER
[facility] Note  Quick DC. Inappropriate Request   Refill request requires further review by MD: NO   Medication Therapy Plan: Pharmacy is requesting new script(s) for the following medications without required information, (sig/ frequency/qty/etc)     [facility] action(s):  Quick Discontinue      Duplicate Pended Encounter(s)/ Last Prescribed Details:    Pharmacies have been requesting medications for patients without required information, (sig, frequency, qty, etc.). In addition, requests are sent for medication(s) pt. are currently not taking, and medications patients have never taken.    We have spoken to the pharmacies about these request types and advised their teams previously that we are unable to assess these New Script requests and require all details for these requests. This is a known issue and has been reported.        Medication related problems are not assessed for [facility].   Medication Reconciliation Completed? NO Were there pending details that required adjustment? NO     Automatic Epic Generated Protocol Data Below:   Requested Prescriptions     Pending Prescriptions Disp Refills    metFORMIN (GLUCOPHAGE-XR) 500 MG ER 24hr tablet [Pharmacy Med Name: METFORMIN HCL ER TABS 500MG]  0    amLODIPine (NORVASC) 5 MG tablet [Pharmacy Med Name: AMLODIPINE BESYLATE TABS 5MG]  0    pravastatin (PRAVACHOL) 20 MG tablet [Pharmacy Med Name: PRAVASTATIN TABS 20MG]  0    pantoprazole (PROTONIX) 40 MG tablet [Pharmacy Med Name: PANTOPRAZOLE SODIUM DR TABS 40MG]  0    metoprolol succinate (TOPROL-XL) 100 MG 24 hr tablet [Pharmacy Med Name: METOPROLOL SUCCINATE ER TABS 100MG]  0    DULoxetine (CYMBALTA) 30 MG capsule [Pharmacy Med Name: DULOXETINE HCL DR CAPS 30MG]  0    olmesartan-hydrochlorothiazide (BENICAR HCT) 40-25 mg per tablet [Pharmacy Med Name: OLMESARTAN/HCTZ TABS 40/25MG]  0              Appointments      Date Provider   Last Visit   7/8/2024 Ahsan Tripp MD   Next Visit   Visit date not  found Ahsan Tripp MD        Note composed:11:19 AM 09/28/2024

## 2024-10-10 ENCOUNTER — PATIENT OUTREACH (OUTPATIENT)
Dept: ADMINISTRATIVE | Facility: HOSPITAL | Age: 59
End: 2024-10-10
Payer: COMMERCIAL

## 2024-10-10 NOTE — PROGRESS NOTES
VBHM Score: 3     Eye Exam  Hemoglobin A1c  Foot Exam            Tried calling patient in regards to eye exam, PCP follow up and A1c lab. LVM and sent portal message.

## 2024-10-15 ENCOUNTER — OFFICE VISIT (OUTPATIENT)
Dept: PRIMARY CARE CLINIC | Facility: CLINIC | Age: 59
End: 2024-10-15
Payer: COMMERCIAL

## 2024-10-15 VITALS
HEART RATE: 85 BPM | RESPIRATION RATE: 18 BRPM | HEIGHT: 64 IN | WEIGHT: 214.06 LBS | TEMPERATURE: 98 F | DIASTOLIC BLOOD PRESSURE: 72 MMHG | SYSTOLIC BLOOD PRESSURE: 120 MMHG | OXYGEN SATURATION: 98 % | BODY MASS INDEX: 36.55 KG/M2

## 2024-10-15 DIAGNOSIS — R23.2 HOT FLASHES: ICD-10-CM

## 2024-10-15 DIAGNOSIS — Z79.4 TYPE 2 DIABETES MELLITUS WITH OTHER SPECIFIED COMPLICATION, WITH LONG-TERM CURRENT USE OF INSULIN: Primary | ICD-10-CM

## 2024-10-15 DIAGNOSIS — E66.01 SEVERE OBESITY (BMI 35.0-39.9) WITH COMORBIDITY: ICD-10-CM

## 2024-10-15 DIAGNOSIS — I10 HYPERTENSION, UNSPECIFIED TYPE: ICD-10-CM

## 2024-10-15 DIAGNOSIS — R51.9 FACIAL PAIN: ICD-10-CM

## 2024-10-15 DIAGNOSIS — M79.7 FIBROMYALGIA: ICD-10-CM

## 2024-10-15 DIAGNOSIS — E11.69 TYPE 2 DIABETES MELLITUS WITH OTHER SPECIFIED COMPLICATION, WITH LONG-TERM CURRENT USE OF INSULIN: Primary | ICD-10-CM

## 2024-10-15 PROCEDURE — 3061F NEG MICROALBUMINURIA REV: CPT | Mod: CPTII,S$GLB,, | Performed by: PHYSICIAN ASSISTANT

## 2024-10-15 PROCEDURE — 3052F HG A1C>EQUAL 8.0%<EQUAL 9.0%: CPT | Mod: CPTII,S$GLB,, | Performed by: PHYSICIAN ASSISTANT

## 2024-10-15 PROCEDURE — 99213 OFFICE O/P EST LOW 20 MIN: CPT | Mod: S$GLB,,, | Performed by: PHYSICIAN ASSISTANT

## 2024-10-15 PROCEDURE — 3074F SYST BP LT 130 MM HG: CPT | Mod: CPTII,S$GLB,, | Performed by: PHYSICIAN ASSISTANT

## 2024-10-15 PROCEDURE — G2211 COMPLEX E/M VISIT ADD ON: HCPCS | Mod: S$GLB,,, | Performed by: PHYSICIAN ASSISTANT

## 2024-10-15 PROCEDURE — 1159F MED LIST DOCD IN RCRD: CPT | Mod: CPTII,S$GLB,, | Performed by: PHYSICIAN ASSISTANT

## 2024-10-15 PROCEDURE — 99999 PR PBB SHADOW E&M-EST. PATIENT-LVL V: CPT | Mod: PBBFAC,,, | Performed by: PHYSICIAN ASSISTANT

## 2024-10-15 PROCEDURE — 3066F NEPHROPATHY DOC TX: CPT | Mod: CPTII,S$GLB,, | Performed by: PHYSICIAN ASSISTANT

## 2024-10-15 PROCEDURE — 3008F BODY MASS INDEX DOCD: CPT | Mod: CPTII,S$GLB,, | Performed by: PHYSICIAN ASSISTANT

## 2024-10-15 PROCEDURE — 3078F DIAST BP <80 MM HG: CPT | Mod: CPTII,S$GLB,, | Performed by: PHYSICIAN ASSISTANT

## 2024-10-15 NOTE — PROGRESS NOTES
"    [facility]       [address] [place] [address]      Phone: [phone]      Fax: [phone]    Gill Garcia PA-C                Office Visit  10/21/2024        Subjective      HPI:  Jorge Jones is a 59 y.o. female presents today in clinic for "Follow-up  ."     Patient is a 59-year-old female who presents to clinic for diabetes checkup.    She presents today accompanied by her , Srini.     Still feels like her hormones are off.  Gets frequent hot flashes and sweats easily with activity. No night hot flashes. Manages typically with a fan.  Saw gyn in the past  who recommended black cohosh supplements.      She reports experiencing facial pain, particularly in the facial muscles. The pain has been worsening, especially at night, requiring the use of aspirin for relief. She notes that this pain is different from his previous experiences of facial discomfort during activities such as singing. She speculates that the pain may be related to his fibromyalgia or possibly TMJ.    H/O diabetes.  In the past, was taking Synjardy, Lantus.  Synjardy caused her urinary issues, so we discontinued it.  Instead, she has been taking Toujeo 24 units daily, along with metformin 1000 mg twice a day, and Ozempic 1 mg weekly (was increased last visit).  Ozempic is very expensive for her - Costing approximately $100+ per month. Patient assistance form was completed last visit, has not heard anything back on it.  Last had A1c checked 7/24 and it was at 8.2.     Has hypertension.  Also has CAD/CABG.  HLD.  Currently takes amlodipine 5 mg daily, along with Benicar HCT 40-25 mg daily.  Takes pravastatin 20 mg for cholesterol.  Cardiologist also has her on Toprol- mg daily and baby aspiri. Plavix has been stopped by cardiology.     PMH:  Dm, HTN, CAD, HLD, LAURIE, fibromyalgia  PSH:  Tonsils, BTL  FMH:  Dm, HLD, HTN, CAD/mi, AFib  Allergies:  Ceftin causes hives.  " "Bactrim/sulfa also causes hives.  Social:  Stays at home.  .  Denies T/a/D                      The following were updated and reviewed by myself in the chart: medications, past medical history, past surgical history, family history, social history, and allergies.     Medications:  Current Outpatient Medications on File Prior to Visit   Medication Sig Dispense Refill    amLODIPine (NORVASC) 5 MG tablet Take 1 tablet (5 mg total) by mouth every evening. 90 tablet 3    aspirin (ECOTRIN) 81 MG EC tablet Take 81 mg by mouth.      BD ULTRA-FINE SHORT PEN NEEDLE 31 gauge x 5/16" Ndle Inject 1 Syringe into the skin.      DULoxetine (CYMBALTA) 30 MG capsule Take 1 capsule (30 mg total) by mouth once daily. 90 capsule 3    insulin glargine U-300 conc (TOUJEO MAX U-300 SOLOSTAR) 300 unit/mL (3 mL) insulin pen Inject 20 Units into the skin once daily. (Patient taking differently: Inject 24 Units into the skin every evening.) 6 mL 3    metFORMIN (GLUCOPHAGE-XR) 500 MG ER 24hr tablet Take 2 tablets (1,000 mg total) by mouth 2 (two) times daily with meals. 360 tablet 3    metoprolol succinate (TOPROL-XL) 100 MG 24 hr tablet Take 1 tablet (100 mg total) by mouth every evening. 90 tablet 3    olmesartan-hydrochlorothiazide (BENICAR HCT) 40-25 mg per tablet Take 1 tablet by mouth once daily. 90 tablet 3    omega-3 fatty acids/fish oil (FISH OIL-OMEGA-3 FATTY ACIDS) 300-1,000 mg capsule Take 2 g by mouth.      pantoprazole (PROTONIX) 40 MG tablet Take 1 tablet (40 mg total) by mouth once daily. 90 tablet 3    pravastatin (PRAVACHOL) 20 MG tablet Take 1 tablet (20 mg total) by mouth once daily. 90 tablet 3    semaglutide (OZEMPIC) 1 mg/dose (4 mg/3 mL) Inject 1 mg into the skin every 7 days. 3 mL 12    vitamin D (VITAMIN D3) 1000 units Tab Take 1,000 Units by mouth once daily.       No current facility-administered medications on file prior to visit.        PMHx:  Past Medical History:   Diagnosis Date    Arthritis     Coronary " artery disease     Diabetes mellitus, type 2     Fibromyalgia     Hyperlipidemia     Hypertension       Patient Active Problem List    Diagnosis Date Noted    Severe obesity (BMI 35.0-39.9) with comorbidity 10/15/2024    Anxiety disorder 02/14/2024    Arteriosclerosis of coronary artery 02/14/2024    Depressive disorder 02/14/2024    High cholesterol 02/14/2024    Heart disease 02/14/2024    Uses continuous positive airway pressure (CPAP) ventilation at home 02/14/2024    Shortness of breath 06/07/2023    BMI 38.0-38.9,adult 01/05/2021    Diabetes mellitus with coincident hypertension 08/09/2017    Fibromyalgia 02/15/2017    Adrenal adenoma 05/18/2016    LAURIE on CPAP 07/08/2015    Allergic rhinitis 05/12/2014    Vitamin D deficiency disease 05/12/2014        PSHx:  Past Surgical History:   Procedure Laterality Date    CORONARY ARTERY BYPASS GRAFT      TONSILLECTOMY      TUBAL LIGATION          FHx:  Family History   Problem Relation Name Age of Onset    Diabetes Father      Hyperlipidemia Father      Diabetes Mother      Hyperlipidemia Mother      Heart attack Mother      Breast cancer Paternal Aunt      Breast cancer Paternal Cousin      Colon cancer Paternal Cousin      Ovarian cancer Neg Hx      Uterine cancer Neg Hx      Cervical cancer Neg Hx          Social:  Social History     Socioeconomic History    Marital status:    Tobacco Use    Smoking status: Never     Passive exposure: Past    Smokeless tobacco: Never   Substance and Sexual Activity    Alcohol use: Never    Drug use: Never    Sexual activity: Not Currently     Partners: Male     Birth control/protection: See Surgical Hx        Allergies:  Review of patient's allergies indicates:   Allergen Reactions    Cefuroxime Hives     No reaction to Keflex and PCN      Sulfamethoxazole-trimethoprim Hives and Nausea And Vomiting    Cefuroxime axetil Hives and Nausea And Vomiting    Empagliflozin Nausea And Vomiting    Sulfamethoxazole Hives    Trimethoprim  "Hives        ROS:  Review of Systems   Constitutional:  Negative for activity change, appetite change and unexpected weight change.   HENT:  Negative for trouble swallowing and voice change.         +facial pain   Eyes:  Negative for visual disturbance.   Respiratory:  Negative for shortness of breath.    Cardiovascular:  Negative for chest pain.   Gastrointestinal:  Negative for abdominal pain, constipation and diarrhea.   Endocrine: Negative for polydipsia, polyphagia and polyuria.        + hot flashes   Genitourinary:  Negative for decreased urine volume and difficulty urinating.   Musculoskeletal:  Negative for arthralgias and myalgias.   Skin:  Negative for rash.   Neurological:  Negative for dizziness, light-headedness and headaches.   Psychiatric/Behavioral:  Negative for dysphoric mood. The patient is not nervous/anxious.           Objective      /72   Pulse 85   Temp 97.6 °F (36.4 °C) (Oral)   Resp 18   Ht 5' 4" (1.626 m)   Wt 97.1 kg (214 lb 1.1 oz)   SpO2 98%   BMI 36.74 kg/m²   Ht Readings from Last 3 Encounters:   10/15/24 5' 4" (1.626 m)   07/08/24 5' 4" (1.626 m)   02/14/24 5' 4" (1.626 m)     Wt Readings from Last 3 Encounters:   10/15/24 97.1 kg (214 lb 1.1 oz)   07/08/24 99.2 kg (218 lb 11.1 oz)   02/14/24 97.9 kg (215 lb 13.3 oz)       PHYSICAL EXAM:  Physical Exam  Vitals and nursing note reviewed.   Constitutional:       General: She is not in acute distress.     Appearance: Normal appearance. She is obese. She is not ill-appearing.   HENT:      Head: Normocephalic and atraumatic.      Nose: Nose normal.      Mouth/Throat:      Mouth: Mucous membranes are moist.      Pharynx: Oropharynx is clear.   Eyes:      Extraocular Movements: Extraocular movements intact.      Pupils: Pupils are equal, round, and reactive to light.   Cardiovascular:      Rate and Rhythm: Normal rate and regular rhythm.      Pulses: Normal pulses.      Heart sounds: Normal heart sounds.   Pulmonary:      " Effort: Pulmonary effort is normal. No respiratory distress.      Breath sounds: Normal breath sounds.   Abdominal:      General: Abdomen is flat. Bowel sounds are normal.      Tenderness: There is no abdominal tenderness.   Musculoskeletal:         General: No deformity or signs of injury. Normal range of motion.      Cervical back: Normal range of motion.   Skin:     General: Skin is warm and dry.      Findings: No rash.   Neurological:      General: No focal deficit present.      Mental Status: She is alert.   Psychiatric:         Mood and Affect: Mood normal.            Assessment    1. Type 2 diabetes mellitus with other specified complication, with long-term current use of insulin    2. Severe obesity (BMI 35.0-39.9) with comorbidity    3. Facial pain    4. Hot flashes    5. Fibromyalgia    6. Hypertension, unspecified type          Fatou Patel was seen today for follow-up.    Diagnoses and all orders for this visit:    Type 2 diabetes mellitus with other specified complication, with long-term current use of insulin  -     Ambulatory referral/consult to Diabetic Advanced Practice Providers (Medical Management); Future  - Plan to get into diabetes clinic for continued agressive treatment to get patient to goal   - patient to check on patient assistance program response - may need to resubmit     Severe obesity (BMI 35.0-39.9) with comorbidity    Facial pain  - Normal exam   - Tylenol/motrin prn for pain flares     Hot flashes  - Can consider referral to Dr. Lozano   - Patient states she seems to be managing for now  - Contact the office if menopausal symptoms become more bothersome or worrisome.     Fibromyalgia    Hypertension, unspecified type  - Well controlled.        FOLLOW-UP:  Follow up in about 3 months (around 1/15/2025) for with me .    I spent a total of 25 minutes face to face and non-face to face on the date of this visit.This includes time preparing to see the patient (eg, review of tests,  notes), obtaining and/or reviewing additional history from an independent historian and/or outside medical records, documenting clinical information in the electronic health record, independently interpreting results and/or communicating results to the patient/family/caregiver, or care coordinator.  Visit today included increased complexity associated with the care of the episodic problem addressed and managing the longitudinal care of the patient due to the serious and/or complex managed problem(s).      Signed by:        Gill Garcia PA-C      This note was generated with the assistance of ambient listening technology. Verbal consent was obtained by the patient and accompanying visitor(s) for the recording of patient appointment to facilitate this note. I attest to having reviewed and edited the generated note for accuracy, though some syntax or spelling errors may persist. Please contact the author of this note for any clarification.

## 2024-10-21 ENCOUNTER — PATIENT MESSAGE (OUTPATIENT)
Dept: PRIMARY CARE CLINIC | Facility: CLINIC | Age: 59
End: 2024-10-21
Payer: COMMERCIAL

## 2024-11-11 ENCOUNTER — OFFICE VISIT (OUTPATIENT)
Dept: DIABETES | Facility: CLINIC | Age: 59
End: 2024-11-11
Payer: COMMERCIAL

## 2024-11-11 ENCOUNTER — TELEPHONE (OUTPATIENT)
Dept: PHARMACY | Facility: CLINIC | Age: 59
End: 2024-11-11
Payer: COMMERCIAL

## 2024-11-11 VITALS
SYSTOLIC BLOOD PRESSURE: 134 MMHG | BODY MASS INDEX: 37.54 KG/M2 | DIASTOLIC BLOOD PRESSURE: 62 MMHG | WEIGHT: 218.69 LBS | HEART RATE: 86 BPM

## 2024-11-11 DIAGNOSIS — Z79.4 UNCONTROLLED TYPE 2 DIABETES MELLITUS WITH HYPERGLYCEMIA, WITH LONG-TERM CURRENT USE OF INSULIN: Primary | ICD-10-CM

## 2024-11-11 DIAGNOSIS — E11.69 HYPERLIPIDEMIA ASSOCIATED WITH TYPE 2 DIABETES MELLITUS: ICD-10-CM

## 2024-11-11 DIAGNOSIS — G47.33 OSA ON CPAP: ICD-10-CM

## 2024-11-11 DIAGNOSIS — E78.5 HYPERLIPIDEMIA ASSOCIATED WITH TYPE 2 DIABETES MELLITUS: ICD-10-CM

## 2024-11-11 DIAGNOSIS — Z99.89 USES CONTINUOUS POSITIVE AIRWAY PRESSURE (CPAP) VENTILATION AT HOME: ICD-10-CM

## 2024-11-11 DIAGNOSIS — I25.10 CORONARY ARTERY DISEASE INVOLVING NATIVE CORONARY ARTERY OF NATIVE HEART WITHOUT ANGINA PECTORIS: ICD-10-CM

## 2024-11-11 DIAGNOSIS — E11.65 UNCONTROLLED TYPE 2 DIABETES MELLITUS WITH HYPERGLYCEMIA, WITH LONG-TERM CURRENT USE OF INSULIN: Primary | ICD-10-CM

## 2024-11-11 DIAGNOSIS — I10 ESSENTIAL HYPERTENSION: ICD-10-CM

## 2024-11-11 DIAGNOSIS — E55.9 VITAMIN D DEFICIENCY DISEASE: ICD-10-CM

## 2024-11-11 DIAGNOSIS — Z95.1 HX OF CABG: ICD-10-CM

## 2024-11-11 LAB — GLUCOSE SERPL-MCNC: 280 MG/DL (ref 70–110)

## 2024-11-11 PROCEDURE — 3078F DIAST BP <80 MM HG: CPT | Mod: CPTII,S$GLB,, | Performed by: NURSE PRACTITIONER

## 2024-11-11 PROCEDURE — 3061F NEG MICROALBUMINURIA REV: CPT | Mod: CPTII,S$GLB,, | Performed by: NURSE PRACTITIONER

## 2024-11-11 PROCEDURE — 99203 OFFICE O/P NEW LOW 30 MIN: CPT | Mod: S$GLB,,, | Performed by: NURSE PRACTITIONER

## 2024-11-11 PROCEDURE — 3075F SYST BP GE 130 - 139MM HG: CPT | Mod: CPTII,S$GLB,, | Performed by: NURSE PRACTITIONER

## 2024-11-11 PROCEDURE — 82962 GLUCOSE BLOOD TEST: CPT | Mod: S$GLB,,, | Performed by: NURSE PRACTITIONER

## 2024-11-11 PROCEDURE — 99999 PR PBB SHADOW E&M-EST. PATIENT-LVL V: CPT | Mod: PBBFAC,,, | Performed by: NURSE PRACTITIONER

## 2024-11-11 PROCEDURE — 3008F BODY MASS INDEX DOCD: CPT | Mod: CPTII,S$GLB,, | Performed by: NURSE PRACTITIONER

## 2024-11-11 PROCEDURE — 3052F HG A1C>EQUAL 8.0%<EQUAL 9.0%: CPT | Mod: CPTII,S$GLB,, | Performed by: NURSE PRACTITIONER

## 2024-11-11 PROCEDURE — 3066F NEPHROPATHY DOC TX: CPT | Mod: CPTII,S$GLB,, | Performed by: NURSE PRACTITIONER

## 2024-11-11 PROCEDURE — 1159F MED LIST DOCD IN RCRD: CPT | Mod: CPTII,S$GLB,, | Performed by: NURSE PRACTITIONER

## 2024-11-11 RX ORDER — BLOOD-GLUCOSE SENSOR
EACH MISCELLANEOUS
Qty: 3 EACH | Refills: 11 | Status: SHIPPED | OUTPATIENT
Start: 2024-11-11

## 2024-11-11 RX ORDER — INSULIN GLARGINE 300 [IU]/ML
30 INJECTION, SOLUTION SUBCUTANEOUS DAILY
Qty: 6 ML | Refills: 5 | Status: SHIPPED | OUTPATIENT
Start: 2024-11-11

## 2024-11-11 RX ORDER — INSULIN ASPART 100 [IU]/ML
10 INJECTION, SOLUTION INTRAVENOUS; SUBCUTANEOUS
Qty: 15 ML | Refills: 5 | Status: SHIPPED | OUTPATIENT
Start: 2024-11-11

## 2024-11-11 NOTE — PROGRESS NOTES
Subjective:         Patient ID: Jorge Jones is a 59 y.o. female.  Patient's current PCP is Ahsan Tripp MD.   Collaborating Physician: ZHOU Ruiz MD     Chief Complaint: Diabetes Mellitus    HPI  Jorge Jones is a 59 y.o. White female presenting for a new consult for diabetes. Patient has been diagnosed with type 2 diabetes for several years.  Received diabetes education: Yes - nothing recent    CURRENT DM MEDICATIONS:   Diabetes Medications               insulin glargine U-300 conc (TOUJEO MAX U-300 SOLOSTAR) 300 unit/mL (3 mL) insulin pen Inject 20 Units into the skin once daily. 26 units     metFORMIN (GLUCOPHAGE-XR) 500 MG ER 24hr tablet Take 2 tablets (1,000 mg total) by mouth 2 (two) times daily with meals.    semaglutide (OZEMPIC) 1 mg/dose (4 mg/3 mL) Inject 1 mg into the skin every 7 days. Will have to stop due to cost.      Past failed treatment include: Synjardy- side effects; Jardiance-vomiting; Trulicity-nausea and vomiting; Tresiba-edema;     Blood glucose testing is performed regularly. Patient is testing 1 times per day.  Meter: Did not bring to appt // Referral for Dexcom today  Preferred lab: [facility]    Any episodes of hypoglycemia? Denies     Complications related to diabetes: cardiovascular disease    Her blood sugar in the clinic today was:   Lab Results   Component Value Date    POCGLU 280 (A) 11/11/2024     Jorge Jones presents today to establish care for diabetes management.     Here today with her , Srini, who is also establishing care. No meter/logs today. Reports checking typically once per day, in the morning: Fasting glucose around 120s-140s. We discussed benefits of CGM and she would like to see if Dexcom is affordable.     Unfortunately, she has a high deductible plan and cost is an issue. She has almost finished her last Ozempic pen, then will have to stop due to cost. She has tolerated well from a GI perspective.    Currently glucose of 280 approximately 2 hours post  "lunch. We discussed need to initiate mealtime insulin. Reviewed MOA and hypoglycemia precautions, and importance of site rotation for injections. Reviewed the diabetic diet and glycemic targets.     Reports eye exam UTD- KAREN today. Foot exam deferred-time constraints.    CHF,CAD, h/o CABG x3 - Dr. Mercedes     Current diet: Bfast- mostly out to eat; Typically 2 meals/day, often skipping lunch. Snack around 2-3: chips,cookies. Supper-typically eats at home, sometimes out to eat.   Activity Level: No regualr exercise- does have stationary bike at home, not using currently.     Lab Results   Component Value Date    HGBA1C 8.2 (H) 10/11/2024    HGBA1C 8.3 (H) 07/05/2024    HGBA1C 7.3 (H) 01/26/2024       STANDARDS OF CARE  Diabetes Management Status    Statin: Taking  ACE/ARB: Taking    Screening or Prevention Patient's value Goal Complete/Controlled?   HgA1C Testing and Control   Lab Results   Component Value Date    HGBA1C 8.2 (H) 10/11/2024      Annually/Less than 8% No   Lipid profile : 07/05/2024 Annually Yes   LDL control Lab Results   Component Value Date    LDLCALC 63 07/05/2024    Annually/Less than 100 mg/dl  Yes   Nephropathy screening Lab Results   Component Value Date    LABMICR 19 07/05/2024     No results found for: "PROTEINUA"  No results found for: "UTPCR"   Annually Yes   Blood pressure BP Readings from Last 1 Encounters:   11/11/24 134/62    Less than 140/90 Yes   Dilated retinal exam : 08/22/2023 Annually Yes- K[facility]EN today- [facility]   Foot exam   : 08/10/2023 Annually No Deferred- time constraints      Labs reviewed and are noted below.    Lab Results   Component Value Date    WBC 9.6 07/05/2024    HGB 13.3 07/05/2024    HCT 41.9 07/05/2024     07/05/2024    CHOL 115 07/05/2024    TRIG 93 07/05/2024    HDL 34 (L) 07/05/2024    LDLCALC 63 07/05/2024    ALT 26 07/05/2024    AST 22 07/05/2024     07/05/2024    K 4.4 07/05/2024    CL 98 07/05/2024    CREATININE 0.81 07/05/2024    BUN " "13 07/05/2024    CO2 26 07/05/2024    TSH 1.520 07/05/2024     (H) 07/05/2024    MICROALBUR 43.7 07/05/2024     Lab Results   Component Value Date    TSH 1.520 07/05/2024    TESTOSTERONE 22 10/11/2024    FERRITIN 68 04/16/2020    HXGTBGJH70 381 06/05/2020    CALCIUM 10.2 07/05/2024     No results found for: "CPEPTIDE"  No results found for: "GLUTAMICACID"  Glucose   Date Value Ref Range Status   07/05/2024 220 (H) 70 - 99 mg/dL Final       The following portions of the patient's history were reviewed and updated as appropriate: allergies, current medications, past family history, past medical history, past social history, past surgical history, and problem list.    Review of patient's allergies indicates:   Allergen Reactions    Cefuroxime Hives     No reaction to Keflex and PCN      Sulfamethoxazole-trimethoprim Hives and Nausea And Vomiting    Cefuroxime axetil Hives and Nausea And Vomiting    Empagliflozin Nausea And Vomiting    Sulfamethoxazole Hives    Trimethoprim Hives     Social History     Socioeconomic History    Marital status:    Tobacco Use    Smoking status: Never     Passive exposure: Past    Smokeless tobacco: Never   Substance and Sexual Activity    Alcohol use: Never    Drug use: Never    Sexual activity: Not Currently     Partners: Male     Birth control/protection: See Surgical Hx     Past Medical History:   Diagnosis Date    Arthritis     Coronary artery disease     Diabetes mellitus, type 2     Fibromyalgia     Hyperlipidemia     Hypertension        REVIEW OF SYSTEMS:  Eyes No history of DR.  Cardiovascular: History of HTN,HLD,CAD,CHF  GI: Tolerating Ozempic well without GI side effects.   : No CKD.   Neuro: No neuropathy.  PSYCH: No tobacco use.  ENDO: See HPI.        Objective:      Vitals:    11/11/24 1318   BP: 134/62   Pulse: 86     RESPIRATORY: No respiratory distress  NEUROLOGIC: Cranial nerves II-XII grossly intact.   PSYCHIATRIC: Alert & oriented x3. Normal mood and " affect.  FOOT EXAMINATION: Deferred  Assessment:       1. Uncontrolled type 2 diabetes mellitus with hyperglycemia, with long-term current use of insulin    2. Vitamin D deficiency disease    3. LAURIE on CPAP    4. Hyperlipidemia associated with type 2 diabetes mellitus    5. Essential hypertension    6. Coronary artery disease involving native coronary artery of native heart without angina pectoris    7. Hx of CABG    8. Uses continuous positive airway pressure (CPAP) ventilation at home        Plan:   Uncontrolled type 2 diabetes mellitus with hyperglycemia, with long-term current use of insulin  -     Ambulatory referral/consult to Diabetic Advanced Practice Providers (Medical Management)  -     POCT Glucose, Hand-Held Device  -     DEXCOM G7 SENSOR Isha; Change sensor every 10 days  Dispense: 3 each; Refill: 11  -     NOVOLOG FLEXPEN U-100 INSULIN 100 unit/mL (3 mL) InPn pen; Inject 10 Units into the skin 3 (three) times daily with meals. Plus sliding scale if needed  Dispense: 15 mL; Refill: 5  -     Ambulatory referral/consult to Diabetes Education; Future; Expected date: 11/11/2024  -     insulin glargine U-300 conc (TOUJEO MAX U-300 SOLOSTAR) 300 unit/mL (3 mL) insulin pen; Inject 30 Units into the skin once daily.  Dispense: 6 mL; Refill: 5  -     Ambulatory referral/consult to Pharmacy Assistance; Future; Expected date: 11/11/2024    Chronic -     Referral Dexcom G7 - you will need formal training on this.    -- Medications adjusted for today's visit include:    Increase Toujeo 30 units once a day.     Start Novolog 10 units before each main meal. Do not take Novolog if your meal is skipped or is carb free.    Finish Ozempic, then stop due to cost - referral in for pharmacy assistance.    Continue Metformin 2 tablets twice a day.       Vitamin D deficiency disease    LAURIE on CPAP    Hyperlipidemia associated with type 2 diabetes mellitus    Essential hypertension    Coronary artery disease involving native  "coronary artery of native heart without angina pectoris    Hx of CABG    Uses continuous positive airway pressure (CPAP) ventilation at home      - Follow up: 6 weeks      Portions of this note may have been created with voice recognition software. Occasional "wrong-word" or "sound-a-like" substitutions may have occurred due to the inherent limitations of voice recognition software. Please, read the note carefully and recognize, using context, where substitutions have occurred.           Melody Barnes,RONAK-C, BC-ADM  [facility]  "

## 2024-11-13 NOTE — TELEPHONE ENCOUNTER
We have reached out to Ms. Jones to inform her of the [employer] application process for Toujeo Pen and Ozempic whats required to apply.  She did not answer.         I mailed a letter      Follow-up will be made in 5 business days.    Nils Reis  Pharmacy Patient Assistance Team

## 2024-11-15 ENCOUNTER — TELEPHONE (OUTPATIENT)
Dept: DIABETES | Facility: CLINIC | Age: 59
End: 2024-11-15
Payer: COMMERCIAL

## 2024-11-15 NOTE — TELEPHONE ENCOUNTER
Will keep original appt.     ----- Message from Mariely sent at 11/15/2024 11:28 AM CST -----  .Name of Caller:.Jorge Jones   Best Call Back Number:.[phone]    Additional Information: Patient received the dexcom and would like to schedule an appointment to teach her how to use it.

## 2024-11-16 NOTE — PROGRESS NOTES
Mammogram, US & Additional views uploaded and linked.     
Duplicate pap smear 10/22/2019 received. Pt not due until 10/2023.  Manually uploaded and hyper-linked FITKIT 1/18/2023  Mammogram, US & Additional views sent to [facility] to upload.     
KAREN uploaded and faxed to Dr. Jami Brewer for Mammogram/Pap Smear/Colonoscopy    
No

## 2024-11-25 ENCOUNTER — CLINICAL SUPPORT (OUTPATIENT)
Dept: DIABETES | Facility: CLINIC | Age: 59
End: 2024-11-25
Payer: COMMERCIAL

## 2024-11-25 VITALS — HEIGHT: 64 IN | WEIGHT: 212.5 LBS | BODY MASS INDEX: 36.28 KG/M2

## 2024-11-25 DIAGNOSIS — Z79.4 UNCONTROLLED TYPE 2 DIABETES MELLITUS WITH HYPERGLYCEMIA, WITH LONG-TERM CURRENT USE OF INSULIN: Primary | ICD-10-CM

## 2024-11-25 DIAGNOSIS — E11.65 UNCONTROLLED TYPE 2 DIABETES MELLITUS WITH HYPERGLYCEMIA, WITH LONG-TERM CURRENT USE OF INSULIN: Primary | ICD-10-CM

## 2024-11-25 PROCEDURE — 99999 PR PBB SHADOW E&M-EST. PATIENT-LVL II: CPT | Mod: PBBFAC,,, | Performed by: DIETITIAN, REGISTERED

## 2024-11-25 NOTE — PROGRESS NOTES
"Diabetes Care Specialist Progress Note  Author: Ana Overton RD  Date: 11/27/2024    Intake    Program Intake  Reason for Diabetes Program Visit:: Initial Diabetes Assessment  Type of Intervention:: Individual  Individual: Device Training, Education  Education: Self-Management Skill Review  Device Training: Personal CGM (starting Dexcom G7)  Current diabetes risk level:: moderate  In the last month, have you used the ER or been admitted to the hospital: No  Permission to speak with others about care:: yes    Current Diabetes Treatment: Oral Medications, DM Injectables, Insulin  Oral Medication Type/Dose: metformin XR 1000mg BID  DM Injectables Type/Dose: Ozempic 1 mg - has one dose left; too expensive so won't get refill  Method of insulin delivery?: Injections  Injection Type: Pens  Pen Type/Dose: Toujeo, 24 units (down from 26 units)  //  Novolog, 5-10 units depending on meal    Continuous Glucose Monitoring  Patient has CGM: No    Lab Results   Component Value Date    HGBA1C 8.2 (H) 10/11/2024       Weight: 96.4 kg (212 lb 8.4 oz)   Height: 5' 4" (162.6 cm)   Body mass index is 36.48 kg/m².    Lifestyle Coping Support & Clinical    Lifestyle/Coping/Support  Compared to other people your age, how would you rate your health?: Fair  Does anyone in your family have diabetes or does anyone in your family support you in your diabetes care?: pt has several family members with DM and her  had DM  List anything about Diabetes that causes you stress?: family members take their meds and eat what they want and no chronic complications  How do you deal with stress/distress?: family support, prayer  Learning Barriers:: None  Culture or Voodoo beliefs that may impact ability to access healthcare: No  Psychosocial/Coping Skills Assessment Completed: : Yes  Assessment indicates:: Adequate understanding  Area of need?: No    Problem Review  Active Comorbidities: Hyperlipidemia/Dyslipidemia, Cardiovascular Disease, " Hypertension, Mental Health Condition(s)    Diabetes Self-Management Skills Assessment    Medication Skills Assessment  Patient is able to identify current diabetes medications, dosages, and appropriate timing of medications.: yes  Patient reports problems or concerns with current medication regimen.: no (pt feels a lot better since starting Novolog - she is less hungry)  Patient is  aware that some diabetes medications can cause low blood sugar?: Yes  Medication Skills Assessment Completed:: Yes  Assessment indicates:: Knowledge deficit (will explain MOA of all meds, tulio insulin)  Area of need?: Yes    Diabetes Disease Process/Treatment Options  Diabetes Type?: Type II  When were you diagnosed?: over 15 years ago  If previous diabetes education, when/where:: unsure when/where  What are your goals for this education session?: learn to use Dexcom G7  Is patient aware of what causes diabetes?: Yes  Does patient understand the pathophysiology of diabetes?: No  Diabetes Disease Process/Treatment Options: Skills Assessment Completed: Yes  Assessment indicates:: Knowledge deficit  Area of need?: Yes    Nutrition/Healthy Eating  Meal Plan 24 Hour Recall - Breakfast: often an Atkins shake  Patient can identify foods that impact blood sugar.: yes (needs review of portion sizes and recommended number of servings)  Nutrition/Healthy Eating Skills Assessment Completed:: Yes  Assessment indicates:: Knowledge deficit  Area of need?: Yes    Physical Activity/Exercise  Physical Activity/Exercise Skills Assessment Completed: : No  Deffered due to:: Time  Area of need?: Deferred    Home Blood Glucose Monitoring  Patient states that blood sugar is checked at home daily.: yes  Monitoring Method:: home glucometer  Home glucometer meter type:: accucheck avia  Fasting BG range history:: 120s-140s  Premeal BG range history:: 90s-120s  How often do you check your blood sugar?: 4x/day  What is your A1c Target?: below 7  Home Blood Glucose  Monitoring Skills Assessment Completed: : Yes  Assessment indicates:: Instruction Needed  Area of need?: Yes    Acute Complications  Have you ever had hypoglycemia (low BG 70 or less)?: no  Do you know the symptoms of low blood sugar and how to treat?: never had any symptoms of low // feeling bad, jittery // treatment peppermint or sip of Coke  Have you ever had hyperglycemia (high  or more)?: yes  How often and what are your symptoms?: after open heart surgery with staph infection - no symptoms  How do you treat hyperglycemia? : take Novolog but unsure how much is needed  Have you ever had DKA?: no  Do you ever test for ketones?: no  Do you have a sick day plan?: no  Acute Complications Skills Assessment Completed: : Yes  Assessment indicates:: Knowledge deficit, Instruction Needed  Area of need?: Yes    Chronic Complications  Chronic Complications Skills Assessment Completed: : No  Deferred due to:: Time  Area of need?: Deferred      Assessment Summary and Plan    Based on today's diabetes care assessment, the following areas of need were identified:      Identified Areas of Need      Medication/Current Diabetes Treatment: Yes  Discussed MOA, onset, side effects, dosage of meds.  Explained why Novolog works best when taken before meals.     Lifestyle Coping/Support: No   Diabetes Disease Process/Treatment Options: Yes  Discussed pathology of Type 2 diabetes, risk factors and treatment options.      Nutrition/Healthy Eating: Yes   Reviewed the sources and role of Carbohydrate, Protein, and Fat and how each nutrient impacts blood sugar.  Reviewed the importance of balancing carbohydrates with each meal using portion control techniques to count servings of carbohydrate.  Recommended replacing beverages containing high sugar content with noncaloric/sugar free options and/or water.  Discussed strategies for choosing healthier options when planning meals or dining out.   Explained how to count carbohydrates using  "the food label to identify serving size and amount of total carbs per serving and the use of dry measuring cups for accurate carb counting.  Provided visual examples using dry measuring cups, food models, and other familiar objects such as fist, tennis ball etc. to help with visualization of portions.     Physical Activity/Exercise: Deferred    Home Blood Glucose Monitoring: Yes   See care plan      Acute Complications: Yes   Reviewed blood glucose goals, prevention, detection, signs and symptoms, and treatment of hypoglycemia and hyperglycemia, and when to contact the clinic.     Chronic Complications: Deferred       Today's interventions were provided through individual discussion, instruction, and written materials were provided.      Patient verbalized understanding of instruction and written materials.  Pt was able to return back demonstration of instructions today. Patient understood key points, needs reinforcement and further instruction.     Diabetes Self-Management Care Plan:    Today's Diabetes Self-Management Care Plan was developed with Jorge's input. Jorge has agreed to work toward the following goal(s) to improve his/her overall diabetes control.      Care Plan: Diabetes Management   Updates made since 11/28/2023 12:00 AM        Problem: Blood Glucose Self-Monitoring         Goal: Pt will use Dexcom G7 with mobile yulisa to continuously check BG    Start Date: 11/25/2024   Note:    DEXCOM G7 CGM TRAINING  Dexcom G7 mobile apps downloaded to phone. Education was provided using "Quick Start Guide" and demo device per Dexcom protocol. [facility]arity clinic data share set-up.    Patient will use Dexcom Comixology7 mobile yulisa to receive continuous glucose data.        Overview:  5 minute glucose reading updates, trending arrows, BG graph screens, reports screen,  connectivity and functions.   Menus: Trend graph, start sensor, enter BG, events, alerts, settings, replace sensor, stop sensor, and shutdown  Dexcom G7 " mobile yulisa/ Settings:                          * Urgent Low: 55 mg/dL                          * Urgent Low Soon: On                          * Low Alert: 70 mg/dL; Snooze off                          * High Alert: 250 mg/dL; Snooze off                           * Signal Loss: on                          * Brief Sensor Issue: on     Reviewed additional setting options.  Patient paired sensor using 4-digit code listed on sensor cap..    Reviewed where to find sensor insertion time and expiration date.   Reviewed appropriate calibration techniques.  Reviewed sensor site selection. Patient selected and prepped site using aseptic technique, inserted sensor, applied overlay tape and started session.   Reviewed sensor removal from site. Discussed times to remove sensor per  guidelines include MRI or diatherapy.   Patient able to demonstrate without difficulty. Encouraged to review manual and/or training videos prior to starting another sensor.   Reviewed problem solving aspects of sensor transmission/variables that can disrupt RF transmission.  range 20 feet, but the first 3 hrs keep within 3 feet of transmitter.  Patient instructed on lag time of interstitial fluid from CBG and was advised to treat hypoglycemia and dose insulin based on SMBG values.  Dexcom technical support contact number given and examples of when to contact them discussed.            Follow Up Plan     Follow up in about 2 weeks (around 12/9/2024).  Will complete assessment, review CGM report and evaluate goals    Today's care plan and follow up schedule was discussed with patient.  Jorge verbalized understanding of the care plan, goals, and agrees to follow up plan.        The patient was encouraged to communicate with his/her health care provider/physician and care team regarding his/her condition(s) and treatment.  I provided the patient with my contact information today and encouraged to contact me via phone or  [facility] Portal as needed.     Length of Visit   Total Time: 75 Minutes     11/27/24 - Dexcom clarity report downloaded and attached in media

## 2024-12-06 ENCOUNTER — PATIENT OUTREACH (OUTPATIENT)
Dept: DIABETES | Facility: CLINIC | Age: 59
End: 2024-12-06
Payer: COMMERCIAL

## 2024-12-06 NOTE — Clinical Note
She is doing great! She would like a SS for correction for the occasions that she may eat dessert or more carbs at a party, etc.  I told her you or I could send it via My Chart with directions.

## 2024-12-06 NOTE — PROGRESS NOTES
Pt here with  today for f/u after starting Dexcom.   Pt is doing very well, so cancelled f/u.     CGM report summary below; full report in media        Pt's only concern is how she should handle situations such as parties when she may eat dessert or more carbs than normal.   She will not do this often. She can treat hyperglycemia with extra water and more physical activity (nothing strenuous).   Also discussed possible SS for correction when needed.   Pt is open to a correction scale.   Will route to provider.

## 2024-12-09 ENCOUNTER — PATIENT MESSAGE (OUTPATIENT)
Dept: DIABETES | Facility: CLINIC | Age: 59
End: 2024-12-09
Payer: COMMERCIAL

## 2024-12-11 ENCOUNTER — TELEPHONE (OUTPATIENT)
Dept: PRIMARY CARE CLINIC | Facility: CLINIC | Age: 59
End: 2024-12-11
Payer: COMMERCIAL

## 2024-12-11 NOTE — TELEPHONE ENCOUNTER
----- Message from Ashlee sent at 12/11/2024 11:06 AM CST -----  Sooner Appointment Request     Caller is requesting a sooner appointment.  Caller declined first available appointment listed below.  Caller will not accept being placed on the waitlist and is requesting a message be sent to doctor.  Name of Caller:ALEJA DAWSON [[id number]]  When is the first available appointment?N/A  Symptoms: Check up for Diabetes  Would the patient rather a call back or a response via [facility]? Call please  Best Call Back Number:Telephone Information:  Mobile          [phone]

## 2024-12-17 DIAGNOSIS — E78.5 HYPERLIPIDEMIA, UNSPECIFIED HYPERLIPIDEMIA TYPE: ICD-10-CM

## 2024-12-17 DIAGNOSIS — E11.69 TYPE 2 DIABETES MELLITUS WITH OTHER SPECIFIED COMPLICATION, WITH LONG-TERM CURRENT USE OF INSULIN: ICD-10-CM

## 2024-12-17 DIAGNOSIS — Z79.4 TYPE 2 DIABETES MELLITUS WITH OTHER SPECIFIED COMPLICATION, WITH LONG-TERM CURRENT USE OF INSULIN: ICD-10-CM

## 2024-12-17 DIAGNOSIS — I10 HYPERTENSION, UNSPECIFIED TYPE: ICD-10-CM

## 2024-12-17 RX ORDER — OLMESARTAN MEDOXOMIL AND HYDROCHLOROTHIAZIDE 40/25 40; 25 MG/1; MG/1
TABLET ORAL
Refills: 0 | OUTPATIENT
Start: 2024-12-17

## 2024-12-17 RX ORDER — PRAVASTATIN SODIUM 20 MG/1
TABLET ORAL
Refills: 0 | OUTPATIENT
Start: 2024-12-17

## 2024-12-17 RX ORDER — METOPROLOL SUCCINATE 100 MG/1
TABLET, EXTENDED RELEASE ORAL
Refills: 0 | OUTPATIENT
Start: 2024-12-17

## 2024-12-17 RX ORDER — PANTOPRAZOLE SODIUM 40 MG/1
TABLET, DELAYED RELEASE ORAL
Refills: 0 | OUTPATIENT
Start: 2024-12-17

## 2024-12-17 RX ORDER — METFORMIN HYDROCHLORIDE 500 MG/1
TABLET, EXTENDED RELEASE ORAL
Refills: 0 | OUTPATIENT
Start: 2024-12-17

## 2024-12-17 NOTE — TELEPHONE ENCOUNTER
No care due was identified.  [facility] Embedded Care Due Messages. Reference number: [id number].   12/17/2024 11:41:33 AM CST

## 2024-12-18 NOTE — TELEPHONE ENCOUNTER
Refill Decision Note   Jorge Jones  is requesting a refill authorization.  Brief Assessment and Rationale for Refill:  Quick Discontinue     Medication Therapy Plan: Pharmacy is requesting new scripts for the following medications without required information, (sig/ frequency/qty/etc)     Medication Reconciliation Completed: No   Comments:     No Care Gaps recommended.     Note composed:6:13 PM 12/17/2024

## 2024-12-23 ENCOUNTER — OFFICE VISIT (OUTPATIENT)
Dept: DIABETES | Facility: CLINIC | Age: 59
End: 2024-12-23
Payer: COMMERCIAL

## 2024-12-23 VITALS
SYSTOLIC BLOOD PRESSURE: 103 MMHG | HEIGHT: 64 IN | HEART RATE: 75 BPM | DIASTOLIC BLOOD PRESSURE: 59 MMHG | WEIGHT: 212.94 LBS | BODY MASS INDEX: 36.35 KG/M2

## 2024-12-23 DIAGNOSIS — I25.10 CORONARY ARTERY DISEASE INVOLVING NATIVE CORONARY ARTERY OF NATIVE HEART WITHOUT ANGINA PECTORIS: ICD-10-CM

## 2024-12-23 DIAGNOSIS — Z79.4 UNCONTROLLED TYPE 2 DIABETES MELLITUS WITH HYPERGLYCEMIA, WITH LONG-TERM CURRENT USE OF INSULIN: Primary | ICD-10-CM

## 2024-12-23 DIAGNOSIS — Z95.1 HX OF CABG: ICD-10-CM

## 2024-12-23 DIAGNOSIS — G47.33 OSA ON CPAP: ICD-10-CM

## 2024-12-23 DIAGNOSIS — E11.65 UNCONTROLLED TYPE 2 DIABETES MELLITUS WITH HYPERGLYCEMIA, WITH LONG-TERM CURRENT USE OF INSULIN: Primary | ICD-10-CM

## 2024-12-23 DIAGNOSIS — E55.9 VITAMIN D DEFICIENCY DISEASE: ICD-10-CM

## 2024-12-23 DIAGNOSIS — E78.5 HYPERLIPIDEMIA ASSOCIATED WITH TYPE 2 DIABETES MELLITUS: ICD-10-CM

## 2024-12-23 DIAGNOSIS — I10 ESSENTIAL HYPERTENSION: ICD-10-CM

## 2024-12-23 DIAGNOSIS — E11.69 HYPERLIPIDEMIA ASSOCIATED WITH TYPE 2 DIABETES MELLITUS: ICD-10-CM

## 2024-12-23 LAB — GLUCOSE SERPL-MCNC: 115 MG/DL (ref 70–110)

## 2024-12-23 PROCEDURE — 99214 OFFICE O/P EST MOD 30 MIN: CPT | Mod: S$GLB,,, | Performed by: NURSE PRACTITIONER

## 2024-12-23 PROCEDURE — 3074F SYST BP LT 130 MM HG: CPT | Mod: CPTII,S$GLB,, | Performed by: NURSE PRACTITIONER

## 2024-12-23 PROCEDURE — G2211 COMPLEX E/M VISIT ADD ON: HCPCS | Mod: S$GLB,,, | Performed by: NURSE PRACTITIONER

## 2024-12-23 PROCEDURE — 82962 GLUCOSE BLOOD TEST: CPT | Mod: S$GLB,,, | Performed by: NURSE PRACTITIONER

## 2024-12-23 PROCEDURE — 3008F BODY MASS INDEX DOCD: CPT | Mod: CPTII,S$GLB,, | Performed by: NURSE PRACTITIONER

## 2024-12-23 PROCEDURE — 1159F MED LIST DOCD IN RCRD: CPT | Mod: CPTII,S$GLB,, | Performed by: NURSE PRACTITIONER

## 2024-12-23 PROCEDURE — 3061F NEG MICROALBUMINURIA REV: CPT | Mod: CPTII,S$GLB,, | Performed by: NURSE PRACTITIONER

## 2024-12-23 PROCEDURE — 3066F NEPHROPATHY DOC TX: CPT | Mod: CPTII,S$GLB,, | Performed by: NURSE PRACTITIONER

## 2024-12-23 PROCEDURE — 1160F RVW MEDS BY RX/DR IN RCRD: CPT | Mod: CPTII,S$GLB,, | Performed by: NURSE PRACTITIONER

## 2024-12-23 PROCEDURE — 3052F HG A1C>EQUAL 8.0%<EQUAL 9.0%: CPT | Mod: CPTII,S$GLB,, | Performed by: NURSE PRACTITIONER

## 2024-12-23 PROCEDURE — 3078F DIAST BP <80 MM HG: CPT | Mod: CPTII,S$GLB,, | Performed by: NURSE PRACTITIONER

## 2024-12-23 PROCEDURE — 99999 PR PBB SHADOW E&M-EST. PATIENT-LVL V: CPT | Mod: PBBFAC,,, | Performed by: NURSE PRACTITIONER

## 2024-12-23 NOTE — PROGRESS NOTES
Subjective:         Patient ID: Jorge Jones is a 59 y.o. female.  Patient's current PCP is Ahsan Tripp MD.   Collaborating Physician: ZHOU Ruiz MD     Chief Complaint: Diabetes Mellitus    HPI  Jorge Jones is a 59 y.o. White female presenting for a follow up for diabetes. Patient has been diagnosed with type 2 diabetes for several years.  Received diabetes education: Yes - nothing recent    CURRENT DM MEDICATIONS:   Diabetes Medications               insulin glargine U-300 conc (TOUJEO MAX U-300 SOLOSTAR) 300 unit/mL (3 mL) insulin pen Inject 30 Units into the skin once daily. 22 units     metFORMIN (GLUCOPHAGE-XR) 500 MG ER 24hr tablet Take 2 tablets (1,000 mg total) by mouth 2 (two) times daily with meals.    NOVOLOG FLEXPEN U-100 INSULIN 100 unit/mL (3 mL) InPn pen Inject 10 Units into the skin 3 (three) times daily with meals. Plus sliding scale if needed    semaglutide (OZEMPIC) 1 mg/dose (4 mg/3 mL) Inject 1 mg into the skin every 7 days. No longer taking due to cost.      Past failed treatment include: Synjardy- side effects; Jardiance-vomiting; Trulicity-nausea and vomiting; Tresiba-edema;     Blood glucose testing Continuous per Dexcom G7  Preferred lab: [facility]     Any episodes of hypoglycemia? 0% per Dexcom    Complications related to diabetes: cardiovascular disease    Her blood sugar in the clinic today was:   Lab Results   Component Value Date    POCGLU 115 (A) 12/23/2024     Jorge Jones presents today for follow up visit for diabetes management.     Per Dexcom download, for the last 14 days: Average glucose of 130 mg/dL. She is 97% in range. 3% of readings are mildly elevated, with no readings > 250. 0% hypoglycemia. SD 23 mg/dL with estimated GMI 6.4%. Glycemic control is stable. Overall there is a pattern of euglycemia. Medications will be continued as current.          Reports eye exam UTD- KAREN today.     CHF,CAD, h/o CABG x3 - Dr. Mercedes     Current diet: Bfast- mostly out to eat;  "Typically 2 meals/day, often skipping lunch. Snack around 2-3: chips,cookies. Supper-typically eats at home, sometimes out to eat.   Activity Level: No regualr exercise- does have stationary bike at home, not using currently.     Lab Results   Component Value Date    HGBA1C 8.2 (H) 10/11/2024    HGBA1C 8.3 (H) 07/05/2024    HGBA1C 7.3 (H) 01/26/2024     STANDARDS OF CARE  Diabetes Management Status    Statin: Taking  ACE/ARB: Taking    Screening or Prevention Patient's value Goal Complete/Controlled?   HgA1C Testing and Control   Lab Results   Component Value Date    HGBA1C 8.2 (H) 10/11/2024      Annually/Less than 8% No   Lipid profile : 07/05/2024 Annually Yes   LDL control Lab Results   Component Value Date    LDLCALC 63 07/05/2024    Annually/Less than 100 mg/dl  Yes   Nephropathy screening Lab Results   Component Value Date    LABMICR 19 07/05/2024     No results found for: "PROTEINUA"  No results found for: "UTPCR"   Annually Yes   Blood pressure BP Readings from Last 1 Encounters:   12/23/24 (!) 103/59    Less than 140/90 Yes   Dilated retinal exam : 08/22/2023 Annually No   Foot exam   : 08/10/2023 Annually No      Labs reviewed and are noted below.    Lab Results   Component Value Date    WBC 9.6 07/05/2024    HGB 13.3 07/05/2024    HCT 41.9 07/05/2024     07/05/2024    CHOL 115 07/05/2024    TRIG 93 07/05/2024    HDL 34 (L) 07/05/2024    LDLCALC 63 07/05/2024    ALT 26 07/05/2024    AST 22 07/05/2024     07/05/2024    K 4.4 07/05/2024    CL 98 07/05/2024    CREATININE 0.81 07/05/2024    BUN 13 07/05/2024    CO2 26 07/05/2024    TSH 1.520 07/05/2024     (H) 07/05/2024    MICROALBUR 43.7 07/05/2024     Lab Results   Component Value Date    TSH 1.520 07/05/2024    TESTOSTERONE 22 10/11/2024    FERRITIN 68 04/16/2020    MJHXAHYE24 381 06/05/2020    CALCIUM 10.2 07/05/2024     No results found for: "CPEPTIDE"  No results found for: "GLUTAMICACID"  Glucose   Date Value Ref Range Status "   07/05/2024 220 (H) 70 - 99 mg/dL Final       The following portions of the patient's history were reviewed and updated as appropriate: allergies, current medications, past family history, past medical history, past social history, past surgical history, and problem list.    Review of patient's allergies indicates:   Allergen Reactions    Cefuroxime Hives     No reaction to Keflex and PCN      Sulfamethoxazole-trimethoprim Hives and Nausea And Vomiting    Cefuroxime axetil Hives and Nausea And Vomiting    Empagliflozin Nausea And Vomiting    Sulfamethoxazole Hives    Trimethoprim Hives     Social History     Socioeconomic History    Marital status:    Tobacco Use    Smoking status: Never     Passive exposure: Past    Smokeless tobacco: Never   Substance and Sexual Activity    Alcohol use: Never    Drug use: Never    Sexual activity: Not Currently     Partners: Male     Birth control/protection: See Surgical Hx     Past Medical History:   Diagnosis Date    Arthritis     Coronary artery disease     Diabetes mellitus, type 2     Fibromyalgia     Hyperlipidemia     Hypertension        REVIEW OF SYSTEMS:  Eyes No history of DR.  Cardiovascular: History of HTN,HLD,CAD,CHF  GI: Tolerating Ozempic well without GI side effects.   : No CKD.   Neuro: No neuropathy.  PSYCH: No tobacco use.  ENDO: See HPI.        Objective:      Vitals:    12/23/24 1407   BP: (!) 103/59   Pulse: 75     RESPIRATORY: No respiratory distress  NEUROLOGIC: Cranial nerves II-XII grossly intact.   PSYCHIATRIC: Alert & oriented x3. Normal mood and affect.  FOOT EXAMINATION: Protective Sensation (w/ 10 gram monofilament):  Right: Intact  Left: Intact    Visual Inspection:  Normal -  Bilateral and Nails Intact - without Evidence of Foot Deformity- Bilateral    Pedal Pulses:   Right: Present  Left: Present    Posterior Tibialis Pulses:   Right:Present  Left: Present    Assessment:       1. Uncontrolled type 2 diabetes mellitus with hyperglycemia,  with long-term current use of insulin    2. Vitamin D deficiency disease    3. LAURIE on CPAP    4. Hyperlipidemia associated with type 2 diabetes mellitus    5. Essential hypertension    6. Hx of CABG    7. Coronary artery disease involving native coronary artery of native heart without angina pectoris        Plan:   Jorge was seen today for diabetes mellitus.    Diagnoses and all orders for this visit:    Uncontrolled type 2 diabetes mellitus with hyperglycemia, with long-term current use of insulin  -     POCT Glucose, Hand-Held Device  -     Hemoglobin A1C; Future  -     Hemoglobin A1C    Chronic -     -- Medications adjusted for today's visit include:    Continue Toujeo 22 units once daily.    Continue Novolog 10 units before each main meal. Do not take Novolog if your meal is skipped or is carb free.    Continue Metformin 2 tablets twice a day.     Continuous glucose monitoring report:    The patient's CGM was downloaded and was reviewed for the last 14 days. See HPI for interpretation & plan.      Vitamin D deficiency disease    LAURIE on CPAP    Hyperlipidemia associated with type 2 diabetes mellitus    Essential hypertension    Hx of CABG    Coronary artery disease involving native coronary artery of native heart without angina pectoris              Uncontrolled type 2 diabetes mellitus with hyperglycemia, with long-term current use of insulin  -     Ambulatory referral/consult to Diabetic Advanced Practice Providers (Medical Management)  -     POCT Glucose, Hand-Held Device  -     DEXCOM G7 SENSOR Isha; Change sensor every 10 days  Dispense: 3 each; Refill: 11  -     NOVOLOG FLEXPEN U-100 INSULIN 100 unit/mL (3 mL) InPn pen; Inject 10 Units into the skin 3 (three) times daily with meals. Plus sliding scale if needed  Dispense: 15 mL; Refill: 5  -     Ambulatory referral/consult to Diabetes Education; Future; Expected date: 11/11/2024  -     insulin glargine U-300 conc (TOUJEO MAX U-300 SOLOSTAR) 300 unit/mL (3  "mL) insulin pen; Inject 30 Units into the skin once daily.  Dispense: 6 mL; Refill: 5  -     Ambulatory referral/consult to Pharmacy Assistance; Future; Expected date: 11/11/2024    Chronic -     Referral Dexcom G7 - you will need formal training on this.    -- Medications adjusted for today's visit include:    Increase Toujeo 30 units once a day.     Start Novolog 10 units before each main meal. Do not take Novolog if your meal is skipped or is carb free.    Finish Ozempic, then stop due to cost - referral in for pharmacy assistance.    Continue Metformin 2 tablets twice a day.       Vitamin D deficiency disease    LAURIE on CPAP    Hyperlipidemia associated with type 2 diabetes mellitus    Essential hypertension    Coronary artery disease involving native coronary artery of native heart without angina pectoris    Hx of CABG    Uses continuous positive airway pressure (CPAP) ventilation at home      - Follow up: 3 months      Portions of this note may have been created with voice recognition software. Occasional "wrong-word" or "sound-a-like" substitutions may have occurred due to the inherent limitations of voice recognition software. Please, read the note carefully and recognize, using context, where substitutions have occurred.           CALVIN BurkC, BC-ADM [facility]    "

## 2024-12-23 NOTE — PATIENT INSTRUCTIONS
-- Medications adjusted for today's visit include:    Continue Toujeo 22 units once daily.    Continue Novolog 10 units before each main meal. Do not take Novolog if your meal is skipped or is carb free.    Continue Metformin 2 tablets twice a day.     -- Limit carbs to no more than 30-45 grams with each meal. Never eat carbs by themselves, always add protein. Make snacks low carb or non-carb only.    -- Continue checking blood sugar 3 times daily: Fasting blood sugar and vary your next 2 readings: Before lunch, before supper, 2 hours after any meal, or bedtime.   -Blood sugar goals should be a fasting blood sugar between , and no blood sugars throughout the day over 180 is good, less than 160 better, less than 140 perfect.    --Carry glucose tablets/soft peppermints/regular juice or Coke product with you at all times to treat a low blood sugar episode (less than 70). If your blood sugar is between 50-70, Chew 4 tablets or drink 1/2 cup of juice or regular Coke product. If your blood sugar is below 50, double the treatment. Re-check blood sugar in 15 minutes. If still low, repeat this. Always call the clinic to give an update for any low blood sugar episodes.    --Exercise as tolerated: Goal 30 minutes/day, 5 days/week. Start slowly and increase as tolerated.    --Follow-up for your next visit in 3 months.     --Please either drop off, fax, or MyChart your readings to me as needed.

## 2024-12-24 ENCOUNTER — TELEPHONE (OUTPATIENT)
Dept: PRIMARY CARE CLINIC | Facility: CLINIC | Age: 59
End: 2024-12-24
Payer: COMMERCIAL

## 2024-12-24 DIAGNOSIS — E11.65 UNCONTROLLED TYPE 2 DIABETES MELLITUS WITH HYPERGLYCEMIA, WITH LONG-TERM CURRENT USE OF INSULIN: ICD-10-CM

## 2024-12-24 DIAGNOSIS — E11.69 TYPE 2 DIABETES MELLITUS WITH OTHER SPECIFIED COMPLICATION, WITH LONG-TERM CURRENT USE OF INSULIN: ICD-10-CM

## 2024-12-24 DIAGNOSIS — Z79.4 TYPE 2 DIABETES MELLITUS WITH OTHER SPECIFIED COMPLICATION, WITH LONG-TERM CURRENT USE OF INSULIN: ICD-10-CM

## 2024-12-24 DIAGNOSIS — I10 HYPERTENSION, UNSPECIFIED TYPE: ICD-10-CM

## 2024-12-24 DIAGNOSIS — Z79.4 UNCONTROLLED TYPE 2 DIABETES MELLITUS WITH HYPERGLYCEMIA, WITH LONG-TERM CURRENT USE OF INSULIN: ICD-10-CM

## 2024-12-24 RX ORDER — INSULIN GLARGINE 300 [IU]/ML
30 INJECTION, SOLUTION SUBCUTANEOUS DAILY
Qty: 6 ML | Refills: 5 | Status: CANCELLED | OUTPATIENT
Start: 2024-12-24

## 2024-12-24 RX ORDER — METOPROLOL SUCCINATE 100 MG/1
100 TABLET, EXTENDED RELEASE ORAL NIGHTLY
Qty: 90 TABLET | Refills: 3 | Status: CANCELLED | OUTPATIENT
Start: 2024-12-24

## 2024-12-24 RX ORDER — METFORMIN HYDROCHLORIDE 500 MG/1
1000 TABLET, EXTENDED RELEASE ORAL 2 TIMES DAILY WITH MEALS
Qty: 360 TABLET | Refills: 3 | Status: CANCELLED | OUTPATIENT
Start: 2024-12-24 | End: 2025-12-24

## 2024-12-24 NOTE — TELEPHONE ENCOUNTER
----- Message from Sabra sent at 12/24/2024  8:54 AM CST -----  Contact: [facility]  Type:  RX Refill Request    Who Called:   [facility]   Refill or New Rx:   NEW RX   RX Name and Strength:    metFORMIN (GLUCOPHAGE-XR) 500 MG ER 24hr tablet   metoprolol succinate (TOPROL-XL) 100 MG 24 hr tablet   How is the patient currently taking it? (ex. 1XDay):   AS ORDERED  Is this a 30 day or 90 day RX:  90  Preferred Pharmacy with phone number:    [facility] - [address] [address]  Phone: [phone] Fax: [phone]  Local or Mail Order:   MAIL ORDER  Ordering Provider:  ALVARADO Trevino Call Back Number:  [phone]- PT    Additional Information:  [facility]  STATED PT REQUESTED TO BE FILLED BY THEM NOW NOT LOCAL.

## 2024-12-27 ENCOUNTER — TELEPHONE (OUTPATIENT)
Dept: PRIMARY CARE CLINIC | Facility: CLINIC | Age: 59
End: 2024-12-27
Payer: COMMERCIAL

## 2024-12-27 ENCOUNTER — OFFICE VISIT (OUTPATIENT)
Dept: URGENT CARE | Facility: CLINIC | Age: 59
End: 2024-12-27
Payer: COMMERCIAL

## 2024-12-27 VITALS
HEIGHT: 64 IN | BODY MASS INDEX: 36.43 KG/M2 | DIASTOLIC BLOOD PRESSURE: 62 MMHG | OXYGEN SATURATION: 96 % | TEMPERATURE: 99 F | WEIGHT: 213.38 LBS | RESPIRATION RATE: 16 BRPM | SYSTOLIC BLOOD PRESSURE: 135 MMHG | HEART RATE: 76 BPM

## 2024-12-27 DIAGNOSIS — M25.612 DECREASED SHOULDER MOBILITY, LEFT: ICD-10-CM

## 2024-12-27 DIAGNOSIS — M25.512 ACUTE PAIN OF LEFT SHOULDER: ICD-10-CM

## 2024-12-27 DIAGNOSIS — M75.02 ADHESIVE CAPSULITIS OF LEFT SHOULDER: Primary | ICD-10-CM

## 2024-12-27 RX ORDER — METHOCARBAMOL 500 MG/1
500 TABLET, FILM COATED ORAL 3 TIMES DAILY PRN
Qty: 15 TABLET | Refills: 0 | Status: SHIPPED | OUTPATIENT
Start: 2024-12-27 | End: 2025-01-01

## 2024-12-27 NOTE — PROGRESS NOTES
"Subjective:      Patient ID: Jorge Jones is a 59 y.o. female.    Vitals:  height is 5' 4" (1.626 m) and weight is 96.8 kg (213 lb 6.5 oz). Her tympanic temperature is 98.8 °F (37.1 °C). Her blood pressure is 135/62 and her pulse is 76. Her respiration is 16 and oxygen saturation is 96%.     Chief Complaint: Shoulder Pain    Patient presents with pain in the left shoulder and limited ROM that began last night and has been gradually worsening with no history of trauma. She describes the pain as a burning pain that will occasionally radiated down to her elbow and hand. She has been taking Tylenol. She is concerned about arthritis. She states she has a history of frozen shoulder in the left shoulder and fibromyalgia.    Shoulder Pain   The pain is present in the left shoulder. This is a new problem. The current episode started yesterday. There has been no history of extremity trauma. The problem occurs constantly. The problem has been gradually worsening. The quality of the pain is described as burning. The pain is at a severity of 9/10. Associated symptoms include a limited range of motion. Pertinent negatives include no fever, headaches, inability to bear weight, itching, joint locking, joint swelling, numbness, stiffness, tingling or visual symptoms. The symptoms are aggravated by activity. She has tried acetaminophen for the symptoms. Family history includes arthritis. Her past medical history is significant for diabetes. There is no history of Injuries to Extremity or migraines.       Constitution: Negative for fever.   Musculoskeletal:  Positive for pain, joint pain and abnormal ROM of joint. Negative for trauma and joint swelling.   Neurological:  Negative for headaches and numbness.      Objective:     Vitals:    12/27/24 1407   BP: 135/62   BP Location: Right arm   Patient Position: Sitting   Pulse: 76   Resp: 16   Temp: 98.8 °F (37.1 °C)   TempSrc: Tympanic   SpO2: 96%   Weight: 96.8 kg (213 lb 6.5 oz) " "  Height: 5' 4" (1.626 m)       Physical Exam   Constitutional: She is oriented to person, place, and time. She appears well-developed. She is cooperative.  Non-toxic appearance. She does not appear ill. No distress.   HENT:   Head: Normocephalic and atraumatic. Head is without abrasion, without contusion and without laceration.   Ears:   Right Ear: Hearing, tympanic membrane, external ear and ear canal normal. No hemotympanum.   Left Ear: Hearing, tympanic membrane, external ear and ear canal normal. No hemotympanum.   Nose: Nose normal. No mucosal edema, rhinorrhea or nasal deformity. No epistaxis. Right sinus exhibits no maxillary sinus tenderness and no frontal sinus tenderness. Left sinus exhibits no maxillary sinus tenderness and no frontal sinus tenderness.   Mouth/Throat: Uvula is midline, oropharynx is clear and moist and mucous membranes are normal. No trismus in the jaw. Normal dentition. No uvula swelling. No posterior oropharyngeal erythema.   Eyes: Conjunctivae, EOM and lids are normal. Pupils are equal, round, and reactive to light. Right eye exhibits no discharge. Left eye exhibits no discharge. No scleral icterus.   Neck: Trachea normal and phonation normal. Neck supple. No tracheal deviation present. No neck rigidity present. No decreased range of motion present. No spinous process tenderness present. No muscular tenderness present.   Cardiovascular: Normal rate, regular rhythm, normal heart sounds and normal pulses.   Pulmonary/Chest: Effort normal and breath sounds normal. No respiratory distress.   Abdominal: Normal appearance and bowel sounds are normal. She exhibits no distension and no mass. Soft. There is no abdominal tenderness.   Musculoskeletal:         General: No deformity.      Left shoulder: She exhibits decreased range of motion and tenderness. She exhibits no swelling, no effusion, no deformity and no laceration.      Left upper arm: Normal.   Neurological: She is alert and oriented " to person, place, and time. She has normal strength. No cranial nerve deficit or sensory deficit. She exhibits normal muscle tone. She displays no seizure activity. Coordination normal. GCS eye subscore is 4. GCS verbal subscore is 5. GCS motor subscore is 6.   Skin: Skin is warm, dry, intact, not diaphoretic and not pale. Capillary refill takes less than 2 seconds. No abrasion, No burn, No bruising and No ecchymosis   Psychiatric: Her speech is normal and behavior is normal. Judgment and thought content normal.   Nursing note and vitals reviewed.chaperone present         Assessment:     1. Adhesive capsulitis of left shoulder    2. Decreased shoulder mobility, left    3. Acute pain of left shoulder        Plan:       Adhesive capsulitis of left shoulder  -     Ambulatory referral/consult to Orthopedics    Decreased shoulder mobility, left  -     methocarbamoL (ROBAXIN) 500 MG Tab; Take 1 tablet (500 mg total) by mouth 3 (three) times daily as needed (muscle pain).  Dispense: 15 tablet; Refill: 0    Acute pain of left shoulder  -     methocarbamoL (ROBAXIN) 500 MG Tab; Take 1 tablet (500 mg total) by mouth 3 (three) times daily as needed (muscle pain).  Dispense: 15 tablet; Refill: 0          Medical Decision Making:   Initial Assessment:   VITAL SIGNS STABLE   HE WILL    NO TRAUMA   NO FALL   NO SIGNS OF INFECTION     Differential Diagnosis:   FROZEN SHOULDER VERSUS ARTHRITIS  Urgent Care Management:  See entire note for further details     Discussed with pt all pertinent  information and results. Discussed pt dx and plan of tx.   Gave pt all f/u and return to the UC instructions. All questions and concerns were addressed at this time.   Pt expresses understanding of information and instructions, and is comfortable with plan to discharge.   Pt is stable for discharge.     I discussed with patient and/or family/caretaker that evaluation in the UC does not suggest any emergent or life threatening medical  conditions requiring immediate intervention beyond what was provided in the UC, and I believe patient is safe for discharge.  Regardless, an unremarkable evaluation in the UC does not preclude the development or presence of a serious or life threatening condition. As such, patient was instructed to GO to ER immediately for any worsening or change in current symptoms.               Patient Instructions   R.I.C.E:    Rest, apply ice intermittently, compress with ace wrap, and elevate above heart level as much as possible.  Do not apply ice directly to skin. Wrap ice in cloth before applying.    OTC Tylenol (acetaminophen) up to 4,000 mg a day is safe for short periods and can be used for pain, and fever. However in high doses and prolonged use it can cause liver irritation.    OTC Ibuprofen (NSAID) is a non-steroidal anti-inflammatory that can be used for pain. However it can also cause stomach irritation if over used.    Of note: Aleve, Motrin, Advil, Mobic, meloxicam, and indomethacin are also other names of NSAIDs.    OTC Voltaren topical cream may be applied for relief, as long as you do not have any allergy to the ingredients.    You will need to follow-up with orthopedics if your symptoms persist, as we discussed.      ORTHO AMBULATORY REFERRAL ORDERED:   Someone will call with an appointment this week.  Please answer all unknown calls.  If you are not contacted within 7 days, Please call clinic  for status of appointment. ([phone])          If you have been discharged from the clinic prior to your point of care test results being completed, please make sure to check your [facility] account.  If there is a change in treatment, we will communicate with you through here.  If your test is positive, and medications are ordered, these will be sent to your preferred pharmacy.   If your test is negative, no further steps needed. If you do not hear from us or have questions, please call the clinic.      - You  must understand that you have received an Urgent Care treatment only and that you may be released before all of your medical problems are known or treated.   - You, the patient, will arrange for follow up care as instructed with your primary care provider or recommended specialist.   - If your condition worsens or fails to improve we recommend that you receive another evaluation at the ER immediately or contact your PCP to discuss your concerns, or return here.   - Please do not drive or make any important decisions for 24 hours if you have received any pain medications, sedatives or mood altering drugs during your visit.    Disclaimer: This document was drafted with the use of a voice recognition device and is likely to have sound alike errors.

## 2024-12-27 NOTE — TELEPHONE ENCOUNTER
----- Message from Sharee sent at 12/27/2024 12:15 PM CST -----  Contact: patient @ [phone]  .[email or web address]     Patient is calling for Medical Advice regarding:  Symptom: Shoulder Pain - Not From Injury  Outcome: Talk to a nurse or provider within 15 minutes.  Reason: Severe pain now    The caller accepted this outcome.    How long has patient had these symptoms: 12/26/2024    Pharmacy name and phone#:  [id number] - [place] - [address] AT [facility] [address]  [place] [address]  Phone: [phone] Fax: [phone]            Patient wants a call back or thru [facility]:call     Comments:    Please advise patient replies from provider may take up to 48 hours.

## 2024-12-27 NOTE — PATIENT INSTRUCTIONS
R.I.C.E:    Rest, apply ice intermittently, compress with ace wrap, and elevate above heart level as much as possible.  Do not apply ice directly to skin. Wrap ice in cloth before applying.    OTC Tylenol (acetaminophen) up to 4,000 mg a day is safe for short periods and can be used for pain, and fever. However in high doses and prolonged use it can cause liver irritation.    OTC Ibuprofen (NSAID) is a non-steroidal anti-inflammatory that can be used for pain. However it can also cause stomach irritation if over used.    Of note: Aleve, Motrin, Advil, Mobic, meloxicam, and indomethacin are also other names of NSAIDs.    OTC Voltaren topical cream may be applied for relief, as long as you do not have any allergy to the ingredients.    You will need to follow-up with orthopedics if your symptoms persist, as we discussed.      ORTHO AMBULATORY REFERRAL ORDERED:   Someone will call with an appointment this week.  Please answer all unknown calls.  If you are not contacted within 7 days, Please call clinic  for status of appointment. ([phone])          If you have been discharged from the clinic prior to your point of care test results being completed, please make sure to check your [facility] account.  If there is a change in treatment, we will communicate with you through here.  If your test is positive, and medications are ordered, these will be sent to your preferred pharmacy.   If your test is negative, no further steps needed. If you do not hear from us or have questions, please call the clinic.      - You must understand that you have received an Urgent Care treatment only and that you may be released before all of your medical problems are known or treated.   - You, the patient, will arrange for follow up care as instructed with your primary care provider or recommended specialist.   - If your condition worsens or fails to improve we recommend that you receive another evaluation at the ER immediately or  contact your PCP to discuss your concerns, or return here.   - Please do not drive or make any important decisions for 24 hours if you have received any pain medications, sedatives or mood altering drugs during your visit.    Disclaimer: This document was drafted with the use of a voice recognition device and is likely to have sound alike errors.

## 2025-01-03 ENCOUNTER — PATIENT OUTREACH (OUTPATIENT)
Dept: ADMINISTRATIVE | Facility: HOSPITAL | Age: 60
End: 2025-01-03
Payer: COMMERCIAL

## 2025-01-03 NOTE — PROGRESS NOTES
KAREN DM EYE EXAM 11/11/2024 uploaded; faxed to Dr. Barrera Lozano @ [facility] 1x to request. Reminder set.

## 2025-01-26 DIAGNOSIS — Z79.4 UNCONTROLLED TYPE 2 DIABETES MELLITUS WITH HYPERGLYCEMIA, WITH LONG-TERM CURRENT USE OF INSULIN: ICD-10-CM

## 2025-01-26 DIAGNOSIS — E11.65 UNCONTROLLED TYPE 2 DIABETES MELLITUS WITH HYPERGLYCEMIA, WITH LONG-TERM CURRENT USE OF INSULIN: ICD-10-CM

## 2025-01-27 RX ORDER — INSULIN GLARGINE 300 [IU]/ML
30 INJECTION, SOLUTION SUBCUTANEOUS DAILY
Qty: 18 ML | Refills: 1 | Status: SHIPPED | OUTPATIENT
Start: 2025-01-27

## 2025-01-27 RX ORDER — BLOOD-GLUCOSE SENSOR
EACH MISCELLANEOUS
Qty: 9 EACH | Refills: 1 | Status: SHIPPED | OUTPATIENT
Start: 2025-01-27

## 2025-01-28 ENCOUNTER — PATIENT MESSAGE (OUTPATIENT)
Dept: ADMINISTRATIVE | Facility: HOSPITAL | Age: 60
End: 2025-01-28
Payer: COMMERCIAL

## 2025-01-30 ENCOUNTER — TELEPHONE (OUTPATIENT)
Dept: PHARMACY | Facility: CLINIC | Age: 60
End: 2025-01-30
Payer: COMMERCIAL

## 2025-01-31 NOTE — TELEPHONE ENCOUNTER
[facility]/Population Health Chart Review & Patient Outreach Details For Medication Adherence Project    Reason for Outreach Encounter: 3rd Party payor non-compliance report ([employer], BCBS, [employer], etc)  2.  Patient Outreach Method: Reviewed patient chart   3.   Medication in question:    Hyperlipidemia Medications               omega-3 fatty acids/fish oil (FISH OIL-OMEGA-3 FATTY ACIDS) 300-1,000 mg capsule Take 2 g by mouth.    pravastatin (PRAVACHOL) 20 MG tablet Take 1 tablet (20 mg total) by mouth once daily.                  pravastatin  last filled  11/24 for 90 day supply      4.  Reviewed and or Updates Made To: Patient Chart  5. Outreach Outcomes and/or actions taken: Patient filled medication and is on track to be adherent  Additional Notes:

## 2025-02-13 DIAGNOSIS — E78.5 HYPERLIPIDEMIA, UNSPECIFIED HYPERLIPIDEMIA TYPE: ICD-10-CM

## 2025-02-13 DIAGNOSIS — I10 HYPERTENSION, UNSPECIFIED TYPE: ICD-10-CM

## 2025-02-13 DIAGNOSIS — Z79.4 TYPE 2 DIABETES MELLITUS WITH OTHER SPECIFIED COMPLICATION, WITH LONG-TERM CURRENT USE OF INSULIN: ICD-10-CM

## 2025-02-13 DIAGNOSIS — E11.69 TYPE 2 DIABETES MELLITUS WITH OTHER SPECIFIED COMPLICATION, WITH LONG-TERM CURRENT USE OF INSULIN: ICD-10-CM

## 2025-02-13 RX ORDER — METFORMIN HYDROCHLORIDE 500 MG/1
1000 TABLET, EXTENDED RELEASE ORAL 2 TIMES DAILY WITH MEALS
Qty: 360 TABLET | Refills: 0 | Status: SHIPPED | OUTPATIENT
Start: 2025-02-13

## 2025-02-13 RX ORDER — OLMESARTAN MEDOXOMIL AND HYDROCHLOROTHIAZIDE 40/25 40; 25 MG/1; MG/1
1 TABLET ORAL DAILY
Qty: 90 TABLET | Refills: 1 | Status: SHIPPED | OUTPATIENT
Start: 2025-02-13

## 2025-02-13 RX ORDER — PANTOPRAZOLE SODIUM 40 MG/1
40 TABLET, DELAYED RELEASE ORAL DAILY
Qty: 90 TABLET | Refills: 1 | Status: SHIPPED | OUTPATIENT
Start: 2025-02-13

## 2025-02-13 RX ORDER — METOPROLOL SUCCINATE 100 MG/1
100 TABLET, EXTENDED RELEASE ORAL NIGHTLY
Qty: 90 TABLET | Refills: 1 | Status: SHIPPED | OUTPATIENT
Start: 2025-02-13

## 2025-02-13 RX ORDER — PRAVASTATIN SODIUM 20 MG/1
20 TABLET ORAL DAILY
Qty: 90 TABLET | Refills: 1 | Status: SHIPPED | OUTPATIENT
Start: 2025-02-13

## 2025-02-13 NOTE — TELEPHONE ENCOUNTER
Care Due:                  Date            Visit Type   Department     Provider  --------------------------------------------------------------------------------                                EP -                              PRIMARY      [facility] PRIMARY  Last Visit: 07-      CARE ([facility])   ASHLEY Tripp                              EP -                              PRIMARY      [facility] PRIMARY  Next Visit: 03-      CARE ([facility])   ASHLEY Tripp                                                            Last  Test          Frequency    Reason                     Performed    Due Date  --------------------------------------------------------------------------------    HBA1C.......  6 months...  metFORMIN................  10-   04-    [facility] Embedded Care Due Messages. Reference number: [id number].   2/13/2025 1:36:33 PM CST

## 2025-02-14 RX ORDER — INSULIN GLARGINE 300 [IU]/ML
30 INJECTION, SOLUTION SUBCUTANEOUS DAILY
Qty: 9 ML | Refills: 3 | Status: SHIPPED | OUTPATIENT
Start: 2025-02-14

## 2025-02-14 NOTE — TELEPHONE ENCOUNTER
Refill Routing Note   Medication(s) are not appropriate for processing by [facility] for the following reason(s):        No active prescription written by PCP  Other: pharmacy change     [facility] action(s):  Defer  Approve   Requires labs : Yes             Appointments  past 12m or future 3m with PCP    Date Provider   Last Visit   7/8/2024 Ahsan Tripp MD   Next Visit   3/6/2025 Ahsan Tripp MD   ED visits in past 90 days: 0        Note composed:6:41 PM 02/13/2025

## 2025-02-14 NOTE — TELEPHONE ENCOUNTER
Provider Staff:  Action required for this patient    Requires labs - A1C due 4/10/25     Please see care gap opportunities below in Care Due Message.    Thanks!  [facility]     Appointments      Date Provider   Last Visit   7/8/2024 Ahsan Tripp MD   Next Visit   3/6/2025 Ahsan Tripp MD

## 2025-03-03 ENCOUNTER — PATIENT MESSAGE (OUTPATIENT)
Dept: PRIMARY CARE CLINIC | Facility: CLINIC | Age: 60
End: 2025-03-03
Payer: COMMERCIAL

## 2025-03-03 ENCOUNTER — TELEPHONE (OUTPATIENT)
Dept: OBSTETRICS AND GYNECOLOGY | Facility: CLINIC | Age: 60
End: 2025-03-03
Payer: COMMERCIAL

## 2025-03-03 ENCOUNTER — TELEPHONE (OUTPATIENT)
Dept: OBSTETRICS AND GYNECOLOGY | Facility: CLINIC | Age: 60
End: 2025-03-03

## 2025-03-03 NOTE — TELEPHONE ENCOUNTER
Attempted to contact patient to reschedule appointment, no reply, left voice message to return call to clinic.

## 2025-03-06 ENCOUNTER — TELEPHONE (OUTPATIENT)
Dept: PRIMARY CARE CLINIC | Facility: CLINIC | Age: 60
End: 2025-03-06
Payer: COMMERCIAL

## 2025-03-06 ENCOUNTER — PATIENT MESSAGE (OUTPATIENT)
Dept: PRIMARY CARE CLINIC | Facility: CLINIC | Age: 60
End: 2025-03-06
Payer: COMMERCIAL

## 2025-03-06 DIAGNOSIS — Z79.4 TYPE 2 DIABETES MELLITUS WITH OTHER SPECIFIED COMPLICATION, WITH LONG-TERM CURRENT USE OF INSULIN: Primary | ICD-10-CM

## 2025-03-06 DIAGNOSIS — E78.5 HYPERLIPIDEMIA, UNSPECIFIED HYPERLIPIDEMIA TYPE: ICD-10-CM

## 2025-03-06 DIAGNOSIS — E11.69 TYPE 2 DIABETES MELLITUS WITH OTHER SPECIFIED COMPLICATION, WITH LONG-TERM CURRENT USE OF INSULIN: Primary | ICD-10-CM

## 2025-03-06 DIAGNOSIS — I10 HYPERTENSION, UNSPECIFIED TYPE: ICD-10-CM

## 2025-03-06 NOTE — TELEPHONE ENCOUNTER
----- Message from Marcello sent at 3/5/2025  4:54 PM CST -----  Contact: self  .Type: Orders Request What orders/ testing are being requested?Blood work Is there a future appointment scheduled for the patient with PCP? yes When? 5/16/25 Would you prefer a response via FRS? Call back [phone] Comments: the patient needs it done at [facility] in [place]  
Pt notified  
Pt would like labs sent to [facility] before upcoming appt  
yes

## 2025-03-18 ENCOUNTER — OFFICE VISIT (OUTPATIENT)
Dept: OBSTETRICS AND GYNECOLOGY | Facility: CLINIC | Age: 60
End: 2025-03-18
Payer: COMMERCIAL

## 2025-03-18 ENCOUNTER — TELEPHONE (OUTPATIENT)
Dept: OBSTETRICS AND GYNECOLOGY | Facility: CLINIC | Age: 60
End: 2025-03-18
Payer: COMMERCIAL

## 2025-03-18 VITALS
HEIGHT: 64 IN | DIASTOLIC BLOOD PRESSURE: 82 MMHG | WEIGHT: 211.44 LBS | SYSTOLIC BLOOD PRESSURE: 128 MMHG | BODY MASS INDEX: 36.1 KG/M2

## 2025-03-18 DIAGNOSIS — N81.6 CYSTOCELE WITH RECTOCELE: ICD-10-CM

## 2025-03-18 DIAGNOSIS — Z12.11 SCREENING FOR COLON CANCER: ICD-10-CM

## 2025-03-18 DIAGNOSIS — Z12.31 SCREENING MAMMOGRAM, ENCOUNTER FOR: ICD-10-CM

## 2025-03-18 DIAGNOSIS — Z01.419 ENCOUNTER FOR GYNECOLOGICAL EXAMINATION WITHOUT ABNORMAL FINDING: Primary | ICD-10-CM

## 2025-03-18 DIAGNOSIS — N81.10 CYSTOCELE WITH RECTOCELE: ICD-10-CM

## 2025-03-18 PROCEDURE — 99396 PREV VISIT EST AGE 40-64: CPT | Mod: S$GLB,,, | Performed by: NURSE PRACTITIONER

## 2025-03-18 PROCEDURE — 1159F MED LIST DOCD IN RCRD: CPT | Mod: CPTII,S$GLB,, | Performed by: NURSE PRACTITIONER

## 2025-03-18 PROCEDURE — 3079F DIAST BP 80-89 MM HG: CPT | Mod: CPTII,S$GLB,, | Performed by: NURSE PRACTITIONER

## 2025-03-18 PROCEDURE — 1160F RVW MEDS BY RX/DR IN RCRD: CPT | Mod: CPTII,S$GLB,, | Performed by: NURSE PRACTITIONER

## 2025-03-18 PROCEDURE — 3008F BODY MASS INDEX DOCD: CPT | Mod: CPTII,S$GLB,, | Performed by: NURSE PRACTITIONER

## 2025-03-18 PROCEDURE — 3074F SYST BP LT 130 MM HG: CPT | Mod: CPTII,S$GLB,, | Performed by: NURSE PRACTITIONER

## 2025-03-18 PROCEDURE — 99999 PR PBB SHADOW E&M-EST. PATIENT-LVL IV: CPT | Mod: PBBFAC,,, | Performed by: NURSE PRACTITIONER

## 2025-03-18 NOTE — TELEPHONE ENCOUNTER
Called pt regarding appt today confirmed pt identifiers informed of staff meeting at the time of appt offered later today pt accepted, r/s for 2:15 today in [place] with Np

## 2025-03-18 NOTE — PROGRESS NOTES
Subjective:       Patient ID: Jorge Jones is a 60 y.o. female.    Chief Complaint:  Well Woman      History of Present Illness  HPI  Annual Exam-Postmenopausal   presents for annual exam. The patient has no complaints today. The patient is not currently sexually active r/t 's health. GYN screening history: last pap: approximate date 2024 and was normal and last mammogram: approximate date 2024 and was normal. Hx of LEEP .  Normal paps since.  The patient has never taken hormone replacement therapy. Patient denies post-menopausal vaginal bleeding. The patient wears seatbelts: yes. The patient participates in regular exercise: no. Has the patient ever been transfused or tattooed?: yes. Right shoulder tattoo.  The patient reports that there is not domestic violence in her life.    Hot flashes sometimes.  No night sweats.      No bladder/bowel issues.  Colonoscopy 2015 q10y; does not want another one.  Dexa 2-3 years ago; [facility]      retired    GYN & OB History  No LMP recorded. Patient is postmenopausal.   Date of Last Pap: 2024    OB History    Para Term  AB Living   2 2 2   2   SAB IAB Ectopic Multiple Live Births       2      # Outcome Date GA Lbr Isidro/2nd Weight Sex Type Anes PTL Lv   2 Term 84    F Vag-Spont   BRENDA   1 Term 10/27/82    M Vag-Spont   BRENDA       Review of Systems  Review of Systems   Constitutional:  Negative for activity change, appetite change, chills, fatigue and fever.   HENT:  Negative for nasal congestion and mouth sores.    Respiratory:  Negative for cough, shortness of breath and wheezing.    Cardiovascular:  Negative for chest pain.   Gastrointestinal:  Negative for abdominal pain, constipation, diarrhea, nausea and vomiting.   Endocrine: Negative for hair loss and hot flashes.   Genitourinary:  Positive for hot flashes. Negative for bladder incontinence, decreased libido, dysuria, frequency, genital sores, pelvic pain,  urgency, vaginal discharge, vaginal pain, urinary incontinence (occasionally), postcoital bleeding, postmenopausal bleeding, vaginal dryness and vaginal odor.   Musculoskeletal:  Negative for back pain.   Integumentary:  Negative for breast mass, nipple discharge, breast skin changes and breast tenderness.   Neurological:  Negative for headaches.   Hematological:  Negative for adenopathy.   Psychiatric/Behavioral:  Negative for depression and sleep disturbance. The patient is not nervous/anxious.    All other systems reviewed and are negative.  Breast: Negative for breast self exam, lump, mass, mastodynia, nipple discharge, skin changes and tenderness          Objective:      Physical Exam:   Constitutional: She is oriented to person, place, and time. She appears well-developed and well-nourished. No distress.    HENT:   Head: Normocephalic and atraumatic.   Nose: Nose normal.    Eyes: Pupils are equal, round, and reactive to light. Conjunctivae and EOM are normal. Right eye exhibits no discharge. Left eye exhibits no discharge.     Cardiovascular:  Normal rate, regular rhythm and normal heart sounds.      Exam reveals no gallop, no friction rub, no clubbing, no cyanosis and no edema.       No murmur heard.   Pulmonary/Chest: Effort normal and breath sounds normal. No respiratory distress. She has no decreased breath sounds. She has no wheezes. She has no rhonchi. She has no rales. She exhibits no tenderness. Right breast exhibits no inverted nipple, no mass, no nipple discharge, no skin change, no tenderness, no bleeding and no swelling. Left breast exhibits no inverted nipple, no mass, no nipple discharge, no skin change, no tenderness, no bleeding and no swelling. Breasts are symmetrical.        Abdominal: Soft. Bowel sounds are normal. She exhibits no distension. There is no abdominal tenderness. There is no rebound and no guarding. Hernia confirmed negative in the right inguinal area and confirmed negative in  the left inguinal area.     Genitourinary:    Inguinal canal, vagina, uterus, right adnexa and left adnexa normal.   Rectum:      No external hemorrhoid.      Pelvic exam was performed with patient in the lithotomy position.   The external female genitalia was normal.   No external genitalia lesions identified,Genitalia hair distrobution normal .     Labial bartholins normal.There is no rash, tenderness, lesion or injury on the right labia. There is no rash, tenderness, lesion or injury on the left labia. Cervix is normal. Right adnexum displays no mass, no tenderness and no fullness. Left adnexum displays no mass, no tenderness and no fullness. There is rectocele (grade 2) and cystocele (grade 1) in the vagina. No erythema, vaginal discharge, tenderness or bleeding in the vagina.    No foreign body in the vagina.      No signs of injury in the vagina.   Vagina was moist.Vaginal atrophy noted. Cervix exhibits no motion tenderness, no lesion, no discharge, no friability, no tenderness and no polyp.    pap smear not completedUerus contour normal  Uterus is not enlarged and not tender. Uterus size: 8 cm.Normal urethral meatus.Urethral Meatus exhibits: no urethral lesionUrethra findings: no urethral mass, no tenderness, no urethral scarring and no prolapsedBladder findings: no bladder distention and no bladder tenderness          Musculoskeletal: Normal range of motion and moves all extremeties.      Lymphadenopathy: No inguinal adenopathy noted on the right or left side.    Neurological: She is alert and oriented to person, place, and time.    Skin: Skin is warm and dry. No rash noted. She is not diaphoretic. No cyanosis or erythema. No pallor. Nails show no clubbing.    Psychiatric: She has a normal mood and affect. Her speech is normal and behavior is normal. Judgment and thought content normal.             Assessment:        1. Encounter for gynecological examination without abnormal finding    2. Screening  mammogram, encounter for    3. Screening for colon cancer    4. Cystocele with rectocele               Plan:   Practiced kegels; discussed rectocele and cystocele in detail    Ensure adequate calcium and vitamin D intake and daily weight bearing exercises.  Release form signed for dexa from [facility].    PMB discussed; if she experiences any vaginal bleeding she should come back in to be evaluated.    Reviewed updated recommendations for pap smears (every 3 years) in low risk patients.  Recommend annual pelvic exams.  Reviewed recommendations for annual CBE.  Next pap due in 2024.   RTC 1 year or sooner prn.  To PCP for other health maintenance.  mammo ordered, continue yearly until age 73      Encounter for gynecological examination without abnormal finding  -     Mammo Digital Screening Bilat; Future; Expected date: 03/18/2025  -     Cologuard Screening (Multitarget Stool DNA); Future; Expected date: 03/18/2025    Screening mammogram, encounter for  -     Mammo Digital Screening Bilat; Future; Expected date: 03/18/2025    Screening for colon cancer  -     Cologuard Screening (Multitarget Stool DNA); Future; Expected date: 03/18/2025    Cystocele with rectocele

## 2025-03-21 ENCOUNTER — HOSPITAL ENCOUNTER (OUTPATIENT)
Dept: RADIOLOGY | Facility: HOSPITAL | Age: 60
Discharge: HOME | End: 2025-03-21
Attending: NURSE PRACTITIONER
Payer: COMMERCIAL

## 2025-03-21 DIAGNOSIS — Z12.31 SCREENING MAMMOGRAM, ENCOUNTER FOR: ICD-10-CM

## 2025-03-21 DIAGNOSIS — Z01.419 ENCOUNTER FOR GYNECOLOGICAL EXAMINATION WITHOUT ABNORMAL FINDING: ICD-10-CM

## 2025-03-21 PROCEDURE — 77067 SCR MAMMO BI INCL CAD: CPT | Mod: TC,PN

## 2025-03-21 PROCEDURE — 77063 BREAST TOMOSYNTHESIS BI: CPT | Mod: 26,,, | Performed by: RADIOLOGY

## 2025-03-21 PROCEDURE — 77067 SCR MAMMO BI INCL CAD: CPT | Mod: 26,,, | Performed by: RADIOLOGY

## 2025-03-25 ENCOUNTER — RESULTS FOLLOW-UP (OUTPATIENT)
Dept: OBSTETRICS AND GYNECOLOGY | Facility: CLINIC | Age: 60
End: 2025-03-25

## 2025-03-25 ENCOUNTER — TELEPHONE (OUTPATIENT)
Dept: PHARMACY | Facility: CLINIC | Age: 60
End: 2025-03-25
Payer: COMMERCIAL

## 2025-03-25 ENCOUNTER — RESULTS FOLLOW-UP (OUTPATIENT)
Dept: PRIMARY CARE CLINIC | Facility: CLINIC | Age: 60
End: 2025-03-25

## 2025-03-25 ENCOUNTER — OFFICE VISIT (OUTPATIENT)
Dept: DIABETES | Facility: CLINIC | Age: 60
End: 2025-03-25
Payer: COMMERCIAL

## 2025-03-25 VITALS
DIASTOLIC BLOOD PRESSURE: 69 MMHG | WEIGHT: 212.5 LBS | HEART RATE: 67 BPM | SYSTOLIC BLOOD PRESSURE: 117 MMHG | BODY MASS INDEX: 36.48 KG/M2

## 2025-03-25 DIAGNOSIS — E11.65 UNCONTROLLED TYPE 2 DIABETES MELLITUS WITH HYPERGLYCEMIA, WITH LONG-TERM CURRENT USE OF INSULIN: Primary | ICD-10-CM

## 2025-03-25 DIAGNOSIS — I10 DIABETES MELLITUS WITH COINCIDENT HYPERTENSION: ICD-10-CM

## 2025-03-25 DIAGNOSIS — E78.00 HIGH CHOLESTEROL: ICD-10-CM

## 2025-03-25 DIAGNOSIS — Z79.4 UNCONTROLLED TYPE 2 DIABETES MELLITUS WITH HYPERGLYCEMIA, WITH LONG-TERM CURRENT USE OF INSULIN: Primary | ICD-10-CM

## 2025-03-25 DIAGNOSIS — E66.01 SEVERE OBESITY (BMI 35.0-39.9) WITH COMORBIDITY: ICD-10-CM

## 2025-03-25 DIAGNOSIS — E11.9 DIABETES MELLITUS WITH COINCIDENT HYPERTENSION: ICD-10-CM

## 2025-03-25 LAB — GLUCOSE SERPL-MCNC: 126 MG/DL (ref 70–110)

## 2025-03-25 PROCEDURE — 99999 PR PBB SHADOW E&M-EST. PATIENT-LVL IV: CPT | Mod: PBBFAC,,, | Performed by: NURSE PRACTITIONER

## 2025-03-25 NOTE — TELEPHONE ENCOUNTER
[facility]/Population Health Chart Review & Patient Outreach Details For Medication Adherence Project    Reason for Outreach Encounter: 3rd Party payor non-compliance report ([employer], BCBS, C, etc)  2.  Patient Outreach Method: Reviewed Patient Chart  3.   Medication in question: pravastatin   LAST FILLED: 2/13/25 for 90 day supply  Hyperlipidemia Medications              omega-3 fatty acids/fish oil (FISH OIL-OMEGA-3 FATTY ACIDS) 300-1,000 mg capsule Take 2 g by mouth.    pravastatin (PRAVACHOL) 20 MG tablet Take 1 tablet (20 mg total) by mouth once daily.               4.  Reviewed and or Updates Made To: Patient Chart  5. Outreach Outcomes and/or actions taken: Patient filled medication and is on track to be adherent

## 2025-03-25 NOTE — PROGRESS NOTES
Patient ID: Jorge Jones is a 60 y.o. female.  Patient's current PCP is Ahsan Tripp MD.   Collaborating Physician: ZHOU Ruiz MD    Chief Complaint: No chief complaint on file.    HPI  Jorge Jones is a 60 y.o. White female presenting for a new consult with me, previously seen by BETHANY Barnes NP  for diabetes.       Patient has been diagnosed with type 2 diabetes since since 14 years ago .  Complications related to diabetes: cardiovascular disease  Recent diabetes related hospitalizations: none    Previous diabetes education: yes  Occupation: Retired, [employer] owned business, .  here, supportive and also a patient  LMP: ---    Current diet: Watching overall carb content. Lower carb.Weight stable   Current weight: 212#  Weight at FOV: 212#  Activity level: None set - does have stationary bike. Concerned bc glucose Rises with exercise    Changes made at the last visit: Per Melody  Continue Toujeo 22 units once daily.   Continue Novolog 10 units before each main meal. Do not take Novolog if your meal is skipped or is carb free.  Continue Metformin 2 tablets twice a day.     Current issues: Finger numbness - consistent with CTS    Personal history of pancreatitis: denies  Personal history of abdominal surgery: denies  Personal history of thyroid surgery: denies  Family history of pancreatic cancer in first-degree relative: denies  Family history of MTC/MEN/endocrine tumors: denies       Past Medical History:   Diagnosis Date    Arthritis     Coronary artery disease     Diabetes mellitus, type 2     Fibromyalgia     Hyperlipidemia     Hypertension      Social History[1]    Review of patient's allergies indicates:   Allergen Reactions    Cefuroxime Hives     No reaction to Keflex and PCN      Sulfamethoxazole-trimethoprim Hives and Nausea And Vomiting    Cefuroxime axetil Hives and Nausea And Vomiting    Empagliflozin Nausea And Vomiting    Sulfamethoxazole Hives    Trimethoprim Hives  "      CURRENT DM MEDICATIONS:   Diabetes Medications              insulin glargine, TOUJEO, (TOUJEO SOLOSTAR U-300 INSULIN) 300 unit/mL (1.5 mL) InPn pen Inject 28 - 30 Units into the skin once daily at hs    metFORMIN (GLUCOPHAGE-XR) 500 MG ER 24hr tablet Take 2 tablets (1,000 mg total) by mouth 2 (two) times daily with meals.    NOVOLOG FLEXPEN U-100 INSULIN 100 unit/mL (3 mL) InPn pen Inject 12-14 Units into the skin 3 (three) times daily with meals. Plus sliding scale if needed     Past failed treatment(s) include:   Synjardy- side effects;   Jardiance-vomiting;   Trulicity-nausea and vomiting;   Tresiba-edema;     Meter/cgm: Dexcom G7  Blood glucose testing is performed regularly.   Patient is testing continuously times per day.  Any episodes of hypoglycemia? Denies  Glucose trends:   Per CGM download, for the last 14 days:  Average glucose of 144 mg/dL. Patient is 89% in range. 11% of readings are mildly elevated with 0% of readings > 250. 0% hypoglycemia. SD 30 mg/dL. Estimated GMI 6.8%. Glycemic control is stable. Noted rise early morning hours resulting in high fasting glucose      CGM data:      Her blood sugar in the clinic today was:   Lab Results   Component Value Date    POCGLU 115 (A) 12/23/2024       Statin: Taking  ACE/ARB: Taking    Labs reviewed and are noted below.    Screening or Prevention Patient's value Goal Complete/Controlled?   HgA1C Testing and Control   Lab Results   Component Value Date    HGBA1C 6.4 (H) 03/21/2025      Annually/Less than 8% Yes   Lipid profile : 03/21/2025 Annually Yes   LDL control Lab Results   Component Value Date    LDLCALC 99 03/21/2025    Annually/Less than 100 mg/dl  Yes   Nephropathy screening Lab Results   Component Value Date    MICALBCREAT 27 08/11/2023     No results found for: "PROTEINUA" Annually Yes   Blood pressure BP Readings from Last 1 Encounters:   03/18/25 128/82    Less than 140/90 Yes   Dilated retinal exam : 01/29/2025 Annually Yes    Foot " "exam   : 12/23/2024 Annually Yes     Glucose   Date Value Ref Range Status   03/21/2025 118 (H) 70 - 99 mg/dL Final     Lab Results   Component Value Date    TSH 2.340 03/21/2025     No results found for: "CPEPTIDE"  No results found for: "GLUTAMICACID"    Wt Readings from Last 3 Encounters:   03/25/25 1414 96.4 kg (212 lb 8.4 oz)   03/18/25 1413 95.9 kg (211 lb 6.7 oz)   12/27/24 1407 96.8 kg (213 lb 6.5 oz)       Review of Systems   Constitutional:  Negative for malaise/fatigue and weight loss.   Eyes:  Negative for blurred vision and double vision.   Respiratory:  Negative for shortness of breath.    Cardiovascular: Negative.    Gastrointestinal: Negative.    Genitourinary:  Negative for frequency.   Musculoskeletal:  Negative for myalgias.   Neurological:  Positive for tingling (fingers with L>R).   Psychiatric/Behavioral: Negative.         Physical Exam  Vitals reviewed.   Constitutional:       Appearance: Normal appearance. She is obese.   Eyes:      Conjunctiva/sclera: Conjunctivae normal.      Pupils: Pupils are equal, round, and reactive to light.   Neck:      Thyroid: No thyroid mass, thyromegaly or thyroid tenderness.      Vascular: No carotid bruit.   Cardiovascular:      Rate and Rhythm: Normal rate and regular rhythm.      Pulses: Normal pulses.      Heart sounds: Normal heart sounds.   Pulmonary:      Effort: Pulmonary effort is normal.      Breath sounds: Normal breath sounds.   Abdominal:      General: Bowel sounds are normal.      Palpations: Abdomen is soft.   Musculoskeletal:      Cervical back: Normal range of motion and neck supple.      Right lower leg: No edema.      Left lower leg: No edema.   Skin:     General: Skin is warm and dry.      Comments: Sites without hypertrophy and/or infection   Neurological:      General: No focal deficit present.      Mental Status: She is alert and oriented to person, place, and time.   Psychiatric:         Mood and Affect: Mood normal.         Behavior: " Behavior normal.           Assessment & Plan    Jorge was seen today for diabetes mellitus.    Diagnoses and all orders for this visit:    Uncontrolled type 2 diabetes mellitus with hyperglycemia, with long-term current use of insulin - at goal with noted Natasha phenomenon  -     POCT Glucose, Hand-Held Device  -     Stabilize Toujeo dose at 30 units nightly  -     Will watch overnight pattern on set dose Toujeo. If FBS remains > 140, increase toujeo by 2 units  -     Encouraged daily exercise    High cholesterol- on statin and fish oil    Diabetes mellitus with coincident hypertension - at goal on ARB    Severe obesity (BMI 35.0-39.9) with comorbidity  - diet and exercise    - Reviewed with patient and :  The basic pathophysiology of Type 2 diabetes  Mechanism of action and action time of medications  Use of home glucose monitor/cgm  Basic diet/carbohydrate counting/avoiding simple sugars/plate method  Proper hydration   Risk of complications and preventive measures  When to call for assistance          - Follow up: 3 months with [facility] per patient request    Visit today included increased complexity associated with the care of the episodic problem GLUCOSE CONTROL addressed and managing the longitudinal care of the patient due to the serious and/or complex managed problem(s) DIABETES.                   [1]   Social History  Socioeconomic History    Marital status:    Tobacco Use    Smoking status: Never     Passive exposure: Past    Smokeless tobacco: Never   Substance and Sexual Activity    Alcohol use: Never    Drug use: Never    Sexual activity: Not Currently     Partners: Male     Birth control/protection: See Surgical Hx

## 2025-03-25 NOTE — PROGRESS NOTES
Labs look good!     Blood counts look fine.  Electrolytes, kidney function, liver function, thyroid function all look normal.    What I love is your blood sugar!  Random glucose down to 118.  A1c down to 6.4%.  These are excellent!  Keep your appointment with the diabetes clinic today.  They will go over the results with you in more detail.

## 2025-03-26 ENCOUNTER — PATIENT OUTREACH (OUTPATIENT)
Dept: ADMINISTRATIVE | Facility: HOSPITAL | Age: 60
End: 2025-03-26
Payer: COMMERCIAL

## 2025-04-04 ENCOUNTER — OFFICE VISIT (OUTPATIENT)
Dept: INTERNAL MEDICINE | Facility: CLINIC | Age: 60
End: 2025-04-04
Payer: COMMERCIAL

## 2025-04-04 VITALS
TEMPERATURE: 98 F | OXYGEN SATURATION: 96 % | WEIGHT: 213.38 LBS | DIASTOLIC BLOOD PRESSURE: 70 MMHG | BODY MASS INDEX: 36.63 KG/M2 | HEART RATE: 71 BPM | SYSTOLIC BLOOD PRESSURE: 120 MMHG

## 2025-04-04 DIAGNOSIS — E11.69 TYPE 2 DIABETES MELLITUS WITH OTHER SPECIFIED COMPLICATION, WITH LONG-TERM CURRENT USE OF INSULIN: ICD-10-CM

## 2025-04-04 DIAGNOSIS — T25.221A PARTIAL THICKNESS BURN OF RIGHT FOOT, INITIAL ENCOUNTER: Primary | ICD-10-CM

## 2025-04-04 DIAGNOSIS — Z79.4 TYPE 2 DIABETES MELLITUS WITH OTHER SPECIFIED COMPLICATION, WITH LONG-TERM CURRENT USE OF INSULIN: ICD-10-CM

## 2025-04-04 PROCEDURE — 99999 PR PBB SHADOW E&M-EST. PATIENT-LVL IV: CPT | Mod: PBBFAC,,, | Performed by: NURSE PRACTITIONER

## 2025-04-04 RX ORDER — MUPIROCIN 20 MG/G
OINTMENT TOPICAL 3 TIMES DAILY
Qty: 30 G | Refills: 1 | Status: SHIPPED | OUTPATIENT
Start: 2025-04-04

## 2025-04-04 RX ORDER — DOXYCYCLINE 100 MG/1
100 CAPSULE ORAL EVERY 12 HOURS
Qty: 14 CAPSULE | Refills: 0 | Status: SHIPPED | OUTPATIENT
Start: 2025-04-04 | End: 2025-04-11
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
## 2025-04-10 ENCOUNTER — PATIENT OUTREACH (OUTPATIENT)
Dept: ADMINISTRATIVE | Facility: HOSPITAL | Age: 60
End: 2025-04-10
Payer: COMMERCIAL

## 2025-04-10 DIAGNOSIS — I10 HYPERTENSION, UNSPECIFIED TYPE: ICD-10-CM

## 2025-04-10 DIAGNOSIS — M79.7 FIBROMYALGIA: ICD-10-CM

## 2025-04-10 RX ORDER — DULOXETIN HYDROCHLORIDE 30 MG/1
CAPSULE, DELAYED RELEASE ORAL
Refills: 0 | OUTPATIENT
Start: 2025-04-10

## 2025-04-10 RX ORDER — AMLODIPINE BESYLATE 5 MG/1
TABLET ORAL
Refills: 0 | OUTPATIENT
Start: 2025-04-10

## 2025-04-11 DIAGNOSIS — E11.65 UNCONTROLLED TYPE 2 DIABETES MELLITUS WITH HYPERGLYCEMIA, WITH LONG-TERM CURRENT USE OF INSULIN: ICD-10-CM

## 2025-04-11 DIAGNOSIS — Z79.4 UNCONTROLLED TYPE 2 DIABETES MELLITUS WITH HYPERGLYCEMIA, WITH LONG-TERM CURRENT USE OF INSULIN: ICD-10-CM

## 2025-04-11 RX ORDER — INSULIN ASPART 100 [IU]/ML
10 INJECTION, SOLUTION INTRAVENOUS; SUBCUTANEOUS
Qty: 15 ML | Refills: 3 | Status: SHIPPED | OUTPATIENT
Start: 2025-04-11

## 2025-04-11 NOTE — TELEPHONE ENCOUNTER
No care due was identified.  [facility] Embedded Care Due Messages. Reference number: [id number].   4/10/2025 7:22:30 PM CDT

## 2025-04-11 NOTE — TELEPHONE ENCOUNTER
[facility] Note  Quick DC. Inappropriate Request   Refill request requires further review by MD: NO   Medication Therapy Plan: Pharmacy is requesting new script(s) for the following medications without required information, (sig/ frequency/qty/etc)     [facility] action(s):  Quick Discontinue      Duplicate Pended Encounter(s)/ Last Prescribed Details:    Pharmacies have been requesting medications for patients without required information, (sig, frequency, qty, etc.). In addition, requests are sent for medication(s) pt. are currently not taking, and medications patients have never taken.    We have spoken to the pharmacies about these request types and advised their teams previously that we are unable to assess these New Script requests and require all details for these requests. This is a known issue and has been reported.        Medication related problems are not assessed for [facility].   Medication Reconciliation Completed? NO Were there pending details that required adjustment? NO     Automatic Epic Generated Protocol Data Below:   Requested Prescriptions     Pending Prescriptions Disp Refills    amLODIPine (NORVASC) 5 MG tablet [Pharmacy Med Name: AMLODIPINE BESYLATE TABS 5MG]  0    DULoxetine (CYMBALTA) 30 MG capsule [Pharmacy Med Name: DULOXETINE HCL DR CAPS 30MG]  0              Appointments      Date Provider   Last Visit   7/8/2024 Ahsan Tripp MD   Next Visit   5/16/2025 Ahsan Tripp MD        Note composed:7:24 PM 04/10/2025

## 2025-05-06 ENCOUNTER — PATIENT MESSAGE (OUTPATIENT)
Dept: DIABETES | Facility: CLINIC | Age: 60
End: 2025-05-06
Payer: COMMERCIAL

## 2025-05-06 DIAGNOSIS — Z79.4 TYPE 2 DIABETES MELLITUS WITH OTHER SPECIFIED COMPLICATION, WITH LONG-TERM CURRENT USE OF INSULIN: ICD-10-CM

## 2025-05-06 DIAGNOSIS — E11.69 TYPE 2 DIABETES MELLITUS WITH OTHER SPECIFIED COMPLICATION, WITH LONG-TERM CURRENT USE OF INSULIN: ICD-10-CM

## 2025-05-06 RX ORDER — METFORMIN HYDROCHLORIDE 500 MG/1
1000 TABLET, EXTENDED RELEASE ORAL 2 TIMES DAILY WITH MEALS
Qty: 360 TABLET | Refills: 0 | OUTPATIENT
Start: 2025-05-06

## 2025-05-06 RX ORDER — METFORMIN HYDROCHLORIDE 500 MG/1
1000 TABLET, EXTENDED RELEASE ORAL 2 TIMES DAILY WITH MEALS
Qty: 360 TABLET | Refills: 0 | Status: SHIPPED | OUTPATIENT
Start: 2025-05-06

## 2025-05-06 NOTE — TELEPHONE ENCOUNTER
Refill Decision Note   Jorge Robert  is requesting a refill authorization.  Brief Assessment and Rationale for Refill:  Approve     Medication Therapy Plan:        Comments:     Note composed:2:53 PM 05/06/2025

## 2025-05-06 NOTE — TELEPHONE ENCOUNTER
No care due was identified.  [facility] Embedded Care Due Messages. Reference number: [id number].   5/06/2025 1:36:43 PM CDT

## 2025-05-16 ENCOUNTER — OFFICE VISIT (OUTPATIENT)
Dept: PRIMARY CARE CLINIC | Facility: CLINIC | Age: 60
End: 2025-05-16
Payer: COMMERCIAL

## 2025-05-16 VITALS
SYSTOLIC BLOOD PRESSURE: 110 MMHG | BODY MASS INDEX: 35.71 KG/M2 | DIASTOLIC BLOOD PRESSURE: 74 MMHG | WEIGHT: 209.19 LBS | HEART RATE: 70 BPM | HEIGHT: 64 IN

## 2025-05-16 DIAGNOSIS — G89.29 BILATERAL CHRONIC KNEE PAIN: ICD-10-CM

## 2025-05-16 DIAGNOSIS — E78.5 HYPERLIPIDEMIA, UNSPECIFIED HYPERLIPIDEMIA TYPE: ICD-10-CM

## 2025-05-16 DIAGNOSIS — G47.33 OSA ON CPAP: ICD-10-CM

## 2025-05-16 DIAGNOSIS — M79.7 FIBROMYALGIA: ICD-10-CM

## 2025-05-16 DIAGNOSIS — M25.561 BILATERAL CHRONIC KNEE PAIN: ICD-10-CM

## 2025-05-16 DIAGNOSIS — I10 HYPERTENSION, UNSPECIFIED TYPE: ICD-10-CM

## 2025-05-16 DIAGNOSIS — Z79.4 TYPE 2 DIABETES MELLITUS WITH OTHER SPECIFIED COMPLICATION, WITH LONG-TERM CURRENT USE OF INSULIN: ICD-10-CM

## 2025-05-16 DIAGNOSIS — E11.69 TYPE 2 DIABETES MELLITUS WITH OTHER SPECIFIED COMPLICATION, WITH LONG-TERM CURRENT USE OF INSULIN: ICD-10-CM

## 2025-05-16 DIAGNOSIS — Z95.1 S/P CABG X 3: ICD-10-CM

## 2025-05-16 DIAGNOSIS — M25.562 BILATERAL CHRONIC KNEE PAIN: ICD-10-CM

## 2025-05-16 DIAGNOSIS — Z00.00 ANNUAL PHYSICAL EXAM: Primary | ICD-10-CM

## 2025-05-16 DIAGNOSIS — I25.810 CORONARY ARTERY DISEASE INVOLVING CORONARY BYPASS GRAFT OF NATIVE HEART WITHOUT ANGINA PECTORIS: ICD-10-CM

## 2025-05-16 PROCEDURE — 99999 PR PBB SHADOW E&M-EST. PATIENT-LVL IV: CPT | Mod: PBBFAC,,, | Performed by: FAMILY MEDICINE

## 2025-05-16 RX ORDER — DULOXETIN HYDROCHLORIDE 30 MG/1
30 CAPSULE, DELAYED RELEASE ORAL DAILY
Qty: 90 CAPSULE | Refills: 3 | Status: SHIPPED | OUTPATIENT
Start: 2025-05-16

## 2025-05-16 RX ORDER — METOPROLOL SUCCINATE 100 MG/1
100 TABLET, EXTENDED RELEASE ORAL NIGHTLY
Qty: 90 TABLET | Refills: 3 | Status: SHIPPED | OUTPATIENT
Start: 2025-05-16

## 2025-05-16 RX ORDER — PRAVASTATIN SODIUM 20 MG/1
20 TABLET ORAL DAILY
Qty: 90 TABLET | Refills: 3 | Status: SHIPPED | OUTPATIENT
Start: 2025-05-16

## 2025-05-16 RX ORDER — AMLODIPINE BESYLATE 5 MG/1
5 TABLET ORAL NIGHTLY
Qty: 90 TABLET | Refills: 3 | Status: SHIPPED | OUTPATIENT
Start: 2025-05-16

## 2025-05-16 RX ORDER — PANTOPRAZOLE SODIUM 40 MG/1
40 TABLET, DELAYED RELEASE ORAL DAILY
Qty: 90 TABLET | Refills: 1 | Status: SHIPPED | OUTPATIENT
Start: 2025-05-16

## 2025-05-16 RX ORDER — OLMESARTAN MEDOXOMIL AND HYDROCHLOROTHIAZIDE 40/25 40; 25 MG/1; MG/1
1 TABLET ORAL DAILY
Qty: 90 TABLET | Refills: 3 | Status: SHIPPED | OUTPATIENT
Start: 2025-05-16

## 2025-05-16 NOTE — PROGRESS NOTES
"    [facility]       [address] [place] [address]      Phone: [phone]      Fax: [phone]    Ahsan Tripp MD                Office Visit  05/16/2025        Subjective      HPI:  Jorge Jones is a 60 y.o. female presents today in clinic for "Follow-up  ."     60-year-old female presents today for annual wellness exam.    Her , Mr. Milligan, present.  He provides portions of the history.    Overall, feels okay today.  No chest pains, shortness O breath.  No fever, chills, body aches.  No coughing, sneezing, URI type symptoms.  Appetite normal.  Bowel movements normal.  No urinary issues.      Has diabetes.  Follows with [facility].  Takes Toujeo 30 units daily.  NovoLog 10 units with meals, as needed.  Metformin 2000 mg daily.  Blood sugar has come down from 8.2% to 6.4%.  Feeling good.  Has been on Ozempic, Synjardy in the past.  Was very expensive.    Has hypertension.  Also has CAD/CABG.  HLD.  Currently takes amlodipine 5 mg daily, along with Benicar HCT 40-25 mg daily.  Takes pravastatin 20 mg for cholesterol.  Cardiologist also has her on Toprol- mg daily and baby aspirin.    Has chronic issues with her knees.  Was told that at some point she may need knee replacement.  Has had steroid shots, rooster comb.  Has most trouble going up and downstairs.  Going down is worse.  Has to go sideways, one step at a time.  Saw orthopedist years ago.  Not quite ready to do anything yet.    She also has LAURIE.  Uses CPAP.  Has been using it for 10+ years.  Has been a couple of years since she has gotten new supplies.  Previous sleep specialist retired.  The sleep clinic where she used to get her supplies close down.  Was told she needed a new prescription to get refills.    PMH:  Dm, HTN, CAD, HLD, LAURIE, fibromyalgia  PSH:  Tonsils, BTL  FMH:  Dm, HLD, HTN, CAD/mi, AFib  Allergies:  Ceftin causes hives.  Bactrim/sulfa also causes hives.  Social:  " Stays at home.  .  Denies T/a/D    Exercise:  No regular exercise program.  Has a stationary bike, but is limited due to knee pains.    Ortho:  Dr. Hood [facility]  Eyes:  Kat  Cardiology:  Dr. Vipin Mercedes  Gyn:  Carine (Prev Dr. Jami Brewer at [facility] - retired)    Colon:  Cologuard 3/27/25.  Repeat three years ().    Pap:  2024  MM2025    **Labs at [facility]**        The following were updated and reviewed by myself in the chart: medications, past medical history, past surgical history, family history, social history, and allergies.     Medications:  Medications Ordered Prior to Encounter[1]     PMHx:  Past Medical History:   Diagnosis Date    Arthritis     Coronary artery disease     Diabetes mellitus, type 2     Fibromyalgia     Hyperlipidemia     Hypertension       Patient Active Problem List    Diagnosis Date Noted    Cystocele with rectocele 2025    Severe obesity (BMI 35.0-39.9) with comorbidity 10/15/2024    Anxiety disorder 2024    Arteriosclerosis of coronary artery 2024    Depressive disorder 2024    High cholesterol 2024    Heart disease 2024    Uses continuous positive airway pressure (CPAP) ventilation at home 2024    Shortness of breath 2023    BMI 38.0-38.9,adult 2021    Diabetes mellitus with coincident hypertension 2017    Fibromyalgia 02/15/2017    Adrenal adenoma 2016    LAURIE on CPAP 2015    Allergic rhinitis 2014    Vitamin D deficiency disease 2014        PSHx:  Past Surgical History:   Procedure Laterality Date    CORONARY ARTERY BYPASS GRAFT      TONSILLECTOMY      TUBAL LIGATION          FHx:  Family History   Problem Relation Name Age of Onset    Diabetes Father      Hyperlipidemia Father      Diabetes Mother      Hyperlipidemia Mother      Heart attack Mother      Breast cancer Paternal Aunt      Breast cancer Paternal Cousin      Colon cancer Paternal Cousin       "Ovarian cancer Neg Hx      Uterine cancer Neg Hx      Cervical cancer Neg Hx          Social:  Social History     Socioeconomic History    Marital status:    Tobacco Use    Smoking status: Never     Passive exposure: Past    Smokeless tobacco: Never   Substance and Sexual Activity    Alcohol use: Never    Drug use: Never    Sexual activity: Not Currently     Partners: Male     Birth control/protection: See Surgical Hx        Allergies:  Review of patient's allergies indicates:   Allergen Reactions    Cefuroxime Hives     No reaction to Keflex and PCN      Sulfamethoxazole-trimethoprim Hives and Nausea And Vomiting    Cefuroxime axetil Hives and Nausea And Vomiting    Empagliflozin Nausea And Vomiting    Sulfamethoxazole Hives    Trimethoprim Hives        ROS:  Review of Systems   Constitutional:  Negative for activity change, appetite change, chills and fever.   HENT:  Negative for congestion, postnasal drip, rhinorrhea, sore throat and trouble swallowing.    Respiratory:  Negative for cough, shortness of breath and wheezing.    Cardiovascular:  Negative for chest pain and palpitations.   Gastrointestinal:  Negative for abdominal pain, constipation, diarrhea, nausea and vomiting.   Genitourinary:  Negative for difficulty urinating.   Musculoskeletal:  Positive for arthralgias and gait problem. Negative for myalgias.   Skin:  Negative for color change and rash.   Neurological:  Negative for speech difficulty and headaches.   All other systems reviewed and are negative.         Objective      /74   Pulse 70   Ht 5' 4" (1.626 m)   Wt 94.9 kg (209 lb 3.5 oz)   BMI 35.91 kg/m²   Ht Readings from Last 3 Encounters:   05/16/25 5' 4" (1.626 m)   03/18/25 5' 4" (1.626 m)   12/27/24 5' 4" (1.626 m)     Wt Readings from Last 3 Encounters:   05/16/25 94.9 kg (209 lb 3.5 oz)   04/04/25 96.8 kg (213 lb 6.5 oz)   03/25/25 96.4 kg (212 lb 8.4 oz)       PHYSICAL EXAM:  Physical Exam  Vitals and nursing note " reviewed.   Constitutional:       General: She is not in acute distress.     Appearance: Normal appearance.   HENT:      Head: Normocephalic and atraumatic.      Right Ear: Tympanic membrane, ear canal and external ear normal.      Left Ear: Tympanic membrane, ear canal and external ear normal.      Nose: Nose normal. No congestion or rhinorrhea.      Mouth/Throat:      Mouth: Mucous membranes are moist.      Pharynx: Oropharynx is clear. No oropharyngeal exudate or posterior oropharyngeal erythema.   Eyes:      Extraocular Movements: Extraocular movements intact.      Conjunctiva/sclera: Conjunctivae normal.      Pupils: Pupils are equal, round, and reactive to light.   Cardiovascular:      Rate and Rhythm: Normal rate and regular rhythm.   Pulmonary:      Effort: Pulmonary effort is normal. No respiratory distress.      Breath sounds: No wheezing, rhonchi or rales.   Musculoskeletal:         General: Normal range of motion.      Cervical back: Normal range of motion.   Lymphadenopathy:      Cervical: No cervical adenopathy.   Skin:     General: Skin is warm and dry.      Findings: No rash.   Neurological:      Mental Status: She is alert.              LABS / IMAGING:  Recent Results (from the past 26 weeks)   POCT Glucose, Hand-Held Device    Collection Time: 12/23/24  2:07 PM   Result Value Ref Range    POC Glucose 115 (A) 70 - 110 MG/DL    DIABETES EYE EXAM    Collection Time: 01/29/25 12:55 PM   Result Value Ref Range    Left Eye DM Retinopathy Negative     Right Eye DM Retinopathy Negative    T4, FREE    Collection Time: 03/21/25  6:09 AM   Result Value Ref Range    T4, Free 0.96 0.82 - 1.77 ng/dL   Lipid Panel    Collection Time: 03/21/25  6:09 AM   Result Value Ref Range    Cholesterol 152 100 - 199 mg/dL    Triglycerides 77 0 - 149 mg/dL    HDL 38 (L) >39 mg/dL    VLDL Cholesterol Sudeep 15 5 - 40 mg/dL    LDL Calculated 99 0 - 99 mg/dL   TSH    Collection Time: 03/21/25  6:09 AM   Result Value Ref Range     TSH 2.340 0.450 - 4.500 uIU/mL   Comprehensive Metabolic Panel    Collection Time: 03/21/25  6:09 AM   Result Value Ref Range    Glucose 118 (H) 70 - 99 mg/dL    BUN 17 8 - 27 mg/dL    Creatinine 0.73 0.57 - 1.00 mg/dL    eGFR 93 >59 mL/min/1.73    BUN/Creatinine Ratio 23 12 - 28    Sodium 143 134 - 144 mmol/L    Potassium 4.5 3.5 - 5.2 mmol/L    Chloride 104 96 - 106 mmol/L    CO2 24 20 - 29 mmol/L    Calcium 9.6 8.7 - 10.3 mg/dL    Protein, Total 6.9 6.0 - 8.5 g/dL    Albumin 4.3 3.9 - 4.9 g/dL    Globulin, Total 2.6 1.5 - 4.5 g/dL    Total Bilirubin <0.2 0.0 - 1.2 mg/dL    Alkaline Phosphatase 77 44 - 121 IU/L    AST 13 0 - 40 IU/L    ALT 8 0 - 32 IU/L   CBC Auto Differential    Collection Time: 03/21/25  6:09 AM   Result Value Ref Range    WBC 9.1 3.4 - 10.8 x10E3/uL    RBC 4.94 3.77 - 5.28 x10E6/uL    Hemoglobin 12.6 11.1 - 15.9 g/dL    Hematocrit 39.6 34.0 - 46.6 %    MCV 80 79 - 97 fL    MCH 25.5 (L) 26.6 - 33.0 pg    MCHC 31.8 31.5 - 35.7 g/dL    RDW 13.7 11.7 - 15.4 %    Platelets 348 150 - 450 x10E3/uL    Neutrophils 49 Not Estab. %    Lymphs 40 Not Estab. %    Monocytes 8 Not Estab. %    Eos 2 Not Estab. %    Basos 1 Not Estab. %    Neutrophils (Absolute) 4.5 1.4 - 7.0 x10E3/uL    Lymphs (Absolute) 3.6 (H) 0.7 - 3.1 x10E3/uL    Monocytes(Absolute) 0.7 0.1 - 0.9 x10E3/uL    Eos (Absolute) 0.2 0.0 - 0.4 x10E3/uL    Baso (Absolute) 0.1 0.0 - 0.2 x10E3/uL    Immature Granulocytes 0 Not Estab. %    Immature Grans (Abs) 0.0 0.0 - 0.1 x10E3/uL   Hemoglobin A1C    Collection Time: 03/21/25  6:09 AM   Result Value Ref Range    Hemoglobin A1c 6.4 (H) 4.8 - 5.6 %   POCT Glucose, Hand-Held Device    Collection Time: 03/25/25  2:27 PM   Result Value Ref Range    POC Glucose 126 (A) 70 - 110 MG/DL   Cologuard Screening (Multitarget Stool DNA)    Collection Time: 03/27/25  7:50 PM    Specimen: Rectum; Stool   Result Value Ref Range    Cologuard Result Negative Negative         Assessment    1. Annual physical exam    2.  Type 2 diabetes mellitus with other specified complication, with long-term current use of insulin    3. Hypertension, unspecified type    4. Hyperlipidemia, unspecified hyperlipidemia type    5. Coronary artery disease involving coronary bypass graft of native heart without angina pectoris    6. S/P CABG x 3    7. Bilateral chronic knee pain    8. Fibromyalgia    9. LAURIE on CPAP          Plan    Jorge was seen today for follow-up.    Diagnoses and all orders for this visit:    Annual physical exam    Type 2 diabetes mellitus with other specified complication, with long-term current use of insulin    Hypertension, unspecified type  -     amLODIPine (NORVASC) 5 MG tablet; Take 1 tablet (5 mg total) by mouth every evening.  -     metoprolol succinate (TOPROL-XL) 100 MG 24 hr tablet; Take 1 tablet (100 mg total) by mouth every evening.  -     olmesartan-hydrochlorothiazide (BENICAR HCT) 40-25 mg per tablet; Take 1 tablet by mouth once daily.    Hyperlipidemia, unspecified hyperlipidemia type  -     pravastatin (PRAVACHOL) 20 MG tablet; Take 1 tablet (20 mg total) by mouth once daily.    Coronary artery disease involving coronary bypass graft of native heart without angina pectoris    S/P CABG x 3    Bilateral chronic knee pain    Fibromyalgia  -     DULoxetine (CYMBALTA) 30 MG capsule; Take 1 capsule (30 mg total) by mouth once daily.    LAURIE on CPAP  -     Ambulatory referral/consult to Pulmonology; Future    Other orders  -     pantoprazole (PROTONIX) 40 MG tablet; Take 1 tablet (40 mg total) by mouth once daily.      Physically, everything looks really good today.      Reviewed labs that were done at [facility].  Everything looks great.  We can be next year.      Continue to follow with Diabetes Clinic.      Med refills, as above.    Referral to pulmonology to discuss CPAP, equipment.  Has been many years since she has gotten a machine.  Not sure if they need to do another sleep study.    She will let us know if/when  the knees get to the point where she is ready do something about it.  At that time, we can get her in with sports Medicine/ortho.  She saw Dr. Zuniga for her shoulder in the past.    FOLLOW-UP:  Follow up in about 6 months (around 11/16/2025) for check up.    I spent a total of 40 minutes face to face and non-face to face on the date of this visit.This includes time preparing to see the patient (eg, review of tests, notes), obtaining and/or reviewing additional history from an independent historian and/or outside medical records, documenting clinical information in the electronic health record, independently interpreting results and/or communicating results to the patient/family/caregiver, or care coordinator.  Visit today included increased complexity associated with the care of the episodic problem addressed and managing the longitudinal care of the patient due to the serious and/or complex managed problem(s).    Signed by:  Ahsan Tripp MD       [1]   Current Outpatient Medications on File Prior to Visit   Medication Sig Dispense Refill    aspirin (ECOTRIN) 81 MG EC tablet Take 81 mg by mouth.      blood-glucose sensor (DEXCOM G7 SENSOR) Isha Change sensor every 10 days 9 each 1    insulin glargine, TOUJEO, (TOUJEO SOLOSTAR U-300 INSULIN) 300 unit/mL (1.5 mL) InPn pen Inject 30 Units into the skin once daily. 9 mL 3    metFORMIN (GLUCOPHAGE-XR) 500 MG ER 24hr tablet TAKE 2 TABLETS TWICE A DAY WITH MEALS 360 tablet 0    mupirocin (BACTROBAN) 2 % ointment Apply topically 3 (three) times daily. 30 g 1    NOVOLOG FLEXPEN U-100 INSULIN 100 unit/mL (3 mL) InPn pen Inject 10 Units into the skin 3 (three) times daily with meals. Plus sliding scale if needed 15 mL 3    omega-3 fatty acids/fish oil (FISH OIL-OMEGA-3 FATTY ACIDS) 300-1,000 mg capsule Take 2 g by mouth.      vitamin D (VITAMIN D3) 1000 units Tab Take 1,000 Units by mouth once daily.      [DISCONTINUED] amLODIPine (NORVASC) 5 MG tablet Take 1 tablet (5  mg total) by mouth every evening. 90 tablet 3    [DISCONTINUED] DULoxetine (CYMBALTA) 30 MG capsule Take 1 capsule (30 mg total) by mouth once daily. 90 capsule 3    [DISCONTINUED] metoprolol succinate (TOPROL-XL) 100 MG 24 hr tablet Take 1 tablet (100 mg total) by mouth every evening. 90 tablet 1    [DISCONTINUED] olmesartan-hydrochlorothiazide (BENICAR HCT) 40-25 mg per tablet Take 1 tablet by mouth once daily. 90 tablet 1    [DISCONTINUED] pantoprazole (PROTONIX) 40 MG tablet Take 1 tablet (40 mg total) by mouth once daily. 90 tablet 1    [DISCONTINUED] pravastatin (PRAVACHOL) 20 MG tablet Take 1 tablet (20 mg total) by mouth once daily. 90 tablet 1     No current facility-administered medications on file prior to visit.

## 2025-05-16 NOTE — PATIENT INSTRUCTIONS
Physically, everything looks really good today.      All of your recent blood work looks great!  Keep up the good work.      Refills of your medicines have been sent to the pharmacy.  Please continue taking them, as directed.      Continue to follow with the Diabetes [facility].    I am placing a referral to Ms. Yo, our pulmonology/sleep specialist.  She sees patients at [facility].  She should be able to get you a new CPAP and supplies.    If the knees get worse, just let me know and we can get you in with our sports medicine/orthopedic specialists.  We have someone coming here on Mondays and Thursdays.    Continue to eat a healthy diet.  Be careful with portion sizes.  Includes lots of fresh fruits, vegetables, whole grains, lean proteins.  See info below.    Keep hydrated.  Be sure to drink at least 8-10, 8 oz, glasses of water every day.    Stay active.  Try to do some sort of physical activity every day.  Nothing outrageous, just try walking for 10-15 minutes each day.

## 2025-05-20 ENCOUNTER — TELEPHONE (OUTPATIENT)
Dept: PHARMACY | Facility: CLINIC | Age: 60
End: 2025-05-20
Payer: COMMERCIAL

## 2025-05-21 NOTE — TELEPHONE ENCOUNTER
[facility] Chart Review & Patient Outreach Details For Medication Adherence Project    Reason for Outreach Encounter: 3rd Party payor non-compliance report ([employer], [employer], C, etc)  2.  Patient Outreach Method: Reviewed Patient Chart  3.   Medication in question: pravastatin   LAST FILLED: 4/22/25 for 90 day supply  Hyperlipidemia Medications              omega-3 fatty acids/fish oil (FISH OIL-OMEGA-3 FATTY ACIDS) 300-1,000 mg capsule Take 2 g by mouth.    pravastatin (PRAVACHOL) 20 MG tablet Take 1 tablet (20 mg total) by mouth once daily.               4.  Reviewed and or Updates Made To: Patient Chart  5. Outreach Outcomes and/or actions taken: Patient filled medication and is on track to be adherent

## 2025-05-29 ENCOUNTER — OFFICE VISIT (OUTPATIENT)
Dept: PULMONOLOGY | Facility: CLINIC | Age: 60
End: 2025-05-29
Payer: COMMERCIAL

## 2025-05-29 ENCOUNTER — TELEPHONE (OUTPATIENT)
Dept: PULMONOLOGY | Facility: CLINIC | Age: 60
End: 2025-05-29
Payer: COMMERCIAL

## 2025-05-29 VITALS
OXYGEN SATURATION: 98 % | SYSTOLIC BLOOD PRESSURE: 136 MMHG | WEIGHT: 214.31 LBS | HEART RATE: 65 BPM | RESPIRATION RATE: 20 BRPM | DIASTOLIC BLOOD PRESSURE: 70 MMHG | BODY MASS INDEX: 36.59 KG/M2 | HEIGHT: 64 IN

## 2025-05-29 DIAGNOSIS — G47.33 OSA ON CPAP: ICD-10-CM

## 2025-05-29 PROCEDURE — 3008F BODY MASS INDEX DOCD: CPT | Mod: CPTII,S$GLB,, | Performed by: NURSE PRACTITIONER

## 2025-05-29 PROCEDURE — 3078F DIAST BP <80 MM HG: CPT | Mod: CPTII,S$GLB,, | Performed by: NURSE PRACTITIONER

## 2025-05-29 PROCEDURE — 99999 PR PBB SHADOW E&M-EST. PATIENT-LVL V: CPT | Mod: PBBFAC,,, | Performed by: NURSE PRACTITIONER

## 2025-05-29 PROCEDURE — 3044F HG A1C LEVEL LT 7.0%: CPT | Mod: CPTII,S$GLB,, | Performed by: NURSE PRACTITIONER

## 2025-05-29 PROCEDURE — 1160F RVW MEDS BY RX/DR IN RCRD: CPT | Mod: CPTII,S$GLB,, | Performed by: NURSE PRACTITIONER

## 2025-05-29 PROCEDURE — 99204 OFFICE O/P NEW MOD 45 MIN: CPT | Mod: S$GLB,,, | Performed by: NURSE PRACTITIONER

## 2025-05-29 PROCEDURE — 1159F MED LIST DOCD IN RCRD: CPT | Mod: CPTII,S$GLB,, | Performed by: NURSE PRACTITIONER

## 2025-05-29 PROCEDURE — 3075F SYST BP GE 130 - 139MM HG: CPT | Mod: CPTII,S$GLB,, | Performed by: NURSE PRACTITIONER

## 2025-05-29 NOTE — TELEPHONE ENCOUNTER
----- Message from Jessica Banks sent at 5/29/2025  1:25 PM CDT -----  [facility]

## 2025-05-29 NOTE — PROGRESS NOTES
Patient ID: Jorge Jones is a 60 y.o. female.    Chief Complaint: Sleep Apnea      Subjective:      History of Present Illness    HPI:  Referred for LAURIE. Ms. Jones reports using her current CPAP machine for sleep apnea for approximately 10 years. She has been using the same mask for 2 years due to her previous supplier closing down. Her current CPAP machine is an older model ResMed S9. She wears the CPAP nightly for about 8 hours. This is her second machine.    Regarding her mask, she currently uses a nasal mask but expresses interest in switching to a full face mask. She found a full face mask more comfortable during a hospital stay for open heart surgery 2 years ago when her nose was congested.   She confirms snoring when not using her CPAP, particularly when reclining on her back. To manage her breathing without the CPAP, she attempts to sleep on her abdomen or side.       SOCIAL HISTORY:  Marital status:             STOP - BANG Questionnaire:  OFF CPAP   1. Snoring : Do you snore loudly ?    Yes    2. Tired : Do you often feel tired, fatigued, or sleepy during daytime?   Yes    3. Observed: Has anyone observed you stop breathing during your sleep?   Yes    4. Blood pressure : Do you have or are you being treated for high blood pressure?   Yes    5. BMI :BMI more than 35 kg/m2?   Yes    6. Age : Age over 50 yr old?   Yes    7. Neck circumference: Neck circumference greater than 40 cm?   Yes    8. Gender: Gender male?   No    High risk of LAURIE: Yes 5 - 8  Intermediate risk of LAURIE: Yes 3 - 4  Low risk of LAURIE: Yes 0 - 2      References:   STOP Questionnaire   A Tool to Screen Patients for Obstructive Sleep Apnea: BENNY Rivas.R.C.P.C., MIRANDA Delgado.B.B.S., Breanna Victoria M.D.,Mireya Melendrez, Ph.D., MIRANDA Lehman.B.B.S.,_ Esmer Shi.,_ Avis Wilburn M.D., Zeeshan Salas F.R.C.P.C.; Anesthesiology 2008; 108:812-21 Copyright © 2008, the American Society of Anesthesiologists, Inc.  "[employer]       [place] Questionnaire (validated LAURIE screening questionnaire)    Yes -- Snoring/apnea    Yes -- Fatigue    Body mass index is Body mass index is 36.78 kg/m²..  (>25 is overweight, >30 is obese)    Blood Pressure = Hypertension  (PreHTN 120-139/80-89, Stg1 140-159/90-99, Stg2 >160/>100)  [place] = three of three LAURIE categories are positive (high risk is 2-3 positive categories)         5/29/2025     8:30 AM   EPWORTH SLEEPINESS SCALE   Sitting and reading 2   Watching TV 1   Sitting, inactive in a public place (e.g. a theatre or a meeting) 2   As a passenger in a car for an hour without a break 1   Lying down to rest in the afternoon when circumstances permit 3   Sitting and talking to someone 0   Sitting quietly after a lunch without alcohol 2   In a car, while stopped for a few minutes in traffic 0   Total score 11      (validated sleepiness questionnaire with a higher score indicating greater sleepiness; range 0-24)    Problem List[1]      /70   Pulse 65   Resp 20   Ht 5' 4" (1.626 m)   Wt 97.2 kg (214 lb 4.6 oz)   SpO2 98%   BMI 36.78 kg/m²   Body mass index is 36.78 kg/m².    Review of Systems   Constitutional: Negative.    HENT: Negative.     Respiratory: Negative.     Cardiovascular: Negative.    Musculoskeletal: Negative.    Gastrointestinal: Negative.    Neurological: Negative.    Psychiatric/Behavioral: Negative.           Objective:      Physical Exam  Constitutional:       Appearance: Normal appearance. She is well-developed.   HENT:      Head: Normocephalic and atraumatic.      Nose: Nose normal.   Cardiovascular:      Rate and Rhythm: Normal rate and regular rhythm.   Pulmonary:      Effort: Pulmonary effort is normal.      Breath sounds: Normal breath sounds.   Abdominal:      Palpations: Abdomen is soft.      Tenderness: There is no abdominal tenderness.   Musculoskeletal:         General: Normal range of motion.      Cervical back: Normal range of " motion and neck supple.   Skin:     General: Skin is warm and dry.   Neurological:      Mental Status: She is alert and oriented to person, place, and time.   Psychiatric:         Mood and Affect: Mood normal.         Behavior: Behavior normal.       Personal Diagnostic Review  Review of PAP download. Special study media link attached to this encounter. Machine does not calculate AHI. 100% compliant over 30 days.       Assessment:     1. LAURIE on CPAP       Encounter Medications[2]  Orders Placed This Encounter   Procedures    Home Sleep Study     Standing Status:   Future     Expiration Date:   5/29/2026     Plan:     1. LAURIE on CPAP  -     Ambulatory referral/consult to Pulmonology  -     Home Sleep Study; Future         Assessment & Plan    IMPRESSION:  - Current CPAP machine is approximately 10 years old (S9 model).  - Considered auto-PAP machine, which automatically adjusts pressure as needed.  - Recommend full face mask instead of nasal mask due to reported difficulty when congested.  - Plan to order new auto-PAP machine with full face mask and regular (non-heated) hose, pending sleep study results. Unable to obtain previous study.    OBSTRUCTIVE SLEEP APNEA:  - Explained differences between older CPAP models and newer auto-PAP machines.  - Discussed benefits of full face mask for patients who experience nasal congestion.  - Ordered home sleep study to obtain updated diagnosis for insurance purposes and to reassess sleep apnea status.  - [facility] ([facility] will call patient next week to schedule home sleep study.  - Sleep study results expected in about 1-1.5 weeks after study is completed.  - Ms. Jones can check for summary of results on their chart once available.    FOLLOW-UP:  - Follow up after sleep study results are available.            This note was generated with the assistance of ambient listening technology. Verbal consent was obtained by the patient and accompanying visitor(s) for the recording of  patient appointment to facilitate this note. I attest to having reviewed and edited the generated note for accuracy, though some syntax or spelling errors may persist. Please contact the author of this note for any clarification.          [1]   Patient Active Problem List  Diagnosis    Adrenal adenoma    Allergic rhinitis    Anxiety disorder    Arteriosclerosis of coronary artery    Depressive disorder    Fibromyalgia    High cholesterol    Heart disease    BMI 38.0-38.9,adult    Shortness of breath    LAURIE on CPAP    Diabetes mellitus with coincident hypertension    Uses continuous positive airway pressure (CPAP) ventilation at home    Vitamin D deficiency disease    Severe obesity (BMI 35.0-39.9) with comorbidity    Cystocele with rectocele   [2]   Outpatient Encounter Medications as of 5/29/2025   Medication Sig Dispense Refill    amLODIPine (NORVASC) 5 MG tablet Take 1 tablet (5 mg total) by mouth every evening. 90 tablet 3    aspirin (ECOTRIN) 81 MG EC tablet Take 81 mg by mouth.      blood-glucose sensor (DEXCOM G7 SENSOR) Isha Change sensor every 10 days 9 each 1    DULoxetine (CYMBALTA) 30 MG capsule Take 1 capsule (30 mg total) by mouth once daily. 90 capsule 3    insulin glargine, TOUJEO, (TOUJEO SOLOSTAR U-300 INSULIN) 300 unit/mL (1.5 mL) InPn pen Inject 30 Units into the skin once daily. 9 mL 3    metFORMIN (GLUCOPHAGE-XR) 500 MG ER 24hr tablet TAKE 2 TABLETS TWICE A DAY WITH MEALS 360 tablet 0    metoprolol succinate (TOPROL-XL) 100 MG 24 hr tablet Take 1 tablet (100 mg total) by mouth every evening. 90 tablet 3    NOVOLOG FLEXPEN U-100 INSULIN 100 unit/mL (3 mL) InPn pen Inject 10 Units into the skin 3 (three) times daily with meals. Plus sliding scale if needed 15 mL 3    olmesartan-hydrochlorothiazide (BENICAR HCT) 40-25 mg per tablet Take 1 tablet by mouth once daily. 90 tablet 3    omega-3 fatty acids/fish oil (FISH OIL-OMEGA-3 FATTY ACIDS) 300-1,000 mg capsule Take 2 g by mouth.      pantoprazole  (PROTONIX) 40 MG tablet Take 1 tablet (40 mg total) by mouth once daily. 90 tablet 1    pravastatin (PRAVACHOL) 20 MG tablet Take 1 tablet (20 mg total) by mouth once daily. 90 tablet 3    vitamin D (VITAMIN D3) 1000 units Tab Take 1,000 Units by mouth once daily.      [DISCONTINUED] mupirocin (BACTROBAN) 2 % ointment Apply topically 3 (three) times daily. 30 g 1     No facility-administered encounter medications on file as of 5/29/2025.

## 2025-06-05 ENCOUNTER — TELEPHONE (OUTPATIENT)
Dept: PHARMACY | Facility: CLINIC | Age: 60
End: 2025-06-05
Payer: COMMERCIAL

## 2025-06-26 ENCOUNTER — TELEPHONE (OUTPATIENT)
Dept: DIABETES | Facility: CLINIC | Age: 60
End: 2025-06-26
Payer: COMMERCIAL

## 2025-06-26 NOTE — TELEPHONE ENCOUNTER
----- Message from Lanny Padron NP sent at 6/26/2025  2:54 PM CDT -----  Please let Mrs. Jones know she and her  can schedule follow up appointments with me in [place] in July or if they would prefer to wait until the [place] schedule opens (should be early August), we can schedule them in [place].     Thank you!     Lanny Padron, DEBRA   [facility]

## 2025-08-07 DIAGNOSIS — Z79.4 UNCONTROLLED TYPE 2 DIABETES MELLITUS WITH HYPERGLYCEMIA, WITH LONG-TERM CURRENT USE OF INSULIN: ICD-10-CM

## 2025-08-07 DIAGNOSIS — E11.65 UNCONTROLLED TYPE 2 DIABETES MELLITUS WITH HYPERGLYCEMIA, WITH LONG-TERM CURRENT USE OF INSULIN: ICD-10-CM

## 2025-08-07 RX ORDER — BLOOD-GLUCOSE SENSOR
EACH MISCELLANEOUS
Qty: 9 EACH | Refills: 0 | Status: SHIPPED | OUTPATIENT
Start: 2025-08-07

## 2025-08-08 ENCOUNTER — TELEPHONE (OUTPATIENT)
Dept: DIABETES | Facility: CLINIC | Age: 60
End: 2025-08-08
Payer: COMMERCIAL

## 2025-08-08 DIAGNOSIS — E11.69 TYPE 2 DIABETES MELLITUS WITH OTHER SPECIFIED COMPLICATION, WITH LONG-TERM CURRENT USE OF INSULIN: ICD-10-CM

## 2025-08-08 DIAGNOSIS — Z79.4 TYPE 2 DIABETES MELLITUS WITH OTHER SPECIFIED COMPLICATION, WITH LONG-TERM CURRENT USE OF INSULIN: ICD-10-CM

## 2025-08-08 RX ORDER — METFORMIN HYDROCHLORIDE 500 MG/1
1000 TABLET, EXTENDED RELEASE ORAL 2 TIMES DAILY WITH MEALS
Qty: 360 TABLET | Refills: 0 | Status: SHIPPED | OUTPATIENT
Start: 2025-08-08

## 2025-08-08 NOTE — TELEPHONE ENCOUNTER
Spoke with patient, patient wanted to switch care over to Lanny in [facility]. Scheduled patient for September 8 at 10:00 am    Patient also requested for her  to be switched as well. Patient's  has been scheduled September 8 at 11:00 am.

## 2025-08-08 NOTE — TELEPHONE ENCOUNTER
Copied from CRM #[id number]. Topic: General Inquiry - Return Call  >> Aug 8, 2025 12:38 PM Yolande wrote:  .Type:  Patient Returning Call    Who Called:.Jorge Jones  Who Left Message for Patient:  Does the patient know what this is regarding?:  Would the patient rather a call back or a response via [facility]? Call back  Best Call Back Number:.[phone]  Additional Information: pt received a message about getting schedule and will like a call back

## 2025-08-09 NOTE — TELEPHONE ENCOUNTER
Refill Decision Note   Jorge Robert  is requesting a refill authorization.  Brief Assessment and Rationale for Refill:  Approve     Medication Therapy Plan:        Comments:     Note composed:7:25 PM 08/08/2025

## 2025-08-10 ENCOUNTER — TELEPHONE (OUTPATIENT)
Dept: PHARMACY | Facility: CLINIC | Age: 60
End: 2025-08-10
Payer: COMMERCIAL

## 2025-08-19 ENCOUNTER — TELEPHONE (OUTPATIENT)
Dept: DIABETES | Facility: CLINIC | Age: 60
End: 2025-08-19
Payer: COMMERCIAL